# Patient Record
Sex: FEMALE | Race: BLACK OR AFRICAN AMERICAN | NOT HISPANIC OR LATINO | URBAN - METROPOLITAN AREA
[De-identification: names, ages, dates, MRNs, and addresses within clinical notes are randomized per-mention and may not be internally consistent; named-entity substitution may affect disease eponyms.]

---

## 2017-01-25 ENCOUNTER — INPATIENT (INPATIENT)
Facility: HOSPITAL | Age: 62
LOS: 8 days | Discharge: HOME HEALTH | DRG: 194 | End: 2017-02-03
Attending: INTERNAL MEDICINE | Admitting: INTERNAL MEDICINE
Payer: COMMERCIAL

## 2017-01-25 VITALS
RESPIRATION RATE: 18 BRPM | SYSTOLIC BLOOD PRESSURE: 91 MMHG | OXYGEN SATURATION: 92 % | HEART RATE: 115 BPM | TEMPERATURE: 99 F | DIASTOLIC BLOOD PRESSURE: 60 MMHG

## 2017-01-25 DIAGNOSIS — Z86.718 PERSONAL HISTORY OF OTHER VENOUS THROMBOSIS AND EMBOLISM: ICD-10-CM

## 2017-01-25 DIAGNOSIS — R63.8 OTHER SYMPTOMS AND SIGNS CONCERNING FOOD AND FLUID INTAKE: ICD-10-CM

## 2017-01-25 DIAGNOSIS — J45.909 UNSPECIFIED ASTHMA, UNCOMPLICATED: ICD-10-CM

## 2017-01-25 DIAGNOSIS — K56.69 OTHER INTESTINAL OBSTRUCTION: Chronic | ICD-10-CM

## 2017-01-25 DIAGNOSIS — G35 MULTIPLE SCLEROSIS: ICD-10-CM

## 2017-01-25 DIAGNOSIS — K56.69 OTHER INTESTINAL OBSTRUCTION: ICD-10-CM

## 2017-01-25 DIAGNOSIS — D64.9 ANEMIA, UNSPECIFIED: ICD-10-CM

## 2017-01-25 DIAGNOSIS — Z29.9 ENCOUNTER FOR PROPHYLACTIC MEASURES, UNSPECIFIED: ICD-10-CM

## 2017-01-25 DIAGNOSIS — I63.9 CEREBRAL INFARCTION, UNSPECIFIED: ICD-10-CM

## 2017-01-25 DIAGNOSIS — R06.02 SHORTNESS OF BREATH: ICD-10-CM

## 2017-01-25 LAB
ALBUMIN SERPL ELPH-MCNC: 3.3 G/DL — LOW (ref 3.4–5)
ALP SERPL-CCNC: 72 U/L — SIGNIFICANT CHANGE UP (ref 40–120)
ALT FLD-CCNC: 91 U/L — HIGH (ref 12–42)
ANION GAP SERPL CALC-SCNC: 7 MMOL/L — LOW (ref 9–16)
APTT BLD: 32.6 SEC — SIGNIFICANT CHANGE UP (ref 27.5–37.4)
AST SERPL-CCNC: 223 U/L — HIGH (ref 15–37)
BILIRUB SERPL-MCNC: 0.6 MG/DL — SIGNIFICANT CHANGE UP (ref 0.2–1.2)
BUN SERPL-MCNC: 16 MG/DL — SIGNIFICANT CHANGE UP (ref 7–23)
CALCIUM SERPL-MCNC: 7.9 MG/DL — LOW (ref 8.5–10.5)
CHLORIDE SERPL-SCNC: 101 MMOL/L — SIGNIFICANT CHANGE UP (ref 96–108)
CO2 SERPL-SCNC: 28 MMOL/L — SIGNIFICANT CHANGE UP (ref 22–31)
CREAT SERPL-MCNC: 0.76 MG/DL — SIGNIFICANT CHANGE UP (ref 0.5–1.3)
GLUCOSE SERPL-MCNC: 84 MG/DL — SIGNIFICANT CHANGE UP (ref 70–99)
HCT VFR BLD CALC: 33.5 % — LOW (ref 34.5–45)
HGB BLD-MCNC: 10.4 G/DL — LOW (ref 11.5–15.5)
INR BLD: 1.05 — SIGNIFICANT CHANGE UP (ref 0.88–1.16)
LACTATE SERPL-SCNC: 1.4 MMOL/L — SIGNIFICANT CHANGE UP (ref 0.5–2)
MAGNESIUM SERPL-MCNC: 2.7 MG/DL — HIGH (ref 1.6–2.4)
MCHC RBC-ENTMCNC: 26.7 PG — LOW (ref 27–34)
MCHC RBC-ENTMCNC: 31 G/DL — LOW (ref 32–36)
MCV RBC AUTO: 85.9 FL — SIGNIFICANT CHANGE UP (ref 80–100)
NT-PROBNP SERPL-SCNC: 45 PG/ML — SIGNIFICANT CHANGE UP
PHOSPHATE SERPL-MCNC: 3.8 MG/DL — SIGNIFICANT CHANGE UP (ref 2.5–4.5)
PLATELET # BLD AUTO: 236 K/UL — SIGNIFICANT CHANGE UP (ref 150–400)
POTASSIUM SERPL-MCNC: 4.4 MMOL/L — SIGNIFICANT CHANGE UP (ref 3.5–5.3)
POTASSIUM SERPL-SCNC: 4.4 MMOL/L — SIGNIFICANT CHANGE UP (ref 3.5–5.3)
PROT SERPL-MCNC: 7.1 G/DL — SIGNIFICANT CHANGE UP (ref 6.4–8.2)
PROTHROM AB SERPL-ACNC: 11.7 SEC — SIGNIFICANT CHANGE UP (ref 10–13.1)
RBC # BLD: 3.9 M/UL — SIGNIFICANT CHANGE UP (ref 3.8–5.2)
RBC # FLD: 18.3 % — HIGH (ref 10.3–16.9)
SODIUM SERPL-SCNC: 136 MMOL/L — SIGNIFICANT CHANGE UP (ref 135–145)
TROPONIN I SERPL-MCNC: <0.015 NG/ML — SIGNIFICANT CHANGE UP (ref 0.01–0.04)
WBC # BLD: 2.2 K/UL — LOW (ref 3.8–10.5)
WBC # FLD AUTO: 2.2 K/UL — LOW (ref 3.8–10.5)

## 2017-01-25 PROCEDURE — 71010: CPT | Mod: 26

## 2017-01-25 PROCEDURE — 93010 ELECTROCARDIOGRAM REPORT: CPT

## 2017-01-25 PROCEDURE — 77002 NEEDLE LOCALIZATION BY XRAY: CPT | Mod: 26

## 2017-01-25 PROCEDURE — 71275 CT ANGIOGRAPHY CHEST: CPT | Mod: 26

## 2017-01-25 PROCEDURE — 99285 EMERGENCY DEPT VISIT HI MDM: CPT | Mod: 25

## 2017-01-25 PROCEDURE — 38221 DX BONE MARROW BIOPSIES: CPT

## 2017-01-25 RX ORDER — MORPHINE SULFATE 50 MG/1
2 CAPSULE, EXTENDED RELEASE ORAL ONCE
Qty: 0 | Refills: 0 | Status: DISCONTINUED | OUTPATIENT
Start: 2017-01-25 | End: 2017-01-25

## 2017-01-25 RX ORDER — IPRATROPIUM/ALBUTEROL SULFATE 18-103MCG
3 AEROSOL WITH ADAPTER (GRAM) INHALATION EVERY 4 HOURS
Qty: 0 | Refills: 0 | Status: DISCONTINUED | OUTPATIENT
Start: 2017-01-25 | End: 2017-02-03

## 2017-01-25 RX ORDER — PIPERACILLIN AND TAZOBACTAM 4; .5 G/20ML; G/20ML
INJECTION, POWDER, LYOPHILIZED, FOR SOLUTION INTRAVENOUS
Qty: 0 | Refills: 0 | Status: DISCONTINUED | OUTPATIENT
Start: 2017-01-26 | End: 2017-02-01

## 2017-01-25 RX ORDER — PIPERACILLIN AND TAZOBACTAM 4; .5 G/20ML; G/20ML
4.5 INJECTION, POWDER, LYOPHILIZED, FOR SOLUTION INTRAVENOUS ONCE
Qty: 0 | Refills: 0 | Status: COMPLETED | OUTPATIENT
Start: 2017-01-25 | End: 2017-01-26

## 2017-01-25 RX ORDER — PIPERACILLIN AND TAZOBACTAM 4; .5 G/20ML; G/20ML
4.5 INJECTION, POWDER, LYOPHILIZED, FOR SOLUTION INTRAVENOUS EVERY 6 HOURS
Qty: 0 | Refills: 0 | Status: DISCONTINUED | OUTPATIENT
Start: 2017-01-26 | End: 2017-02-01

## 2017-01-25 RX ORDER — SODIUM CHLORIDE 9 MG/ML
1000 INJECTION INTRAMUSCULAR; INTRAVENOUS; SUBCUTANEOUS ONCE
Qty: 0 | Refills: 0 | Status: COMPLETED | OUTPATIENT
Start: 2017-01-25 | End: 2017-01-25

## 2017-01-25 RX ORDER — INSULIN LISPRO 100/ML
VIAL (ML) SUBCUTANEOUS
Qty: 0 | Refills: 0 | Status: DISCONTINUED | OUTPATIENT
Start: 2017-01-25 | End: 2017-02-03

## 2017-01-25 RX ORDER — IPRATROPIUM/ALBUTEROL SULFATE 18-103MCG
3 AEROSOL WITH ADAPTER (GRAM) INHALATION ONCE
Qty: 0 | Refills: 0 | Status: COMPLETED | OUTPATIENT
Start: 2017-01-25 | End: 2017-01-25

## 2017-01-25 RX ORDER — SODIUM CHLORIDE 9 MG/ML
1000 INJECTION INTRAMUSCULAR; INTRAVENOUS; SUBCUTANEOUS
Qty: 0 | Refills: 0 | Status: DISCONTINUED | OUTPATIENT
Start: 2017-01-25 | End: 2017-01-26

## 2017-01-25 RX ORDER — HYDROMORPHONE HYDROCHLORIDE 2 MG/ML
1 INJECTION INTRAMUSCULAR; INTRAVENOUS; SUBCUTANEOUS ONCE
Qty: 0 | Refills: 0 | Status: DISCONTINUED | OUTPATIENT
Start: 2017-01-25 | End: 2017-01-25

## 2017-01-25 RX ORDER — VANCOMYCIN HCL 1 G
1000 VIAL (EA) INTRAVENOUS EVERY 12 HOURS
Qty: 0 | Refills: 0 | Status: DISCONTINUED | OUTPATIENT
Start: 2017-01-25 | End: 2017-01-27

## 2017-01-25 RX ORDER — FENTANYL CITRATE 50 UG/ML
50 INJECTION INTRAVENOUS ONCE
Qty: 0 | Refills: 0 | Status: DISCONTINUED | OUTPATIENT
Start: 2017-01-25 | End: 2017-01-25

## 2017-01-25 RX ADMIN — Medication 52 MILLIGRAM(S): at 23:02

## 2017-01-25 RX ADMIN — SODIUM CHLORIDE 1000 MILLILITER(S): 9 INJECTION INTRAMUSCULAR; INTRAVENOUS; SUBCUTANEOUS at 19:39

## 2017-01-25 RX ADMIN — SODIUM CHLORIDE 2000 MILLILITER(S): 9 INJECTION INTRAMUSCULAR; INTRAVENOUS; SUBCUTANEOUS at 23:02

## 2017-01-25 RX ADMIN — Medication 3 MILLILITER(S): at 23:02

## 2017-01-25 RX ADMIN — MORPHINE SULFATE 2 MILLIGRAM(S): 50 CAPSULE, EXTENDED RELEASE ORAL at 23:06

## 2017-01-25 NOTE — ED PROVIDER NOTE - MUSCULOSKELETAL, MLM
Spine appears normal, range of motion is not limited, no muscle or joint tenderness. no calf ttp/swelling.

## 2017-01-25 NOTE — ED PROVIDER NOTE - OBJECTIVE STATEMENT
61y female h/o anemia, asthma, DVT/PE (noncompliant with xarelto x1m), SBOs, MS, CVA sent from Dr Garay's office for evaluation. Pt c/o stiffness to her b/l LEs for 4-5 days, trouble walking on her own, and generalized weakness. Also notes intermittent chest pain a/w shortness of breath for several days. (+)dry cough. No fever, V/D, abd pain. Noncompliant with multiple meds including xarelto.

## 2017-01-25 NOTE — H&P ADULT. - GASTROINTESTINAL DETAILS
no rebound tenderness/bowel sounds normal/mildly diffusely tender, midline scar well healed/no rigidity/no guarding/soft

## 2017-01-25 NOTE — ED PROVIDER NOTE - CARE PLAN
Principal Discharge DX:	Shortness of breath  Secondary Diagnosis:	Uncomplicated asthma, unspecified asthma severity

## 2017-01-25 NOTE — H&P ADULT. - PMH
Anemia    Asthma    CVA (cerebral vascular accident)    History of DVT (deep vein thrombosis)    MS (multiple sclerosis)    Small bowel obstruction  multiple, recurrent

## 2017-01-25 NOTE — H&P ADULT. - ASSESSMENT
Pt is a 61 year old female with PMH of anemia, asthma, DVT with noncompliance with AC, MS, multiple SBO's s/p multiple abdominal surgeries and recent CVA admitted to [facility] about 4 weeks ago for 4-6weeks who presented to the ED today after being sent in from Dr. Garay's office for tachycardia, low blood pressure and complaint of cough and sob.

## 2017-01-25 NOTE — H&P ADULT. - PROBLEM SELECTOR PLAN 4
pt currently not wheezing on exam and hadn't received steroids or nebulizers prior.  -however ED ordered patient for solumedrol 125mg, pt does not appear to be in asthma exacerbation, will start duonebs [id number], but no need to continue steroids at this time.  -pulm to evaluate patient in the AM.

## 2017-01-25 NOTE — ED PROVIDER NOTE - CONSTITUTIONAL, MLM
normal... cachectic appearing, awake, alert, oriented to person, place, time/situation and in no apparent distress.

## 2017-01-25 NOTE — H&P ADULT. - PROBLEM SELECTOR PLAN 1
associated with cough, found to have tachycardic and low BP in ED, likely 2/2 PNA vs influenza, less likely PE.  -pt with CT angio showing no PE but mucous plugging, left sided infiltrate with possible parapneumonic effusion present. In setting of low BP, tachycardia and low grade temp of 99, will treat patient with vancomycin and zosyn for likely HCAP.  -check RVP to r/o influenza.  -holding off on steroids at this time as no wheezing on exam, however will start duonebs [id number].   -pt without work of breathing currently, will continue 2L NC for supplemental O2 for now.  -pulmonary to see patient in AM.

## 2017-01-25 NOTE — H&P ADULT. - PROBLEM SELECTOR PLAN 3
per patient she had a brain bleed 4 weeks ago and was admitted at [facility] for 4-6 weeks. will need to obtain collateral information in AM.  -will check CT head for possible bleed. no HSQ or AC until confirmed no brain bleed present.  -lower extremity weakness and numbness is stable since admission for stroke per patient.  -neurology to see patient in the AM. per patient she had a brain bleed 4 weeks ago and was admitted at [facility] for 4-6 weeks. will need to obtain collateral information in AM.  -will check CT head for possible bleed. no HSQ or AC until confirmed no brain bleed present.  -lower extremity weakness and numbness is stable since admission for stroke per patient.  -neurology to see patient in the AM.  -continue home keppra 1000mg bid for seizure prophylaxis.

## 2017-01-25 NOTE — H&P ADULT. - MOTOR
5/5 upper extremities b/l, lower extremities right LE with 4/5 hip flexion, dorsiflexion, plantarflexion, 5/5 upper extremities b/l, lower extremities right LE with 4/5 hip flexion, dorsiflexion, plantarflexion, left lower extremity with 3-4/5 hip flexors, 4/5 plantar flexion and 3/5 dorsiflexion.

## 2017-01-25 NOTE — H&P ADULT. - PROBLEM SELECTOR PLAN 6
h/o multiple SBOs in the past and now with mild diffuse abdominal pain, no rigidity, no rebound.  -pt suffers from chronic abdominal pain and on methadone per Dr. Garay however unable to confirm dosage as no med list and [facility] is closed and unable to contact sister. will clarify in AM  - no BM for one week, will check abdominal Xray and keep NPO for now.  -if no SBO will give patient aggressive bowel regimen h/o multiple SBOs in the past and now with mild diffuse abdominal pain, no rigidity, no rebound.  -pt suffers from chronic abdominal pain and on methadone per Dr. Garay however unable to confirm dosage as no med list and [facility] is closed and unable to contact sister. will clarify in AM  - no BM for one week, will check abdominal Xray and keep NPO for now.  -will continue home methadone 10mg [id number] if no obstruction noted.  -continue home protonix h/o multiple SBOs in the past and now with mild diffuse abdominal pain, no rigidity, no rebound.  -pt suffers from chronic abdominal pain and on methadone per Dr. Garay  - no BM for one week, will check abdominal Xray and keep NPO for now.  -will continue home methadone 10mg [id number] if no obstruction noted.  -continue home protonix

## 2017-01-25 NOTE — ED CLERICAL - NS ED CLERK NOTE PRE-ARRIVAL INFORMATION; ADDITIONAL PRE-ARRIVAL INFORMATION
63/F/ [facility] PT CALLED 1 MONTH AGO ASKING FOR SYRINGES + AMBIAN+ SEVERAL OTHERS MED NEVER FOLLOWED UO AFTER RECENT HOSPITALIZATION SOB AFTER NON COMPLIANCE & DVT TREATMENT SENT BY DR. SHANNON

## 2017-01-25 NOTE — H&P ADULT. - PROBLEM SELECTOR PLAN 7
normocytic anemia, possibly AOCD or iron deficiency, unclear baseline, however does not appear to be acutely bleeding, no GI symptoms, less likely hemolyzing due to normal T. bili.  - will check iron panel, monitor cbc

## 2017-01-25 NOTE — ED ADULT NURSE REASSESSMENT NOTE - NS ED NURSE REASSESS COMMENT FT1
Pt noted to be hypotensive, requesting pain medication for abdominal pain. Dr Perez aware, IV fluid given per order. When return to pt to attempt administration of pain medication, pt sleeping comfortably. Per Dr Perez, hold pain medication at this time.

## 2017-01-25 NOTE — H&P ADULT. - PROBLEM SELECTOR PLAN 5
stable, pt does not appear to be in MS flare. will clarify medications in AM, if patient takes any.  -per Dr. Garay neuro Dr. mendel to see the patient in the AM.

## 2017-01-25 NOTE — ED ADULT NURSE NOTE - OBJECTIVE STATEMENT
Pt presents to ED A&OX3 c/o SOB, right hip pain and b/l leg weakness and numbness x 2 weeks. Pt reports multiple falls over the past two days, states she hit her head a few times. Pt with hx of MS and stroke "a few months ago". Pt denies CP, fevers, n/v/d. Pt upgraded to Dr Perez. Pt placed on telemetry monitor, O2 2L NC applied to maintain O2 sat >92%. Pt speaking in complete sentences, lungs clear.

## 2017-01-25 NOTE — ED PROVIDER NOTE - PROGRESS NOTE DETAILS
CTA noted; no PE, but (+)LLL mucus plugging and consolidation with small effusion, (+)bronchial inflammation.

## 2017-01-25 NOTE — H&P ADULT. - HISTORY OF PRESENT ILLNESS
Pt is a 61 year old female with PMH of anemia, asthma, DVT with noncompliance with AC, MS, multiple SBO's s/p multiple abdominal surgeries and recent CVA admitted to [facility] about 4 weeks ago for 4-6weeks who presented to the ED today after being sent in from Dr. Garay's office for tachycardia, low blood pressure and complaint of cough and sob.  Pt stated she has been having a cough with yellow phlegm since Last friday along with f/c/s and congestion and myalgias.  Pt stated her sister also was sick last week with similar symptoms. She denied wheezing at home or increased use of her inhalers.  Pt also stated she was admitted at [facility] about 4 weeks ago for a stroke that she had and was told she had a brain bleed but unclear if actual brain bleed as patient stated she was also taking lovenox at home.  Since her stroke about 4 weeks ago the patient has admitted to lower extremity weakness and numbness in her toes up to mid shin with difficulty walking and getting out of bed.  She also stated she has had multiple SBO's about 6 of them and that she hasn't had a BM in about one week and has had a diffuse cramping abdominal pain for the last few days with an episode of nausea last week, but no vomiting.  She admitted to decreased PO intake at home.  In the ED patient had a CT angio to r/o PE and vital signs were Tmax: 99.0, HR: , BP 98/61, RR 18 and sO2 of 92% room air and 98% on 2L NC. Pt is a 61 year old female with PMH of anemia, asthma, DVT with noncompliance with AC, MS, multiple SBO's s/p multiple abdominal surgeries and recent CVA admitted to [facility] about 4 weeks ago for 4-6weeks who presented to the ED today after being sent in from Dr. Garay's office for tachycardia, low blood pressure and complaint of cough and sob.  Pt stated she has been having a cough with yellow phlegm since Last friday along with f/c/s and congestion and myalgias.  Pt stated her sister also was sick last week with similar symptoms. She denied wheezing at home or increased use of her inhalers.  Pt also stated she was admitted at [facility] about 4 weeks ago for a stroke that she had and was told she had a brain bleed but unclear if actual brain bleed as patient stated she was also taking lovenox at home.  Since her stroke about 4 weeks ago the patient has admitted to having continued lower extremity weakness and numbness in her toes up to mid shin with difficulty walking and getting out of bed.  Pt denied back pain.  She also stated she has had multiple SBO's about 6 of them and that she hasn't had a BM in about one week and has had a diffuse cramping abdominal pain for the last few days with an episode of nausea last week, but no vomiting.  She admitted to decreased PO intake at home.  In the ED patient had a CT angio to r/o PE and vital signs were Tmax: 99.0, HR: , BP 98/61, RR 18 and sO2 of 92% room air and 98% on 2L NC.

## 2017-01-25 NOTE — PROGRESS NOTE ADULT - SUBJECTIVE AND OBJECTIVE BOX
INTERVAL HISTORY:61yFemalePatient is a 61y old  Female who presents with a chief complaint of   	  MEDICATIONS:        fentaNYL    Injectable 50MICROGram(s) IV Push Once            Complaint:     Otherwise 12 point review of systems is normal.    PHYSICAL EXAM:    Constitutional: good  Eyes: PERRL, EOMI, sclera non-icteric  Neck: supple, no masses, no JVD  Respiratory: CTA b/l, good air entry b/l, no wheezing, rhonchi, rales, with normal respiratory effort and no intercostal retractions  Cardiovascular: RRR, normal S1S2, no M/R/G  Gastrointestinal: soft, NTND, no masses palpable, BS normal in all four quadrants,   Extremities:  no c/c/e  Neurological: AAOx3  Temp:Afebrile  ICU Vital Signs Last 24 Hrs  T(C): 37.2, Max: 37.2 (01-25 @ 14:58)  T(F): 99, Max: 99 (01-25 @ 14:58)  HR: 92 (92 - 115)  BP: 98/61 (91/60 - 98/61)  BP(mean): --  ABP: --  ABP(mean): --  RR: 17 (17 - 18)  SpO2: 99% (92% - 99%)        ECG:      CHEST X RAY    CT    MRI    MRA    CT ANGIO    CAROTID DUPLEX    DUPLEX     Echocardiogram    Catheterization:    Stress Test:     LABS:	 	  CARDIAC MARKERS:  Troponin I, Serum: <0.015 ng/mL [0.015 - 0.045] (01-25 @ 16:23)                              10.4   2.2   )-----------( 236      ( 25 Jan 2017 16:23 )             33.5     25 Jan 2017 16:23    136    |  101    |  16     ----------------------------<  84     4.4     |  28     |  0.76     Ca    7.9        25 Jan 2017 16:23    TPro  7.1    /  Alb  3.3    /  TBili  0.6    /  DBili  x      /  AST  223    /  ALT  91     /  AlkPhos  72     25 Jan 2017 16:23    proBNP: Serum Pro-Brain Natriuretic Peptide: 45 pg/mL (01-25 @ 16:23)    Lipid Profile:   HgA1c:   TSH:           ASSESSMENT/PLAN:

## 2017-01-25 NOTE — H&P ADULT. - PROBLEM SELECTOR PLAN 9
NPO for now pending abdominal Xray, pt unable to give a list of her medications currently as she cant remember them so I attempted to call her sister Ole @ [phone] however no answer, pt will need med rec in AM. NPO for now pending abdominal Xray,  pt will need med rec in AM.

## 2017-01-25 NOTE — ED ADULT NURSE NOTE - PMH
Anemia    Asthma    G tube feedings    History of DVT (deep vein thrombosis)    MS (multiple sclerosis)    Small bowel obstruction  multiple, recurrent

## 2017-01-25 NOTE — H&P ADULT. - PROBLEM SELECTOR PLAN 2
per patient h/o DVT in her left leg and previously on xarelto and lovenox.  -per Dr. Garay concern that patient may have been taking both xarelto and lovenox at home, will hold AC for now and check CT head prior to restarting AC as patient stated she had a brain bleed 4 weeks ago. per patient h/o DVT in her left leg and previously on xarelto and lovenox.  -per Dr. Garay concern that patient may have been taking both xarelto and lovenox at home, will hold AC for now and check CT head prior to restarting AC as patient stated she had a brain bleed 4 weeks ago.  -coags currently normal, will monitor patient for signs of bleeding.

## 2017-01-26 LAB
ALBUMIN SERPL ELPH-MCNC: 2.6 G/DL — LOW (ref 3.4–5)
ALP SERPL-CCNC: 56 U/L — SIGNIFICANT CHANGE UP (ref 40–120)
ALT FLD-CCNC: 66 U/L — HIGH (ref 12–42)
ANION GAP SERPL CALC-SCNC: 7 MMOL/L — LOW (ref 9–16)
AST SERPL-CCNC: 115 U/L — HIGH (ref 15–37)
BILIRUB SERPL-MCNC: 0.2 MG/DL — SIGNIFICANT CHANGE UP (ref 0.2–1.2)
BUN SERPL-MCNC: 10 MG/DL — SIGNIFICANT CHANGE UP (ref 7–23)
CALCIUM SERPL-MCNC: 7.4 MG/DL — LOW (ref 8.5–10.5)
CHLORIDE SERPL-SCNC: 111 MMOL/L — HIGH (ref 96–108)
CO2 SERPL-SCNC: 25 MMOL/L — SIGNIFICANT CHANGE UP (ref 22–31)
CREAT SERPL-MCNC: 0.56 MG/DL — SIGNIFICANT CHANGE UP (ref 0.5–1.3)
FERRITIN SERPL-MCNC: 39.4 NG/ML — SIGNIFICANT CHANGE UP (ref 8–252)
FLUAV H1 2009 PAND RNA SPEC QL NAA+PROBE: DETECTED
GLUCOSE SERPL-MCNC: 38 MG/DL — CRITICAL LOW (ref 70–99)
HBA1C BLD-MCNC: 4.9 % — SIGNIFICANT CHANGE UP (ref 4.8–5.6)
HCT VFR BLD CALC: 28.9 % — LOW (ref 34.5–45)
HCV AB S/CO SERPL IA: 0.1 S/CO — SIGNIFICANT CHANGE UP
HCV AB SERPL-IMP: SIGNIFICANT CHANGE UP
HGB BLD-MCNC: 8.7 G/DL — LOW (ref 11.5–15.5)
IRON SATN MFR SERPL: 16 UG/DL — LOW (ref 50–170)
IRON SATN MFR SERPL: 6 % — LOW (ref 20–38)
MAGNESIUM SERPL-MCNC: 2.6 MG/DL — HIGH (ref 1.6–2.4)
MCHC RBC-ENTMCNC: 26.7 PG — LOW (ref 27–34)
MCHC RBC-ENTMCNC: 30.1 G/DL — LOW (ref 32–36)
MCV RBC AUTO: 88.7 FL — SIGNIFICANT CHANGE UP (ref 80–100)
PLATELET # BLD AUTO: 205 K/UL — SIGNIFICANT CHANGE UP (ref 150–400)
POTASSIUM SERPL-MCNC: 4.5 MMOL/L — SIGNIFICANT CHANGE UP (ref 3.5–5.3)
POTASSIUM SERPL-SCNC: 4.5 MMOL/L — SIGNIFICANT CHANGE UP (ref 3.5–5.3)
PROT SERPL-MCNC: 5.8 G/DL — LOW (ref 6.4–8.2)
RAPID RVP RESULT: DETECTED
RBC # BLD: 3.26 M/UL — LOW (ref 3.8–5.2)
RBC # FLD: 18.7 % — HIGH (ref 10.3–16.9)
SODIUM SERPL-SCNC: 143 MMOL/L — SIGNIFICANT CHANGE UP (ref 135–145)
TIBC SERPL-MCNC: 260 UG/DL — SIGNIFICANT CHANGE UP (ref 250–450)
TRANSFERRIN SERPL-MCNC: 185 MG/DL — LOW (ref 200–360)
WBC # BLD: 2.3 K/UL — LOW (ref 3.8–10.5)
WBC # FLD AUTO: 2.3 K/UL — LOW (ref 3.8–10.5)

## 2017-01-26 PROCEDURE — 93970 EXTREMITY STUDY: CPT | Mod: 26

## 2017-01-26 PROCEDURE — 76705 ECHO EXAM OF ABDOMEN: CPT | Mod: 26

## 2017-01-26 PROCEDURE — 99223 1ST HOSP IP/OBS HIGH 75: CPT

## 2017-01-26 PROCEDURE — 70450 CT HEAD/BRAIN W/O DYE: CPT | Mod: 26

## 2017-01-26 PROCEDURE — 74020: CPT | Mod: 26

## 2017-01-26 RX ORDER — ZOLPIDEM TARTRATE 10 MG/1
5 TABLET ORAL ONCE
Qty: 0 | Refills: 0 | Status: DISCONTINUED | OUTPATIENT
Start: 2017-01-26 | End: 2017-01-26

## 2017-01-26 RX ORDER — MORPHINE SULFATE 50 MG/1
2 CAPSULE, EXTENDED RELEASE ORAL ONCE
Qty: 0 | Refills: 0 | Status: DISCONTINUED | OUTPATIENT
Start: 2017-01-26 | End: 2017-01-26

## 2017-01-26 RX ORDER — METHADONE HYDROCHLORIDE 40 MG/1
10 TABLET ORAL EVERY 6 HOURS
Qty: 0 | Refills: 0 | Status: DISCONTINUED | OUTPATIENT
Start: 2017-01-26 | End: 2017-01-30

## 2017-01-26 RX ORDER — PANTOPRAZOLE SODIUM 20 MG/1
40 TABLET, DELAYED RELEASE ORAL
Qty: 0 | Refills: 0 | Status: DISCONTINUED | OUTPATIENT
Start: 2017-01-26 | End: 2017-02-03

## 2017-01-26 RX ORDER — LEVETIRACETAM 250 MG/1
1000 TABLET, FILM COATED ORAL
Qty: 0 | Refills: 0 | Status: DISCONTINUED | OUTPATIENT
Start: 2017-01-26 | End: 2017-02-03

## 2017-01-26 RX ORDER — IPRATROPIUM/ALBUTEROL SULFATE 18-103MCG
3 AEROSOL WITH ADAPTER (GRAM) INHALATION ONCE
Qty: 0 | Refills: 0 | Status: COMPLETED | OUTPATIENT
Start: 2017-01-26 | End: 2017-01-26

## 2017-01-26 RX ORDER — CHOLECALCIFEROL (VITAMIN D3) 125 MCG
1000 CAPSULE ORAL DAILY
Qty: 0 | Refills: 0 | Status: DISCONTINUED | OUTPATIENT
Start: 2017-01-26 | End: 2017-02-01

## 2017-01-26 RX ORDER — MORPHINE SULFATE 50 MG/1
2 CAPSULE, EXTENDED RELEASE ORAL EVERY 4 HOURS
Qty: 0 | Refills: 0 | Status: DISCONTINUED | OUTPATIENT
Start: 2017-01-26 | End: 2017-01-26

## 2017-01-26 RX ORDER — POLYETHYLENE GLYCOL 3350 17 G/17G
17 POWDER, FOR SOLUTION ORAL DAILY
Qty: 0 | Refills: 0 | Status: DISCONTINUED | OUTPATIENT
Start: 2017-01-26 | End: 2017-01-27

## 2017-01-26 RX ORDER — FERROUS SULFATE 325(65) MG
325 TABLET ORAL DAILY
Qty: 0 | Refills: 0 | Status: DISCONTINUED | OUTPATIENT
Start: 2017-01-26 | End: 2017-02-03

## 2017-01-26 RX ORDER — HEPARIN SODIUM 5000 [USP'U]/ML
5000 INJECTION INTRAVENOUS; SUBCUTANEOUS EVERY 8 HOURS
Qty: 0 | Refills: 0 | Status: DISCONTINUED | OUTPATIENT
Start: 2017-01-26 | End: 2017-01-29

## 2017-01-26 RX ORDER — BUDESONIDE, MICRONIZED 100 %
1 POWDER (GRAM) MISCELLANEOUS DAILY
Qty: 0 | Refills: 0 | Status: DISCONTINUED | OUTPATIENT
Start: 2017-01-26 | End: 2017-02-03

## 2017-01-26 RX ORDER — ACETAMINOPHEN 500 MG
650 TABLET ORAL EVERY 6 HOURS
Qty: 0 | Refills: 0 | Status: DISCONTINUED | OUTPATIENT
Start: 2017-01-26 | End: 2017-01-26

## 2017-01-26 RX ADMIN — PIPERACILLIN AND TAZOBACTAM 200 GRAM(S): 4; .5 INJECTION, POWDER, LYOPHILIZED, FOR SOLUTION INTRAVENOUS at 05:57

## 2017-01-26 RX ADMIN — Medication 3 MILLILITER(S): at 05:57

## 2017-01-26 RX ADMIN — HEPARIN SODIUM 5000 UNIT(S): 5000 INJECTION INTRAVENOUS; SUBCUTANEOUS at 21:28

## 2017-01-26 RX ADMIN — METHADONE HYDROCHLORIDE 10 MILLIGRAM(S): 40 TABLET ORAL at 08:38

## 2017-01-26 RX ADMIN — Medication 650 MILLIGRAM(S): at 04:36

## 2017-01-26 RX ADMIN — Medication 3 MILLILITER(S): at 00:59

## 2017-01-26 RX ADMIN — MORPHINE SULFATE 2 MILLIGRAM(S): 50 CAPSULE, EXTENDED RELEASE ORAL at 01:19

## 2017-01-26 RX ADMIN — PIPERACILLIN AND TAZOBACTAM 200 GRAM(S): 4; .5 INJECTION, POWDER, LYOPHILIZED, FOR SOLUTION INTRAVENOUS at 23:38

## 2017-01-26 RX ADMIN — ZOLPIDEM TARTRATE 5 MILLIGRAM(S): 10 TABLET ORAL at 23:19

## 2017-01-26 RX ADMIN — METHADONE HYDROCHLORIDE 10 MILLIGRAM(S): 40 TABLET ORAL at 21:35

## 2017-01-26 RX ADMIN — LEVETIRACETAM 1000 MILLIGRAM(S): 250 TABLET, FILM COATED ORAL at 05:57

## 2017-01-26 RX ADMIN — Medication 75 MILLIGRAM(S): at 17:09

## 2017-01-26 RX ADMIN — Medication 325 MILLIGRAM(S): at 17:09

## 2017-01-26 RX ADMIN — Medication 1000 UNIT(S): at 12:40

## 2017-01-26 RX ADMIN — Medication 3 MILLILITER(S): at 03:48

## 2017-01-26 RX ADMIN — Medication 250 MILLIGRAM(S): at 14:05

## 2017-01-26 RX ADMIN — Medication 3 MILLILITER(S): at 09:14

## 2017-01-26 RX ADMIN — Medication 650 MILLIGRAM(S): at 03:28

## 2017-01-26 RX ADMIN — Medication 3 MILLILITER(S): at 17:08

## 2017-01-26 RX ADMIN — Medication 3 MILLILITER(S): at 21:26

## 2017-01-26 RX ADMIN — Medication 3 MILLILITER(S): at 12:07

## 2017-01-26 RX ADMIN — MORPHINE SULFATE 2 MILLIGRAM(S): 50 CAPSULE, EXTENDED RELEASE ORAL at 02:18

## 2017-01-26 RX ADMIN — PIPERACILLIN AND TAZOBACTAM 200 GRAM(S): 4; .5 INJECTION, POWDER, LYOPHILIZED, FOR SOLUTION INTRAVENOUS at 00:50

## 2017-01-26 RX ADMIN — PIPERACILLIN AND TAZOBACTAM 200 GRAM(S): 4; .5 INJECTION, POWDER, LYOPHILIZED, FOR SOLUTION INTRAVENOUS at 13:20

## 2017-01-26 RX ADMIN — SODIUM CHLORIDE 100 MILLILITER(S): 9 INJECTION INTRAMUSCULAR; INTRAVENOUS; SUBCUTANEOUS at 17:15

## 2017-01-26 RX ADMIN — PIPERACILLIN AND TAZOBACTAM 200 GRAM(S): 4; .5 INJECTION, POWDER, LYOPHILIZED, FOR SOLUTION INTRAVENOUS at 19:54

## 2017-01-26 RX ADMIN — POLYETHYLENE GLYCOL 3350 17 GRAM(S): 17 POWDER, FOR SOLUTION ORAL at 12:40

## 2017-01-26 RX ADMIN — Medication 40 MILLIGRAM(S): at 12:05

## 2017-01-26 RX ADMIN — LEVETIRACETAM 1000 MILLIGRAM(S): 250 TABLET, FILM COATED ORAL at 17:09

## 2017-01-26 RX ADMIN — Medication 250 MILLIGRAM(S): at 02:12

## 2017-01-26 RX ADMIN — METHADONE HYDROCHLORIDE 10 MILLIGRAM(S): 40 TABLET ORAL at 14:49

## 2017-01-26 RX ADMIN — SODIUM CHLORIDE 100 MILLILITER(S): 9 INJECTION INTRAMUSCULAR; INTRAVENOUS; SUBCUTANEOUS at 00:08

## 2017-01-26 RX ADMIN — Medication 1: at 09:00

## 2017-01-26 NOTE — CONSULT NOTE ADULT - SUBJECTIVE AND OBJECTIVE BOX
Patient is a 61y old  Female who presents with a chief complaint of shortness of breath (26 Jan 2017 02:21)        HPI:  Pt is a 61 year old female with PMH of anemia, asthma, DVT with noncompliance with AC, MS, multiple SBO's s/p multiple abdominal surgeries and recent CVA admitted to [facility] about 4 weeks ago for 4-6weeks who presented to the ED today after being sent in from Dr. Garay's office for tachycardia, low blood pressure and complaint of cough and sob.  Pt stated she has been having a cough with yellow phlegm since Last friday along with f/c/s and congestion and myalgias.  Pt stated her sister also was sick last week with similar symptoms. She denied wheezing at home or increased use of her inhalers.  Pt also stated she was admitted at [facility] about 4 weeks ago for a stroke that she had and was told she had a brain bleed but unclear if actual brain bleed as patient stated she was also taking lovenox at home.  Since her stroke about 4 weeks ago the patient has admitted to having continued lower extremity weakness and numbness in her toes up to mid shin with difficulty walking and getting out of bed.  Pt denied back pain.  She also stated she has had multiple SBO's about 6 of them and that she hasn't had a BM in about one week and has had a diffuse cramping abdominal pain for the last few days with an episode of nausea last week, but no vomiting.  She admitted to decreased PO intake at home.  In the ED patient had a CT angio to r/o PE and vital signs were Tmax: 99.0, HR: , BP 98/61, RR 18 and sO2 of 92% room air and 98% on 2L NC. (25 Jan 2017 22:05)     Reports weakness, SOB- no headaches, dizziness and generalized weakness      Allergies  dust (Unknown)  latex (Unknown)  No Known Drug Allergies  Nuts (Anaphylaxis)  peanuts (Unknown)      Health Issues  SHORTNESS OF BREATH  No h/o HF  No pertinent family history in first degree relatives  Handoff  MEWS Score  CVA (cerebral vascular accident)  Anemia  G tube feedings  History of DVT (deep vein thrombosis)  MS (multiple sclerosis)  Small bowel obstruction  Asthma  Anemia  Shortness of breath  Nutrition, metabolism, and development symptoms  Prophylactic measure  Anemia  Small bowel obstruction  MS (multiple sclerosis)  Uncomplicated asthma, unspecified asthma severity  CVA (cerebral vascular accident)  History of DVT (deep vein thrombosis)  Shortness of breath  SBO (small bowel obstruction)  SHORTNESS OF BREATH  90+  Uncomplicated asthma, unspecified asthma severity        FAMILY HISTORY:  No pertinent family history in first degree relatives      MEDICATIONS  (STANDING):  vancomycin  IVPB 1000milliGRAM(s) IV Intermittent every 12 hours  piperacillin/tazobactam IVPB.  IV Intermittent   sodium chloride 0.9%. 1000milliLiter(s) IV Continuous <Continuous>  piperacillin/tazobactam IVPB. 4.5Gram(s) IV Intermittent every 6 hours  insulin lispro (HumaLOG) corrective regimen sliding scale  SubCutaneous Before meals and at bedtime  ALBUTerol/ipratropium for Nebulization 3milliLiter(s) Nebulizer every 4 hours  cholecalciferol 1000Unit(s) Oral daily  levETIRAcetam 1000milliGRAM(s) Oral two times a day    MEDICATIONS  (PRN):      PAST MEDICAL & SURGICAL HISTORY:  CVA (cerebral vascular accident)  Anemia  G tube feedings  History of DVT (deep vein thrombosis)  MS (multiple sclerosis)  Small bowel obstruction: multiple, recurrent  Asthma  SBO (small bowel obstruction): multiple      Labs                          10.4   2.2   )-----------( 236      ( 25 Jan 2017 16:23 )             33.5     25 Jan 2017 16:23    136    |  101    |  16     ----------------------------<  84     4.4     |  28     |  0.76     Ca    7.9        25 Jan 2017 16:23  Phos  3.8       25 Jan 2017 16:23  Mg     2.7       25 Jan 2017 16:23    TPro  7.1    /  Alb  3.3    /  TBili  0.6    /  DBili  x      /  AST  223    /  ALT  91     /  AlkPhos  72     25 Jan 2017 16:23      Radiology:    Physical Exam    MENTAL STATUS  -Level of Consciousness- awake    Orientation- person, place  Language- aphasia/ dysarthria  Memory- recent and remote-poor      Cranial Nerve 1- 12  Pupils- equal and reactive  Eye movements- full lateral EOM  Facial - no asymmetry   Lower CN-nl    Gait and Station- uses a walker    MOTOR  Upper- able to lift arms against gravity  Lower-no foot drop    Reflexes- decreased    Sensation- no sensory level    Cerebellar-no tremors    vascular -no bruits    Assessment- Hx of CVA- obtain records from [facility]- Obtain new CT head    Plan Rehab- Psych, B12, TSH

## 2017-01-26 NOTE — PROGRESS NOTE ADULT - SUBJECTIVE AND OBJECTIVE BOX
Pt is a 61 year old female with PMH of anemia, asthma, DVT with noncompliance with AC, MS, multiple SBO's s/p multiple abdominal surgeries and recent CVA admitted to [facility] about 4 weeks ago for 4-6weeks who presented to the ED today after being sent in from my office for tachycardia, low blood pressure and complaint of cough and sob.  Pt stated she has been having a cough with yellow phlegm since Last friday along with f/c/s and congestion and myalgias.  Pt stated her sister also was sick last week with similar symptoms. She denied wheezing at home or increased use of her inhalers.  Pt also stated she was admitted at [facility] about 4 weeks ago for a stroke that she had and was told she had a brain bleed but unclear if actual brain bleed as patient stated she was also taking lovenox at home.  Since her stroke about 4 weeks ago the patient has admitted to having continued lower extremity weakness and numbness in her toes up to mid shin with difficulty walking and getting out of bed.  Pt denied back pain.  She also stated she has had multiple SBO's about 6 of them and that she hasn't had a BM in about one week and has had a diffuse cramping abdominal pain for the last few days with an episode of nausea last week, but no vomiting.  She admitted to decreased PO intake at home.  In the ED patient had a CT angio to r/o PE  Complains of right calf pain, diffuse abdominal pain.     ICU Vital Signs Last 24 Hrs  T(C): 36.5, Max: 37.2 (01-25 @ 22:05)  T(F): 97.7, Max: 99 (01-25 @ 22:05)  HR: 70 (70 - 88)  BP: 101/63 (94/67 - 106/65)  BP(mean): 73 (73 - 73)  ABP: --  ABP(mean): --  RR: 16 (16 - 19)  SpO2: 96% (93% - 100%)      PHYSICAL EXAM:  Constitutional: WDWN, moderate distress due to pain  HEENT: NCAT, PEERL, sclera non-icteric, right scleral hemorrhage, no conjunctival pallor, dry MM  Respiratory: CTA b/l, no wheezes, bi-basilar crackles  Cardiovascular: RRR, normal s1/s2, no MRG  Gastrointestinal: soft, ND, +BS, diffuse abdominal tenderness, voluntary guarding, no organomegaly  Extremities: WWP, DP/radial pulses 2+ b/l, no edema  Neurological: AAOx 3, responds to commands, moves all extremities, CN 2-12 intact  Musculoskeletal: 5/5 in UE b/l, 4/5 in LE b/l    MEDICATIONS  (STANDING):  vancomycin  IVPB 1000milliGRAM(s) IV Intermittent every 12 hours  piperacillin/tazobactam IVPB. 4.5Gram(s) IV Intermittent every 6 hours  sodium chloride 0.9%. 1000milliLiter(s) IV Continuous <Continuous>  insulin lispro (HumaLOG) corrective regimen sliding scale  SubCutaneous Before meals and at bedtime  ALBUTerol/ipratropium for Nebulization 3milliLiter(s) Nebulizer every 4 hours  cholecalciferol 1000Unit(s) Oral daily  levETIRAcetam 1000milliGRAM(s) Oral two times a day  LABS:                8.7    2.3   )-----------( 205      ( 26 Jan 2017 12:40 )             28.9     26 Jan 2017 12:40    143    |  111    |  10     ----------------------------<  38     4.5     |  25     |  0.56     Ca    7.4        26 Jan 2017 12:40  Phos  3.8       25 Jan 2017 16:23  Mg     2.6       26 Jan 2017 12:40    TPro  5.8    /  Alb  2.6    /  TBili  0.2    /  DBili  x      /  AST  115    /  ALT  66     /  AlkPhos  56     26 Jan 2017 12:40    PT/INR - ( 25 Jan 2017 16:23 )   PT: 11.7 sec;   INR: 1.05          PTT - ( 25 Jan 2017 16:23 )  PTT:32.6 sec    RADIOLOGY & ADDITIONAL TESTS:

## 2017-01-26 NOTE — CONSULT NOTE ADULT - SUBJECTIVE AND OBJECTIVE BOX
REASON FOR CONSULT:    HISTORY OF PRESENT ILLNESS: History of Present Illness	  Pt is a 61 year old female with PMH of anemia, asthma, DVT with noncompliance with AC, MS, multiple SBO's s/p multiple abdominal surgeries and recent CVA admitted to [facility] about 4 weeks ago for 4-6weeks who presented to the ED today after being sent in from Dr. Garay's office for tachycardia, low blood pressure and complaint of cough and sob.  Pt stated she has been having a cough with yellow phlegm since Last friday along with f/c/s and congestion and myalgias.  Pt stated her sister also was sick last week with similar symptoms. She denied wheezing at home or increased use of her inhalers.  Pt also stated she was admitted at [facility] about 4 weeks ago for a stroke that she had and was told she had a brain bleed but unclear if actual brain bleed as patient stated she was also taking lovenox at home.  Since her stroke about 4 weeks ago the patient has admitted to having continued lower extremity weakness and numbness in her toes up to mid shin with difficulty walking and getting out of bed.  Pt denied back pain.  She also stated she has had multiple SBO's about 6 of them and that she hasn't had a BM in about one week and has had a diffuse cramping abdominal pain for the last few days with an episode of nausea last week, but no vomiting.  She admitted to decreased PO intake at home.  In the ED patient had a CT angio to r/o PE and vital signs were Tmax: 99.0, HR: , BP 98/61, RR 18 and sO2 of 92% room air and 98% on 2L NC.      PAST MEDICAL & SURGICAL HISTORY:  CVA (cerebral vascular accident)  Anemia  G tube feedings  History of DVT (deep vein thrombosis)  MS (multiple sclerosis)  Small bowel obstruction: multiple, recurrent  Asthma  SBO (small bowel obstruction): multiple      [ ] Diabetes   [ ] Hypertension  [ ] Hyperlipidemia  [ ] CAD  [ ] PCI  [ ] CABG    PREVIOUS DIAGNOSTIC TESTING:    [ ] Echocardiogram:  [ ]  Catheterization:  [ ] Stress Test:  	    MEDICATIONS:    vancomycin  IVPB 1000milliGRAM(s) IV Intermittent every 12 hours  piperacillin/tazobactam IVPB.  IV Intermittent   piperacillin/tazobactam IVPB. 4.5Gram(s) IV Intermittent every 6 hours    ALBUTerol/ipratropium for Nebulization 3milliLiter(s) Nebulizer every 4 hours    levETIRAcetam 1000milliGRAM(s) Oral two times a day  methadone 10milliGRAM(s) Oral every 6 hours PRN      insulin lispro (HumaLOG) corrective regimen sliding scale  SubCutaneous Before meals and at bedtime    sodium chloride 0.9%. 1000milliLiter(s) IV Continuous <Continuous>  cholecalciferol 1000Unit(s) Oral daily      FAMILY HISTORY:  No pertinent family history in first degree relatives      SOCIAL HISTORY:    [ ] Non-smoker  [ ] Smoker  [ ] Alcohol    Allergies    dust (Unknown)  latex (Unknown)  No Known Drug Allergies  Nuts (Anaphylaxis)  peanuts (Unknown)    Intolerances    	    REVIEW OF SYSTEMS:    [x] as per HPI  CONSTITUTIONAL: No fever, weight loss, or fatigue  ENT:  No difficulty hearing, tinnitus, vertigo; No sinus or throat pain  RESPIRATORY: No cough, wheezing, chills or hemoptysis; No Shortness of Breath  CARDIOVASCULAR: No chest pain, palpitations, dizziness, or leg swelling  GASTROINTESTINAL: No abdominal or epigastric pain. No nausea, vomiting, or hematemesis; No diarrhea or constipation. No melena or hematochezia.  GENITOURINARY: No dysuria, frequency, hematuria, or incontinence  NEUROLOGICAL: No headaches, memory loss, loss of strength, numbness, or tremors  MUSCULOSKELETAL: No joint pain or swelling; No muscle, back, or extremity pain  [x] All others negative	  [ ] Unable to obtain    PHYSICAL EXAM:  T(C): 36.1, Max: 37.2 (01-25 @ 14:58)  HR: 74 (70 - 115)  BP: 100/60 (91/60 - 106/65)  RR: 19 (17 - 19)  SpO2: 96% (92% - 100%)  Wt(kg): --  I&O's Summary      Appearance: Normal	  HEENT:   Normal oral mucosa, PERRL, EOMI	  Lymphatic: No lymphadenopathy  Cardiovascular: Normal S1 S2, No JVD, No murmurs, No edema  Respiratory: Lungs clear to auscultation	  Psychiatry: A & O x 3, Mood & affect appropriate  Gastrointestinal:  Soft, Non-tender, + BS	  Skin: No rashes, No ecchymoses, No cyanosis	  Neurologic: Non-focal  Extremities: Normal range of motion, No clubbing, cyanosis or edema  Vascular: Peripheral pulses palpable 2+ bilaterally    TELEMETRY: 	    ECG:  Diagnosis Line Sinus tachycardia  Right atrial enlargement  Right axis deviation  ECHO:  STRESS:  CATH:  	  RADIOLOGY:  CXR:  CT:1. No evidence of pulmonary artery emboli. No evidence of right   ventricular strain.  2. Findings compatible with small large airway inflammation with   bronchial wall thickening and scattered tree-in-bud micronodular pattern.   There is a mucous plugging are noted within the left lower lobe.  3. Focal consolidation/atelectasis involving the left lower lobe with a   small possible left parapneumonic effusion.  4. Linear areas of scarring and/or atelectasis in the left lower lung   zone.  5. Multiple low-attenuation intrahepatic structures possibly representing   cysts the largest of which has increased in size when compared to   2/10/2016 measuring up to 3.5 cm. There may be new and hepatic bile duct   dilatation. Further characterization of these findings with a nonemergent   hepatic ultrasound is suggested.  US:   	  	  LABS:	 	    CARDIAC MARKERS:  Troponin I, Serum: <0.015 ng/mL (01-25 @ 16:23)                                  10.4   2.2   )-----------( 236      ( 25 Jan 2017 16:23 )             33.5     25 Jan 2017 16:23    136    |  101    |  16     ----------------------------<  84     4.4     |  28     |  0.76     Ca    7.9        25 Jan 2017 16:23  Phos  3.8       25 Jan 2017 16:23  Mg     2.7       25 Jan 2017 16:23    TPro  7.1    /  Alb  3.3    /  TBili  0.6    /  DBili  x      /  AST  223    /  ALT  91     /  AlkPhos  72     25 Jan 2017 16:23    proBNP: Serum Pro-Brain Natriuretic Peptide: 45 pg/mL (01-25 @ 16:23)    Lipid Profile:   HgA1c:   TSH:     ASSESSMENT/PLAN: 	  HTN - at goal, observe  SOB - mucous plugging no PE, would recommend Echo to eval Rt Heart pressures  Hx DVT - check LE doppers  replete electrolytes

## 2017-01-26 NOTE — PROGRESS NOTE ADULT - SUBJECTIVE AND OBJECTIVE BOX
CC/ HPI  61 year old female with asthma, DVT (noncompliant with AC), recent CVA  requiring hospital stay [facility], admitted with pneumonia, c/o dyspnea, cough and wheezing,  resting comfortable this morning.    PAST MEDICAL & SURGICAL HISTORY:  CVA (cerebral vascular accident)  Anemia  G tube feedings  History of DVT (deep vein thrombosis)  MS (multiple sclerosis)  Small bowel obstruction: multiple, recurrent  Asthma  SBO (small bowel obstruction): multiple    SOCHX:  + tobacco,  -  alcohol    FMHX: FA/MO  - contributory     ROS reviewed below with positive findings marked (+) :  GEN:  fever, chills ENT: tracheostomy,   epistaxis,  sinusitis COR: CAD, CHF,  HTN, dysrhythmia PUL: COPD, ILD, +asthma, pneumonia GI: PEG, dysphagia, hemorrhage, other BRENDA: kidney disease, electrolyte disorder HEM:  +anemia, +thrombus, coagulopathy, cancer ENDO:  thyroid disease, diabetes mellitus CNS:  dementia, +stroke, seizure, PSY:  depression, anxiety, other      MEDICATIONS  (STANDING):  vancomycin  IVPB 1000milliGRAM(s) IV Intermittent every 12 hours  piperacillin/tazobactam IVPB.  IV Intermittent   sodium chloride 0.9%. 1000milliLiter(s) IV Continuous <Continuous>  piperacillin/tazobactam IVPB. 4.5Gram(s) IV Intermittent every 6 hours  insulin lispro (HumaLOG) corrective regimen sliding scale  SubCutaneous Before meals and at bedtime  ALBUTerol/ipratropium for Nebulization 3milliLiter(s) Nebulizer every 4 hours  cholecalciferol 1000Unit(s) Oral daily  levETIRAcetam 1000milliGRAM(s) Oral two times a day    MEDICATIONS  (PRN):  methadone 10milliGRAM(s) Oral every 6 hours PRN pain      Vital Signs Last 24 Hrs  T(C): 36.4, Max: 37.2 (01-25 @ 14:58)  T(F): 97.6, Max: 99 (01-25 @ 14:58)  HR: 70 (70 - 115)  BP: 101/65 (91/60 - 106/65)  BP(mean): 73 (73 - 73)  RR: 19 (17 - 19)  SpO2: 100% (92% - 100%)    GENERAL:         comfortable,  - distress.  HEENT:            - trauma,  - icterus,  - injection,  - nasal discharge.  NECK:              - jugular venous distention, - thyromegaly.  LYMPH:           - lymphadenopathy, - masses.  RESP:              + wheezes,   - rales,   - rhonchi.   COR:                S1S2 RRR  - murmurs,  - gallops,  - rubs.  ABD:                bowel sounds,   soft, - tender, - distended, - organomegaly.  EXT/MSC:         - cyanosis, + clubbing,  - edema.    NEURO:             alert,   responds to stimuli.                        10.4   2.2   )-----------( 236      ( 25 Jan 2017 16:23 )             33.5     CT Chest (1/25)    1. No evidence of pulmonary artery emboli. No evidence of right   ventricular strain.  2. Findings compatible with small large airway inflammation with   bronchial wall thickening and scattered tree-in-bud micronodular pattern.   There is a mucous plugging are noted within the left lower lobe.  3. Focal consolidation/atelectasis involving the left lower lobe with a   small possible left parapneumonic effusion.  4. Linear areas of scarring and/or atelectasis in the left lower lung   zone.      ASSESSMENT/PLAN    1) Pneumonia  2) Chronic obstructive pulmonary disease/asthma  3) Atelectasis/ pleural effusion  4) History of DVT  5) Stroke      Bedside spirometry  Incentive spirometer  Bronchodilators:  Atrovent/ albuterol q 4 – 6 hours as needed  Corticosteroids: Add budesonide, consider prednisone  ID/Antibiotics: Vanco/Zosyn, follow up cultures  Cardiac/HTN: echocardiogram  GI: Rx/ prophylaxis c PPI/H2B  Heme: Rx/VT prophylaxis c SQH/SCD/AC  Discussed with medical team

## 2017-01-26 NOTE — PATIENT PROFILE ADULT. - MEDICATION, CURRENT PHARMACY, PROFILE
[facility]. [address].

## 2017-01-26 NOTE — CONSULT NOTE ADULT - SUBJECTIVE AND OBJECTIVE BOX
Patient is a 61y old  Female who presents with a chief complaint of shortness of breath (26 Jan 2017 02:21)      HPI:  Pt is a 61 year old female with PMH of anemia, asthma, DVT with noncompliance with AC, MS, multiple SBO's s/p multiple abdominal surgeries and recent CVA admitted to [facility] about 4 weeks ago for 4-6weeks who presented to the ED today after being sent in from Dr. Garay's office for tachycardia, low blood pressure and complaint of cough and sob.  Pt stated she has been having a cough with yellow phlegm since Last friday along with f/c/s and congestion and myalgias.  Pt stated her sister also was sick last week with similar symptoms. She denied wheezing at home or increased use of her inhalers.  Pt also stated she was admitted at [facility] about 4 weeks ago for a stroke that she had and was told she had a brain bleed but unclear if actual brain bleed as patient stated she was also taking lovenox at home.  Since her stroke about 4 weeks ago the patient has admitted to having continued lower extremity weakness and numbness in her toes up to mid shin with difficulty walking and getting out of bed.  Pt denied back pain.  She also stated she has had multiple SBO's about 6 of them and that she hasn't had a BM in about one week and has had a diffuse cramping abdominal pain for the last few days with an episode of nausea last week, but no vomiting.  She admitted to decreased PO intake at home.  In the ED patient had a CT angio to r/o PE and vital signs were Tmax: 99.0, HR: , BP 98/61, RR 18 and sO2 of 92% room air and 98% on 2L NC. (25 Jan 2017 22:05)      PAST MEDICAL & SURGICAL HISTORY:  CVA (cerebral vascular accident)  Anemia  G tube feedings  History of DVT (deep vein thrombosis)  MS (multiple sclerosis)  Small bowel obstruction: multiple, recurrent  Asthma  SBO (small bowel obstruction): multiple      MEDICATIONS  (STANDING):  vancomycin  IVPB 1000milliGRAM(s) IV Intermittent every 12 hours  piperacillin/tazobactam IVPB.  IV Intermittent   sodium chloride 0.9%. 1000milliLiter(s) IV Continuous <Continuous>  piperacillin/tazobactam IVPB. 4.5Gram(s) IV Intermittent every 6 hours  insulin lispro (HumaLOG) corrective regimen sliding scale  SubCutaneous Before meals and at bedtime  ALBUTerol/ipratropium for Nebulization 3milliLiter(s) Nebulizer every 4 hours  cholecalciferol 1000Unit(s) Oral daily  levETIRAcetam 1000milliGRAM(s) Oral two times a day  polyethylene glycol 3350 17Gram(s) Oral daily  ferrous    sulfate 325milliGRAM(s) Oral daily  oseltamivir 75milliGRAM(s) Oral two times a day    MEDICATIONS  (PRN):  methadone 10milliGRAM(s) Oral every 6 hours PRN pain      Social History: reports she lives alone in an elevator accessible apartment, + A    Functional Level Prior to Admission: decline in self ADL's post CVA, walked with a walker at home    FAMILY HISTORY:  No pertinent family history in first degree relatives      CBC Full  -  ( 26 Jan 2017 12:40 )  WBC Count : 2.3 K/uL  Hemoglobin : 8.7 g/dL  Hematocrit : 28.9 %  Platelet Count - Automated : 205 K/uL  Mean Cell Volume : 88.7 fL  Mean Cell Hemoglobin : 26.7 pg  Mean Cell Hemoglobin Concentration : 30.1 g/dL  Auto Neutrophil # : x  Auto Lymphocyte # : x  Auto Monocyte # : x  Auto Eosinophil # : x  Auto Basophil # : x  Auto Neutrophil % : x  Auto Lymphocyte % : x  Auto Monocyte % : x  Auto Eosinophil % : x  Auto Basophil % : x      26 Jan 2017 12:40    143    |  111    |  10     ----------------------------<  38     4.5     |  25     |  0.56     Ca    7.4        26 Jan 2017 12:40  Phos  3.8       25 Jan 2017 16:23  Mg     2.6       26 Jan 2017 12:40    TPro  5.8    /  Alb  2.6    /  TBili  0.2    /  DBili  x      /  AST  115    /  ALT  66     /  AlkPhos  56     26 Jan 2017 12:40        Radiology:    EXAM:  XR CHEST 1 VIEW PORT URGENT                           PROCEDURE DATE:  01/25/2017                 INTERPRETATION:  PORTABLE CHEST X-RAY     HISTORY: r/o pna    PRIOR STUDIES: 2/10/2016    FINDINGS: Left retrocardiac peribronchial thickening otherwise the  lungs   are clear.  There are no pleural effusions.  The cardiomediastinal   silhouette, bones and soft tissues are unremarkable.    IMPRESSION:  Left lower lobe peribronchial thickening. No lung   consolidation.    EXAM:  XR ABDOMEN 2 VIEWS PORT URGENT                           PROCEDURE DATE:  01/26/2017                 INTERPRETATION:  RESIDENT PRELIMINARY REPORT     XR ABDOMEN 2 VIEWS PORT URGENT DATED 1/26/2017 1:37 AM    INDICATION: 61 years old Female with r/o obstruction.    PRIOR STUDIES: None    FINDINGS: 2 views of the abdomen were submitted for review. Multiple   staples and other postsurgical material is seen overlying the abdomen,   predominantly in the left upper quadrant. There is a nonobstructive bowel   gas pattern. Intravenous contrast seen within the bladder. Degenerative   changes of the osseous structures are noted with a left hip replacement.   The lower lung zones are clear. There is a small left pleural effusion.    IMPRESSION: Nonobstructive bowel gas pattern.    EXAM:  CT BRAIN                           PROCEDURE DATE:  01/26/2017                 INTERPRETATION:  PROCEDURE: CT brain without intravenous contrast    INDICATION: History of brain bleed    TECHNIQUE: Multiple axial images were obtained at 5 mm intervals from the   skull base to the vertex. The images were reviewed in brain and bone   windows.    COMPARISON: None    FINDINGS: The CT examination demonstrates the patient to be status post   right parietal aylin hole. There are encephalomalacia changes within the   paramedian right parietal lobe with mild ex vacuo dilatation of the   posterior body of the right lateral ventricle. This may be secondary to   previous hemorrhage and/or ischemia. There is no new intracranial   hemorrhage. The ventricles, cisternal spaces, and cortical sulci are   otherwise within normal limits. There is no midline shift or extra axial   collections.  There is minimum patchy hypodensity within the   periventricular white matter which is nonspecific and may represent the   sequela of small vessel ischemic disease in this patient. The bony   windows demonstrates no fractures. The visualized paranasal sinuses are   within normal limits. The mastoid air cells are well aerated.    IMPRESSION: Patient status post right parietal aylin hole with   encephalomalacia changes within the right parietal lobe. No intracranial   hemorrhage or acute transcortical infarct.      Vital Signs Last 24 Hrs  T(C): 36.1, Max: 37.2 (01-25 @ 16:02)  T(F): 97, Max: 99 (01-25 @ 16:02)  HR: 74 (70 - 92)  BP: 100/60 (94/67 - 106/65)  BP(mean): 73 (73 - 73)  RR: 19 (17 - 19)  SpO2: 96% (93% - 100%)    REVIEW OF SYSTEMS:    CONSTITUTIONAL: fatigue  EYES: No eye pain, visual disturbances, or discharge  ENMT:  No difficulty hearing, tinnitus, vertigo; No sinus or throat pain  NECK: No pain or stiffness  BREASTS: No pain, masses, or nipple discharge  RESPIRATORY: cough with yellow sputum shortness of breath  CARDIOVASCULAR: No chest pain, palpitations, dizziness, or leg swelling  GASTROINTESTINAL: No abdominal or epigastric pain. No nausea, vomiting, or hematemesis; No diarrhea or constipation. No melena or hematochezia.  GENITOURINARY: No dysuria, frequency, hematuria, or incontinence  NEUROLOGICAL: No headaches, memory loss, loss of strength, numbness, or tremors  SKIN: No itching, burning, rashes, or lesions   LYMPH NODES: No enlarged glands  ENDOCRINE: No heat or cold intolerance; No hair loss  MUSCULOSKELETAL: muscle/joint pains  PSYCHIATRIC: No depression, anxiety, mood swings, or difficulty sleeping  HEME/LYMPH: No easy bruising, or bleeding gums  ALLERGY AND IMMUNOLOGIC: No hives or eczema      Physical Exam: frail appearing AA woman lying in bed, c/o muscle aches and weakness    HEENT: normocephalic/ atraumatic, anicteric, right parietal bone defect, r scleral hemorhage    Neck: supple, negative JVD, negative carotid bruits,    Chest: bibasilar crackles    Cardiovascular: regular rate and rhythm, neg murmurs/rubs/gallops    Abdomen: soft, + epigastric ttp, negative rebound/guarding, non distended, normal bowel sounds, neg hepatosplenomegaly, midline well healed surgical scar    Extremities: WWP, neg cyanosis/clubbing/edema, negative calf tenderness to palpation, negative Myron's sign    Neurologic Exam:    Alert and oriented to person, place, date/year, speech fluent w/o dysarthria, repetition intact, comprehension intact,     Cranial Nerves:     II:                      pupils equal, round and reactive to light, visual fields intact   III/ IV/VI:            extraocular movements intact, neg nystagmus, ptosis  V:                     facial sensation intact, V1-3 normal  VII:                    face symmetric, no droop, normal eye closure and smile  VIII:                   hearing intact to finger rub bilaterally  IX/ X:                 soft palate rise symmetrical  XI:                     head turning, shoulder shrug normal  XII:                    tongue midline    Motor Exam:    Bilateral UE:        4/5 /intrinsics                            4+/5 biceps/triceps/wrist extensors-flexors/deltoid                            negative pronator drift      Left LE:               3+-4-/5 hip flexors/adductors/abductors                            4-/5 quadriceps/hamstrings                            4-/5 dorsiflexors/plantar flexors/invertors-evertors    Sensory: intact to LT/PP in all UE/LE dermatomes    DTR:       = biceps/     triceps/     brachioradialis                 = patella/   medial hamstring/    ankle                 neg clonus                 neg Babinski                 neg Hoffmans    Finger to Nose: wnl    Joint Position Sense:  intact    Gait:  NT        PM&R Impression:    1) deconditioned  2) M.S.  3) gait dysfunction      Recommendations:    1) Physical therapy focusing on therapeutic exercises, bed mobility/transfer out of bed evaluation, progressive ambulation with assistive devices.    2) Disposition Plan: pending functional progress

## 2017-01-26 NOTE — PROGRESS NOTE ADULT - SUBJECTIVE AND OBJECTIVE BOX
OVERNIGHT EVENTS: Admitted overnight.    SUBJECTIVE: Complains of left hand pain from IV infiltration. Complains of right calf pain, diffuse abdominal pain.     VITAL SIGNS: Last 24 Hrs  T(F): 97.6, Max: 99 (01-25 @ 14:58)  HR: 70 (70 - 115)  BP: 101/65 (91/60 - 106/65)  RR: 19 (17 - 19)  SpO2: 100% (92% - 100%)    PHYSICAL EXAM:  Constitutional: WDWN, moderate distress due to pain  HEENT: NCAT, PEERL, sclera non-icteric, right scleral hemorrhage, no conjunctival pallor, dry MM  Respiratory: CTA b/l, no wheezes, bi-basilar crackles  Cardiovascular: RRR, normal s1/s2, no MRG  Gastrointestinal: soft, ND, +BS, diffuse abdominal tenderness, voluntary guarding, no organomegaly  Extremities: WWP, DP/radial pulses 2+ b/l, no edema  Neurological: AAOx 3, responds to commands, moves all extremities, CN 2-12 intact  Musculoskeletal: 5/5 in UE b/l, 4/5 in LE b/l    MEDICATIONS  (STANDING):  vancomycin  IVPB 1000milliGRAM(s) IV Intermittent every 12 hours  piperacillin/tazobactam IVPB. 4.5Gram(s) IV Intermittent every 6 hours  sodium chloride 0.9%. 1000milliLiter(s) IV Continuous <Continuous>  insulin lispro (HumaLOG) corrective regimen sliding scale  SubCutaneous Before meals and at bedtime  ALBUTerol/ipratropium for Nebulization 3milliLiter(s) Nebulizer every 4 hours  cholecalciferol 1000Unit(s) Oral daily  levETIRAcetam 1000milliGRAM(s) Oral two times a day    ALLERGIES: dust (Unknown)  latex (Unknown)  No Known Drug Allergies  Nuts (Anaphylaxis)  peanuts (Unknown)    LABS: OVERNIGHT EVENTS: Admitted overnight.    SUBJECTIVE: Complains of left hand pain from IV infiltration. Complains of right calf pain, diffuse abdominal pain.     VITAL SIGNS: Last 24 Hrs  T(F): 97.6, Max: 99 (01-25 @ 14:58)  HR: 70 (70 - 115)  BP: 101/65 (91/60 - 106/65)  RR: 19 (17 - 19)  SpO2: 100% (92% - 100%)    PHYSICAL EXAM:  Constitutional: WDWN, moderate distress due to pain  HEENT: NCAT, PEERL, sclera non-icteric, right scleral hemorrhage, no conjunctival pallor, dry MM  Respiratory: CTA b/l, no wheezes, bi-basilar crackles  Cardiovascular: RRR, normal s1/s2, no MRG  Gastrointestinal: soft, ND, +BS, diffuse abdominal tenderness, voluntary guarding, no organomegaly  Extremities: WWP, DP/radial pulses 2+ b/l, no edema  Neurological: AAOx 3, responds to commands, moves all extremities, CN 2-12 intact  Musculoskeletal: 5/5 in UE b/l, 4/5 in LE b/l    MEDICATIONS  (STANDING):  vancomycin  IVPB 1000milliGRAM(s) IV Intermittent every 12 hours  piperacillin/tazobactam IVPB. 4.5Gram(s) IV Intermittent every 6 hours  sodium chloride 0.9%. 1000milliLiter(s) IV Continuous <Continuous>  insulin lispro (HumaLOG) corrective regimen sliding scale  SubCutaneous Before meals and at bedtime  ALBUTerol/ipratropium for Nebulization 3milliLiter(s) Nebulizer every 4 hours  cholecalciferol 1000Unit(s) Oral daily  levETIRAcetam 1000milliGRAM(s) Oral two times a day    ALLERGIES: dust (Unknown)  latex (Unknown)  No Known Drug Allergies  Nuts (Anaphylaxis)  peanuts (Unknown)    LABS:                8.7    2.3   )-----------( 205      ( 26 Jan 2017 12:40 )             28.9     26 Jan 2017 12:40    143    |  111    |  10     ----------------------------<  38     4.5     |  25     |  0.56     Ca    7.4        26 Jan 2017 12:40  Phos  3.8       25 Jan 2017 16:23  Mg     2.6       26 Jan 2017 12:40    TPro  5.8    /  Alb  2.6    /  TBili  0.2    /  DBili  x      /  AST  115    /  ALT  66     /  AlkPhos  56     26 Jan 2017 12:40    PT/INR - ( 25 Jan 2017 16:23 )   PT: 11.7 sec;   INR: 1.05          PTT - ( 25 Jan 2017 16:23 )  PTT:32.6 sec    RADIOLOGY & ADDITIONAL TESTS:

## 2017-01-26 NOTE — PROGRESS NOTE ADULT - ASSESSMENT
60 y/o F w/ PMH of anemia, asthma, DVT, MS, multiple SBO's s/p multiple abdominal surgeries, and recent CVA admitted for SOB and cough who presented to the ED today after being sent in from Dr. Garay's office for tachycardia, low blood pressure and complaint of cough and sob.

## 2017-01-26 NOTE — CONSULT NOTE ADULT - SUBJECTIVE AND OBJECTIVE BOX
HPI:  Pt is a 61 year old female with PMH of anemia, asthma, DVT with noncompliance with AC, MS, multiple SBO's s/p multiple abdominal surgeries and recent CVA admitted to [facility] about 4 weeks ago for 4-6weeks who presented to the ED  after being sent in from Dr. Garay's office for tachycardia, low blood pressure and complaint of cough and sob.  Pt stated she has been having a cough with yellow phlegm since Last friday along with f/c/s and congestion and myalgias.  . She denied wheezing at home or increased use of her inhalers.  Pt also stated she was admitted at [facility] about 4 weeks ago for a stroke that she had and was told she had a brain bleed but unclear if actual brain bleed as patient stated she was also taking lovenox at home.  Since her stroke about 4 weeks ago the patient has admitted to having continued lower extremity weakness and numbness in her toes up to mid shin with difficulty walking and getting out of bed.  Pt denied back pain.  She also stated she has had multiple SBO's about 6 of them and that she hasn't had a BM in about one week and has had a diffuse cramping abdominal pain for the last few days with an episode of nausea last week, but no vomiting.  She admitted to decreased PO intake at home. Called to evaluate anemia.  Per patient she has not have an egd and colonoscopy?    REVIEW OF SYSTEMS:  Constitutional: No fever, + weight loss   ENMT:  No difficulty hearing, tinnitus, vertigo; No sinus or throat pain  Respiratory: No cough, wheezing, chills or hemoptysis  Cardiovascular: No chest pain, palpitations, dizziness or leg swelling  Gastrointestinal:complaining of  pain. No nausea, vomiting or hematemesis; No diarrhea or constipation. No melena or hematochezia.  Skin: No itching, burning, rashes or lesions   Musculoskeletal: No joint pain or swelling; No muscle, back or extremity pain    PAST MEDICAL & SURGICAL HISTORY:  CVA (cerebral vascular accident)  Anemia  G tube feedings  History of DVT (deep vein thrombosis)  MS (multiple sclerosis)  Small bowel obstruction: multiple, recurrent  Asthma  SBO (small bowel obstruction): multiple      FAMILY HISTORY:  No pertinent family history in first degree relatives      SOCIAL HISTORY:  Smoking Status: [ ] Current, [ ] Former, [ ] Never  Pack Years:    MEDICATIONS:  MEDICATIONS  (STANDING):  vancomycin  IVPB 1000milliGRAM(s) IV Intermittent every 12 hours  piperacillin/tazobactam IVPB.  IV Intermittent   sodium chloride 0.9%. 1000milliLiter(s) IV Continuous <Continuous>  piperacillin/tazobactam IVPB. 4.5Gram(s) IV Intermittent every 6 hours  insulin lispro (HumaLOG) corrective regimen sliding scale  SubCutaneous Before meals and at bedtime  ALBUTerol/ipratropium for Nebulization 3milliLiter(s) Nebulizer every 4 hours  cholecalciferol 1000Unit(s) Oral daily  levETIRAcetam 1000milliGRAM(s) Oral two times a day  polyethylene glycol 3350 17Gram(s) Oral daily    MEDICATIONS  (PRN):  methadone 10milliGRAM(s) Oral every 6 hours PRN pain      Allergies    dust (Unknown)  latex (Unknown)  No Known Drug Allergies  Nuts (Anaphylaxis)  peanuts (Unknown)    Intolerances        Vital Signs Last 24 Hrs  T(C): 36.1, Max: 37.2 (01-25 @ 14:58)  T(F): 97, Max: 99 (01-25 @ 14:58)  HR: 74 (70 - 115)  BP: 100/60 (91/60 - 106/65)  BP(mean): 73 (73 - 73)  RR: 19 (17 - 19)  SpO2: 96% (92% - 100%)  I & Os for 24h ending 01-26 @ 07:00  =============================================  IN: 1000 ml / OUT: 675 ml / NET: 325 ml    I & Os for current day (as of 01-26 @ 13:18)  =============================================  IN: 300 ml / OUT: 0 ml / NET: 300 ml        PHYSICAL EXAM:    General: poorlynourished; in no acute distress  HEENT: MMM, conjunctiva and sclera clear  Gastrointestinal: Soft, non-tender non-distended; Normal bowel sounds; No rebound or guarding  Extremities: Normal range of motion, No clubbing, cyanosis or edema  Neurological: Alert and oriented x3  Skin: Warm and dry. No obvious rash      LABS:                        8.7    2.3   )-----------( 205      ( 26 Jan 2017 12:40 )             28.9     26 Jan 2017 12:40    143    |  111    |  10     ----------------------------<  38     4.5     |  25     |  0.56     Ca    7.4        26 Jan 2017 12:40  Phos  3.8       25 Jan 2017 16:23  Mg     2.6       26 Jan 2017 12:40    TPro  5.8    /  Alb  2.6    /  TBili  0.2    /  DBili  x      /  AST  115    /  ALT  66     /  AlkPhos  56     26 Jan 2017 12:40          RADIOLOGY & ADDITIONAL STUDIES:

## 2017-01-27 DIAGNOSIS — J18.9 PNEUMONIA, UNSPECIFIED ORGANISM: ICD-10-CM

## 2017-01-27 DIAGNOSIS — J11.1 INFLUENZA DUE TO UNIDENTIFIED INFLUENZA VIRUS WITH OTHER RESPIRATORY MANIFESTATIONS: ICD-10-CM

## 2017-01-27 LAB
ANION GAP SERPL CALC-SCNC: 8 MMOL/L — LOW (ref 9–16)
BUN SERPL-MCNC: 8 MG/DL — SIGNIFICANT CHANGE UP (ref 7–23)
CALCIUM SERPL-MCNC: 8.7 MG/DL — SIGNIFICANT CHANGE UP (ref 8.5–10.5)
CHLORIDE SERPL-SCNC: 108 MMOL/L — SIGNIFICANT CHANGE UP (ref 96–108)
CO2 SERPL-SCNC: 25 MMOL/L — SIGNIFICANT CHANGE UP (ref 22–31)
CREAT SERPL-MCNC: 0.62 MG/DL — SIGNIFICANT CHANGE UP (ref 0.5–1.3)
GLUCOSE SERPL-MCNC: 142 MG/DL — HIGH (ref 70–99)
HCT VFR BLD CALC: 35.2 % — SIGNIFICANT CHANGE UP (ref 34.5–45)
HGB BLD-MCNC: 11.1 G/DL — LOW (ref 11.5–15.5)
MAGNESIUM SERPL-MCNC: 2.6 MG/DL — HIGH (ref 1.6–2.4)
MCHC RBC-ENTMCNC: 27.1 PG — SIGNIFICANT CHANGE UP (ref 27–34)
MCHC RBC-ENTMCNC: 31.5 G/DL — LOW (ref 32–36)
MCV RBC AUTO: 86.1 FL — SIGNIFICANT CHANGE UP (ref 80–100)
PLATELET # BLD AUTO: 266 K/UL — SIGNIFICANT CHANGE UP (ref 150–400)
POTASSIUM SERPL-MCNC: 4.8 MMOL/L — SIGNIFICANT CHANGE UP (ref 3.5–5.3)
POTASSIUM SERPL-SCNC: 4.8 MMOL/L — SIGNIFICANT CHANGE UP (ref 3.5–5.3)
RBC # BLD: 4.09 M/UL — SIGNIFICANT CHANGE UP (ref 3.8–5.2)
RBC # FLD: 18.3 % — HIGH (ref 10.3–16.9)
SODIUM SERPL-SCNC: 141 MMOL/L — SIGNIFICANT CHANGE UP (ref 135–145)
VANCOMYCIN TROUGH SERPL-MCNC: 9.2 UG/ML — LOW (ref 10–20)
WBC # BLD: 1.7 K/UL — LOW (ref 3.8–10.5)
WBC # FLD AUTO: 1.7 K/UL — LOW (ref 3.8–10.5)

## 2017-01-27 PROCEDURE — 99232 SBSQ HOSP IP/OBS MODERATE 35: CPT

## 2017-01-27 PROCEDURE — 93880 EXTRACRANIAL BILAT STUDY: CPT | Mod: 26

## 2017-01-27 PROCEDURE — 93306 TTE W/DOPPLER COMPLETE: CPT | Mod: 26

## 2017-01-27 RX ORDER — SODIUM CHLORIDE 9 MG/ML
1000 INJECTION INTRAMUSCULAR; INTRAVENOUS; SUBCUTANEOUS
Qty: 0 | Refills: 0 | Status: DISCONTINUED | OUTPATIENT
Start: 2017-01-27 | End: 2017-01-27

## 2017-01-27 RX ORDER — ZOLPIDEM TARTRATE 10 MG/1
5 TABLET ORAL AT BEDTIME
Qty: 0 | Refills: 0 | Status: DISCONTINUED | OUTPATIENT
Start: 2017-01-27 | End: 2017-02-02

## 2017-01-27 RX ORDER — VANCOMYCIN HCL 1 G
1250 VIAL (EA) INTRAVENOUS EVERY 12 HOURS
Qty: 0 | Refills: 0 | Status: COMPLETED | OUTPATIENT
Start: 2017-01-27 | End: 2017-01-29

## 2017-01-27 RX ORDER — POLYETHYLENE GLYCOL 3350 17 G/17G
17 POWDER, FOR SOLUTION ORAL
Qty: 0 | Refills: 0 | Status: DISCONTINUED | OUTPATIENT
Start: 2017-01-27 | End: 2017-02-03

## 2017-01-27 RX ADMIN — Medication 3 MILLILITER(S): at 21:47

## 2017-01-27 RX ADMIN — Medication 1000 UNIT(S): at 12:27

## 2017-01-27 RX ADMIN — METHADONE HYDROCHLORIDE 10 MILLIGRAM(S): 40 TABLET ORAL at 12:27

## 2017-01-27 RX ADMIN — PIPERACILLIN AND TAZOBACTAM 200 GRAM(S): 4; .5 INJECTION, POWDER, LYOPHILIZED, FOR SOLUTION INTRAVENOUS at 17:12

## 2017-01-27 RX ADMIN — Medication 325 MILLIGRAM(S): at 11:36

## 2017-01-27 RX ADMIN — Medication 1: at 13:39

## 2017-01-27 RX ADMIN — METHADONE HYDROCHLORIDE 10 MILLIGRAM(S): 40 TABLET ORAL at 23:27

## 2017-01-27 RX ADMIN — METHADONE HYDROCHLORIDE 10 MILLIGRAM(S): 40 TABLET ORAL at 17:11

## 2017-01-27 RX ADMIN — Medication 3 MILLILITER(S): at 01:17

## 2017-01-27 RX ADMIN — LEVETIRACETAM 1000 MILLIGRAM(S): 250 TABLET, FILM COATED ORAL at 17:11

## 2017-01-27 RX ADMIN — Medication 75 MILLIGRAM(S): at 11:36

## 2017-01-27 RX ADMIN — HEPARIN SODIUM 5000 UNIT(S): 5000 INJECTION INTRAVENOUS; SUBCUTANEOUS at 21:48

## 2017-01-27 RX ADMIN — Medication 1 PUFF(S): at 14:08

## 2017-01-27 RX ADMIN — Medication 3 MILLILITER(S): at 13:39

## 2017-01-27 RX ADMIN — LEVETIRACETAM 1000 MILLIGRAM(S): 250 TABLET, FILM COATED ORAL at 06:11

## 2017-01-27 RX ADMIN — Medication 3 MILLILITER(S): at 06:11

## 2017-01-27 RX ADMIN — PANTOPRAZOLE SODIUM 40 MILLIGRAM(S): 20 TABLET, DELAYED RELEASE ORAL at 06:12

## 2017-01-27 RX ADMIN — Medication 50 MILLIGRAM(S): at 06:11

## 2017-01-27 RX ADMIN — HEPARIN SODIUM 5000 UNIT(S): 5000 INJECTION INTRAVENOUS; SUBCUTANEOUS at 06:11

## 2017-01-27 RX ADMIN — HEPARIN SODIUM 5000 UNIT(S): 5000 INJECTION INTRAVENOUS; SUBCUTANEOUS at 13:40

## 2017-01-27 RX ADMIN — Medication 3 MILLILITER(S): at 11:42

## 2017-01-27 RX ADMIN — PIPERACILLIN AND TAZOBACTAM 200 GRAM(S): 4; .5 INJECTION, POWDER, LYOPHILIZED, FOR SOLUTION INTRAVENOUS at 06:12

## 2017-01-27 RX ADMIN — PIPERACILLIN AND TAZOBACTAM 200 GRAM(S): 4; .5 INJECTION, POWDER, LYOPHILIZED, FOR SOLUTION INTRAVENOUS at 11:36

## 2017-01-27 RX ADMIN — Medication 75 MILLIGRAM(S): at 17:11

## 2017-01-27 RX ADMIN — Medication 250 MILLIGRAM(S): at 01:42

## 2017-01-27 NOTE — PROGRESS NOTE ADULT - SUBJECTIVE AND OBJECTIVE BOX
Complains of persistent pain.   Vital Signs Last 24 Hrs  T(C): 35.6, Max: 36.6 (01-27 @ 05:23)  T(F): 96.1, Max: 97.9 (01-27 @ 05:23)  HR: 70 (70 - 83)  BP: 117/69 (117/69 - 131/76)  BP(mean): --  RR: 16 (14 - 17)  SpO2: 95% (95% - 98%)  PHYSICAL EXAM:  Constitutional: WDWN, moderate distress due to pain  HEENT: sclera non-icteric, right scleral hemorrhage, no conjunctival pallor, dry MM  Respiratory: CTA b/l, no wheezes, bi-basilar crackles  Cardiovascular: RRR, normal s1/s2, no MRG  Gastrointestinal: soft, ND, +BS, diffuse abdominal tenderness, voluntary guarding, no organomegaly  Extremities: WWP, DP/radial pulses 2+ b/l, no edema  Neurological: AAOx 3, responds to commands, moves all extremities, CN 2-12 intact  Musculoskeletal: 5/5 in UE b/l, 4/5 in LE b/l    MEDICATIONS  (STANDING):  piperacillin/tazobactam IVPB.  IV Intermittent   piperacillin/tazobactam IVPB. 4.5Gram(s) IV Intermittent every 6 hours  insulin lispro (HumaLOG) corrective regimen sliding scale  SubCutaneous Before meals and at bedtime  ALBUTerol/ipratropium for Nebulization 3milliLiter(s) Nebulizer every 4 hours  cholecalciferol 1000Unit(s) Oral daily  levETIRAcetam 1000milliGRAM(s) Oral two times a day  polyethylene glycol 3350 17Gram(s) Oral daily  ferrous    sulfate 325milliGRAM(s) Oral daily  oseltamivir 75milliGRAM(s) Oral two times a day  pantoprazole    Tablet 40milliGRAM(s) Oral before breakfast  buDESOnide  180 MICROgram(s) Inhaler 1Puff(s) Inhalation daily  heparin  Injectable 5000Unit(s) SubCutaneous every 8 hours    MEDICATIONS  (PRN):  methadone 10milliGRAM(s) Oral every 6 hours PRN pain    ALLERGIES: dust (Unknown)  latex (Unknown)  No Known Drug Allergies  Nuts (Anaphylaxis)  peanuts (Unknown)    LABS: Pending

## 2017-01-27 NOTE — CONSULT NOTE ADULT - SUBJECTIVE AND OBJECTIVE BOX
HPI:  Pt is a 61 year old female with PMH of anemia, asthma, DVT with noncompliance with AC, MS, multiple SBO's s/p multiple abdominal surgeries and recent CVA admitted to [facility] about 4 weeks ago for 4-6weeks who presented to the ED today after being sent in from Dr. Garay's office for tachycardia, low blood pressure and complaint of cough and sob.  Pt stated she has been having a cough with yellow phlegm since Last friday along with f/c/s and congestion and myalgias.  Pt stated her sister also was sick last week with similar symptoms. She denied wheezing at home or increased use of her inhalers.  Pt also stated she was admitted at [facility] about 4 weeks ago for a stroke that she had and was told she had a brain bleed but unclear if actual brain bleed as patient stated she was also taking lovenox at home.  Since her stroke about 4 weeks ago the patient has admitted to having continued lower extremity weakness and numbness in her toes up to mid shin with difficulty walking and getting out of bed.  Pt denied back pain.  She also stated she has had multiple SBO's about 6 of them and that she hasn't had a BM in about one week and has had a diffuse cramping abdominal pain for the last few days with an episode of nausea last week, but no vomiting.  She admitted to decreased PO intake at home.  In the ED patient had a CT angio to r/o PE and vital signs were Tmax: 99.0, HR: , BP 98/61, RR 18 and sO2 of 92% room air and 98% on 2L NC. (25 Jan 2017 22:05)    Dxed with influenza and pnumonia  Subjectively better now  States admitted to [facility] recently with infection    ROS otherwise neg    PAST MEDICAL & SURGICAL HISTORY:  CVA (cerebral vascular accident)  Anemia  G tube feedings  History of DVT (deep vein thrombosis)  MS (multiple sclerosis)  Small bowel obstruction: multiple, recurrent  Asthma  SBO (small bowel obstruction): multiple      MEDICATIONS  (STANDING):  piperacillin/tazobactam IVPB.  IV Intermittent   piperacillin/tazobactam IVPB. 4.5Gram(s) IV Intermittent every 6 hours  insulin lispro (HumaLOG) corrective regimen sliding scale  SubCutaneous Before meals and at bedtime  ALBUTerol/ipratropium for Nebulization 3milliLiter(s) Nebulizer every 4 hours  cholecalciferol 1000Unit(s) Oral daily  levETIRAcetam 1000milliGRAM(s) Oral two times a day  ferrous    sulfate 325milliGRAM(s) Oral daily  oseltamivir 75milliGRAM(s) Oral two times a day  pantoprazole    Tablet 40milliGRAM(s) Oral before breakfast  buDESOnide  180 MICROgram(s) Inhaler 1Puff(s) Inhalation daily  heparin  Injectable 5000Unit(s) SubCutaneous every 8 hours  methylPREDNISolone sodium succinate Injectable 40milliGRAM(s) IV Push every 8 hours  vancomycin  IVPB 1250milliGRAM(s) IV Intermittent every 12 hours  polyethylene glycol 3350 17Gram(s) Oral two times a day    MEDICATIONS  (PRN):  methadone 10milliGRAM(s) Oral every 6 hours PRN pain      Allergies    dust (Unknown)  latex (Unknown)  No Known Drug Allergies  Nuts (Anaphylaxis)  peanuts (Unknown)    SOCIAL HISTORY:  Lives at home  On Methadone    FAMILY HISTORY:  No pertinent family history in first degree relatives    EXAM  Vital Signs Last 24 Hrs  T(C): 35.6, Max: 36.6 (01-27 @ 05:23)  T(F): 96.1, Max: 97.9 (01-27 @ 05:23)  HR: 70 (70 - 83)  BP: 117/69 (117/69 - 131/76)  BP(mean): --  RR: 16 (14 - 17)  SpO2: 95% (95% - 98%)  Thin and frail  Awake and alert but slow speech and troubles with recall  Follows commands  Oral mucosa without thrush  RRR  Chest with poor insp effort  Abd soft ND NT  LE no edema    LABS:                        11.1   1.7   )-----------( 266      ( 27 Jan 2017 15:36 )             35.2     27 Jan 2017 15:36    141    |  108    |  8      ----------------------------<  142    4.8     |  25     |  0.62     Ca    8.7        27 Jan 2017 15:36  Mg     2.6       27 Jan 2017 15:36    TPro  5.8    /  Alb  2.6    /  TBili  0.2    /  DBili  x      /  AST  115    /  ALT  66     /  AlkPhos  56     26 Jan 2017 12:40    RVP positive for Influenza A3    EXAM:  CT ANGIO CHEST PE PROTOCOL IC                           PROCEDURE DATE:  01/25/2017    Quantity of Contrast in Vial in ml: 120 Contrast Used: Optiray 350  Quantity of Contrast Wasted in ml: 6       INTERPRETATION:  CT Pulmonary Angiography (CTPA) of the CHEST dated   1/25/2017 6:40 PM    INDICATION: r/o PE     TECHNIQUE: CT angiography of the pulmonary arteries was performed during   rapid bolus injection of intravenous contrast.  Post-processing including   the production of axial and coronal multiplanar reformatted images and   coronal and axial maximum intensity projections (MIPs) was performed.    PRIOR STUDY: None. In addition there is bilateral mosaic perfusion   pattern to suggest air-trapping from underlying small airway   inflammation. No consolidation or pleural collections. Linear atelectasis   and parenchymal scarring with distortion of the left major fissure focal   interrogation of the left hemidiaphragm is identified. There is a small   loculated left-sided pleuraleffusion. There is immediate adjacent   compressive atelectasis/early pneumonitis (sagittal image 38, series 8   and axial image 199, series 4).    FINDINGS:    LUNGS: There is evidence of small and large airway inflammation   characterized by bronchial wall thickening as well as scattered   tree-in-bud micronodular opacities. No pulmonary abnormalities are   evident.      MEDIASTINUM: Pulmonary arteries are of normal caliber. No  main, lobar or   segmental branch acute or chronic pulmonary embolism. No evidence of   right ventricular strain. The heart is normal in size.  No pericardial   effusion is seen. The thoracic aorta is normal in appearance. No   mediastinal, hilar or axillary lymphadenopathy is seen.    UPPER ABDOMEN, SOFT TISSUES AND BONES: Limited evaluation of the upper   abdomen demonstrates redemonstrated is an oval appearing cystic structure   demonstrating simple fluid attenuation within the subcapsular region of   segment 4A which appears slightly larger in size when compared to prior   study measuring 3.5 cm in AP dimension and 3.2 cm and prior examination   dated 2/10/2016. May be dilated tubular structure immediately anteriorly   possibly representing intrahepatic bile ductal dilatation. Surgical clips   are noted in the region of the stomach to suggest gastric sleeve and   possibly gastrojejunostomy. Osseous structures demonstrate multiple   compression fracture deformities involving L1 T8 and T9.     IMPRESSION:    1. No evidence of pulmonary artery emboli. No evidence of right   ventricular strain.  2. Findings compatible with small large airway inflammation with   bronchial wall thickening and scattered tree-in-bud micronodular pattern.   There is a mucous plugging are noted within the left lower lobe.  3. Focal consolidation/atelectasis involving the left lower lobe with a   small possible left parapneumonic effusion.  4. Linear areas of scarring and/or atelectasis in the left lower lung   zone.  5. Multiple low-attenuation intrahepatic structures possibly representing   cysts the largest of which has increased in size when compared to   2/10/2016 measuring up to 3.5 cm. There may be new and hepatic bile duct   dilatation. Further characterization of these findings with a nonemergent   hepatic ultrasound is suggested.

## 2017-01-27 NOTE — CONSULT NOTE ADULT - SUBJECTIVE AND OBJECTIVE BOX
· Subjective and Objective: 	  HPI:  Pt is a 61 year old female with PMH of anemia, asthma, DVT with noncompliance with AC, MS, multiple SBO's s/p multiple abdominal surgeries and recent CVA admitted to [facility] about 4 weeks ago for 4-6weeks who presented to the ED today after being sent in from Dr. Garay's office for tachycardia, low blood pressure and complaint of cough and sob.  Pt stated she has been having a cough with yellow phlegm since Last friday along with f/c/s and congestion and myalgias.  Pt stated her sister also was sick last week with similar symptoms. She denied wheezing at home or increased use of her inhalers.  Pt also stated she was admitted at [facility] about 4 weeks ago for a stroke that she had and was told she had a brain bleed but unclear if actual brain bleed as patient stated she was also taking lovenox at home.  Since her stroke about 4 weeks ago the patient has admitted to having continued lower extremity weakness and numbness in her toes up to mid shin with difficulty walking and getting out of bed.  Pt denied back pain.  She also stated she has had multiple SBO's about 6 of them and that she hasn't had a BM in about one week and has had a diffuse cramping abdominal pain for the last few days with an episode of nausea last week, but no vomiting.  She admitted to decreased PO intake at home.  In the ED patient had a CT angio to r/o PE and vital signs were Tmax: 99.0, HR: , BP 98/61, RR 18 and sO2 of 92% room air and 98% on 2L NC. (25 Jan 2017 22:05)    Dxed with influenza and pneumonia  Subjectively better now  States admitted to [facility] recently with infection  No s/s of bleeding noted,    ROS otherwise neg    PAST MEDICAL & SURGICAL HISTORY:  CVA (cerebral vascular accident)  Anemia  G tube feedings  History of DVT (deep vein thrombosis)  MS (multiple sclerosis)  Small bowel obstruction: multiple, recurrent  Asthma  SBO (small bowel obstruction): multiple      MEDICATIONS  (STANDING):  piperacillin/tazobactam IVPB.  IV Intermittent   piperacillin/tazobactam IVPB. 4.5Gram(s) IV Intermittent every 6 hours  insulin lispro (HumaLOG) corrective regimen sliding scale  SubCutaneous Before meals and at bedtime  ALBUTerol/ipratropium for Nebulization 3milliLiter(s) Nebulizer every 4 hours  cholecalciferol 1000Unit(s) Oral daily  levETIRAcetam 1000milliGRAM(s) Oral two times a day  ferrous    sulfate 325milliGRAM(s) Oral daily  oseltamivir 75milliGRAM(s) Oral two times a day  pantoprazole    Tablet 40milliGRAM(s) Oral before breakfast  buDESOnide  180 MICROgram(s) Inhaler 1Puff(s) Inhalation daily  heparin  Injectable 5000Unit(s) SubCutaneous every 8 hours  methylPREDNISolone sodium succinate Injectable 40milliGRAM(s) IV Push every 8 hours  vancomycin  IVPB 1250milliGRAM(s) IV Intermittent every 12 hours  polyethylene glycol 3350 17Gram(s) Oral two times a day    MEDICATIONS  (PRN):  methadone 10milliGRAM(s) Oral every 6 hours PRN pain      Allergies    dust (Unknown)  latex (Unknown)  No Known Drug Allergies  Nuts (Anaphylaxis)  peanuts (Unknown)    SOCIAL HISTORY:  Lives at home  On Methadone    FAMILY HISTORY:  No pertinent family history in first degree relatives    EXAM  Vital Signs Last 24 Hrs  T(C): 35.6, Max: 36.6 (01-27 @ 05:23)  T(F): 96.1, Max: 97.9 (01-27 @ 05:23)  HR: 70 (70 - 83)  BP: 117/69 (117/69 - 131/76)  BP(mean): --  RR: 16 (14 - 17)  SpO2: 95% (95% - 98%)  Thin and frail  Awake and alert but slow speech and troubles with recall  Follows commands  Oral mucosa without thrush  RRR  Chest with poor insp effort  Abd soft ND NT  LE no edema    LABS:                        11.1   1.7   )-----------( 266      ( 27 Jan 2017 15:36 )             35.2     27 Jan 2017 15:36    141    |  108    |  8      ----------------------------<  142    4.8     |  25     |  0.62     Ca    8.7        27 Jan 2017 15:36  Mg     2.6       27 Jan 2017 15:36    TPro  5.8    /  Alb  2.6    /  TBili  0.2    /  DBili  x      /  AST  115    /  ALT  66     /  AlkPhos  56     26 Jan 2017 12:40    RVP positive for Influenza A3    EXAM:  CT ANGIO CHEST PE PROTOCOL IC                           PROCEDURE DATE:  01/25/2017    Quantity of Contrast in Vial in ml: 120 Contrast Used: Optiray 350  Quantity of Contrast Wasted in ml: 6       INTERPRETATION:  CT Pulmonary Angiography (CTPA) of the CHEST dated   1/25/2017 6:40 PM    INDICATION: r/o PE     TECHNIQUE: CT angiography of the pulmonary arteries was performed during   rapid bolus injection of intravenous contrast.  Post-processing including   the production of axial and coronal multiplanar reformatted images and   coronal and axial maximum intensity projections (MIPs) was performed.    PRIOR STUDY: None. In addition there is bilateral mosaic perfusion   pattern to suggest air-trapping from underlying small airway   inflammation. No consolidation or pleural collections. Linear atelectasis   and parenchymal scarring with distortion of the left major fissure focal   interrogation of the left hemidiaphragm is identified. There is a small   loculated left-sided pleuraleffusion. There is immediate adjacent   compressive atelectasis/early pneumonitis (sagittal image 38, series 8   and axial image 199, series 4).    FINDINGS:    LUNGS: There is evidence of small and large airway inflammation   characterized by bronchial wall thickening as well as scattered   tree-in-bud micronodular opacities. No pulmonary abnormalities are   evident.      MEDIASTINUM: Pulmonary arteries are of normal caliber. No  main, lobar or   segmental branch acute or chronic pulmonary embolism. No evidence of   right ventricular strain. The heart is normal in size.  No pericardial   effusion is seen. The thoracic aorta is normal in appearance. No   mediastinal, hilar or axillary lymphadenopathy is seen.    UPPER ABDOMEN, SOFT TISSUES AND BONES: Limited evaluation of the upper   abdomen demonstrates redemonstrated is an oval appearing cystic structure   demonstrating simple fluid attenuation within the subcapsular region of   segment 4A which appears slightly larger in size when compared to prior   study measuring 3.5 cm in AP dimension and 3.2 cm and prior examination   dated 2/10/2016. May be dilated tubular structure immediately anteriorly   possibly representing intrahepatic bile ductal dilatation. Surgical clips   are noted in the region of the stomach to suggest gastric sleeve and   possibly gastrojejunostomy. Osseous structures demonstrate multiple   compression fracture deformities involving L1 T8 and T9.     IMPRESSION:    1. No evidence of pulmonary artery emboli. No evidence of right   ventricular strain.  2. Findings compatible with small large airway inflammation with   bronchial wall thickening and scattered tree-in-bud micronodular pattern.   There is a mucous plugging are noted within the left lower lobe.  3. Focal consolidation/atelectasis involving the left lower lobe with a   small possible left parapneumonic effusion.  4. Linear areas of scarring and/or atelectasis in the left lower lung   zone.  5. Multiple low-attenuation intrahepatic structures possibly representing   cysts the largest of which has increased in size when compared to   2/10/2016 measuring up to 3.5 cm. There may be new and hepatic bile duct   dilatation. Further characterization of these findings with a nonemergent   hepatic ultrasound is suggested.        Assessment and Recommendation:   · Assessment		  Leukopenia/Neutropenia/Anemia  Acute Influenza A virus infection  Consolidative/lobar pneumonia  Chronic airway disease    RECOMMEND  Continue Antimicrobials as per ID  Screen for HIV / Hepatitis panel  Manual diff/blood smear  Check Iron panel/SPEP/ IPEP/ImmunofixationFlow cytometry  May require bone marrow biopsy       Attending Attestation:   I have seen and examined the patient in person.

## 2017-01-27 NOTE — PROGRESS NOTE ADULT - SUBJECTIVE AND OBJECTIVE BOX
Pt seen and examined     REVIEW OF SYSTEMS:  Constitutional: No fever  Cardiovascular: No chest pain, palpitations, dizziness or leg swelling  Gastrointestinal: No abdominal or epigastric pain. No nausea, vomiting or hematemesis; No diarrhea or constipation. No melena or hematochezia.  Skin: No itching, burning, rashes or lesions       MEDICATIONS:  MEDICATIONS  (STANDING):  piperacillin/tazobactam IVPB.  IV Intermittent   piperacillin/tazobactam IVPB. 4.5Gram(s) IV Intermittent every 6 hours  insulin lispro (HumaLOG) corrective regimen sliding scale  SubCutaneous Before meals and at bedtime  ALBUTerol/ipratropium for Nebulization 3milliLiter(s) Nebulizer every 4 hours  cholecalciferol 1000Unit(s) Oral daily  levETIRAcetam 1000milliGRAM(s) Oral two times a day  polyethylene glycol 3350 17Gram(s) Oral daily  ferrous    sulfate 325milliGRAM(s) Oral daily  oseltamivir 75milliGRAM(s) Oral two times a day  pantoprazole    Tablet 40milliGRAM(s) Oral before breakfast  buDESOnide  180 MICROgram(s) Inhaler 1Puff(s) Inhalation daily  heparin  Injectable 5000Unit(s) SubCutaneous every 8 hours    MEDICATIONS  (PRN):  methadone 10milliGRAM(s) Oral every 6 hours PRN pain      Allergies    dust (Unknown)  latex (Unknown)  No Known Drug Allergies  Nuts (Anaphylaxis)  peanuts (Unknown)    Intolerances        Vital Signs Last 24 Hrs  T(C): 36.6, Max: 36.6 (01-27 @ 05:23)  T(F): 97.9, Max: 97.9 (01-27 @ 05:23)  HR: 70 (70 - 70)  BP: 117/74 (101/63 - 117/74)  BP(mean): --  RR: 17 (16 - 17)  SpO2: 98% (96% - 98%)    I & Os for current day (as of 01-27 @ 10:35)  =============================================  IN: 1500 ml / OUT: 0 ml / NET: 1500 ml      PHYSICAL EXAM:    General: Well developed; well nourished; in no acute distress  HEENT: MMM, conjunctiva and sclera clear  Gastrointestinal: Soft non-tender non-distended; Normal bowel sounds; No hepatosplenomegaly  Skin: Warm and dry. No obvious rash    LABS:      CBC Full  -  ( 26 Jan 2017 12:40 )  WBC Count : 2.3 K/uL  Hemoglobin : 8.7 g/dL  Hematocrit : 28.9 %  Platelet Count - Automated : 205 K/uL  Mean Cell Volume : 88.7 fL  Mean Cell Hemoglobin : 26.7 pg  Mean Cell Hemoglobin Concentration : 30.1 g/dL  Auto Neutrophil # : x  Auto Lymphocyte # : x  Auto Monocyte # : x  Auto Eosinophil # : x  Auto Basophil # : x  Auto Neutrophil % : x  Auto Lymphocyte % : x  Auto Monocyte % : x  Auto Eosinophil % : x  Auto Basophil % : x    26 Jan 2017 12:40    143    |  111    |  10     ----------------------------<  38     4.5     |  25     |  0.56     Ca    7.4        26 Jan 2017 12:40  Phos  3.8       25 Jan 2017 16:23  Mg     2.6       26 Jan 2017 12:40    TPro  5.8    /  Alb  2.6    /  TBili  0.2    /  DBili  x      /  AST  115    /  ALT  66     /  AlkPhos  56     26 Jan 2017 12:40    PT/INR - ( 25 Jan 2017 16:23 )   PT: 11.7 sec;   INR: 1.05          PTT - ( 25 Jan 2017 16:23 )  PTT:32.6 sec                  RADIOLOGY & ADDITIONAL STUDIES (The following images were personally reviewed):

## 2017-01-27 NOTE — PROGRESS NOTE ADULT - ASSESSMENT
62 y/o F w/ PMH of anemia, asthma, DVT, MS, multiple SBO's s/p multiple abdominal surgeries, and recent CVA, initially admitted for SOB and cough, RVP positive for Influenza and clinically suspicious for a HCAP.

## 2017-01-27 NOTE — PROGRESS NOTE ADULT - SUBJECTIVE AND OBJECTIVE BOX
OVERNIGHT EVENTS: MARCELLUS    SUBJECTIVE: Complains of persistent pain.     VITAL SIGNS: Last 24 Hrs  T(F): 97.9, Max: 97.9 (01-27 @ 05:23)  HR: 70 (70 - 74)  BP: 117/74 (100/60 - 117/74)  RR: 17 (16 - 17)  SpO2: 98% (96% - 98%)    CAPILLARY BLOOD GLUCOSE  91 (08:12)  76 (22:18)  120 (17:57)    PHYSICAL EXAM:  Constitutional: WDWN, moderate distress due to pain  HEENT: sclera non-icteric, right scleral hemorrhage, no conjunctival pallor, dry MM  Respiratory: CTA b/l, no wheezes, bi-basilar crackles  Cardiovascular: RRR, normal s1/s2, no MRG  Gastrointestinal: soft, ND, +BS, diffuse abdominal tenderness, voluntary guarding, no organomegaly  Extremities: WWP, DP/radial pulses 2+ b/l, no edema  Neurological: AAOx 3, responds to commands, moves all extremities, CN 2-12 intact  Musculoskeletal: 5/5 in UE b/l, 4/5 in LE b/l    MEDICATIONS  (STANDING):  piperacillin/tazobactam IVPB.  IV Intermittent   piperacillin/tazobactam IVPB. 4.5Gram(s) IV Intermittent every 6 hours  insulin lispro (HumaLOG) corrective regimen sliding scale  SubCutaneous Before meals and at bedtime  ALBUTerol/ipratropium for Nebulization 3milliLiter(s) Nebulizer every 4 hours  cholecalciferol 1000Unit(s) Oral daily  levETIRAcetam 1000milliGRAM(s) Oral two times a day  polyethylene glycol 3350 17Gram(s) Oral daily  ferrous    sulfate 325milliGRAM(s) Oral daily  oseltamivir 75milliGRAM(s) Oral two times a day  pantoprazole    Tablet 40milliGRAM(s) Oral before breakfast  buDESOnide  180 MICROgram(s) Inhaler 1Puff(s) Inhalation daily  heparin  Injectable 5000Unit(s) SubCutaneous every 8 hours    MEDICATIONS  (PRN):  methadone 10milliGRAM(s) Oral every 6 hours PRN pain    ALLERGIES: dust (Unknown)  latex (Unknown)  No Known Drug Allergies  Nuts (Anaphylaxis)  peanuts (Unknown)    LABS: Pending

## 2017-01-27 NOTE — PROGRESS NOTE ADULT - SUBJECTIVE AND OBJECTIVE BOX
INTERVAL HPI: +wheexing, no cp palp  	  MEDICATIONS:    piperacillin/tazobactam IVPB.  IV Intermittent   piperacillin/tazobactam IVPB. 4.5Gram(s) IV Intermittent every 6 hours  oseltamivir 75milliGRAM(s) Oral two times a day    ALBUTerol/ipratropium for Nebulization 3milliLiter(s) Nebulizer every 4 hours  buDESOnide  180 MICROgram(s) Inhaler 1Puff(s) Inhalation daily    levETIRAcetam 1000milliGRAM(s) Oral two times a day  methadone 10milliGRAM(s) Oral every 6 hours PRN    polyethylene glycol 3350 17Gram(s) Oral daily  pantoprazole    Tablet 40milliGRAM(s) Oral before breakfast    insulin lispro (HumaLOG) corrective regimen sliding scale  SubCutaneous Before meals and at bedtime    cholecalciferol 1000Unit(s) Oral daily  ferrous    sulfate 325milliGRAM(s) Oral daily  heparin  Injectable 5000Unit(s) SubCutaneous every 8 hours      REVIEW OF SYSTEMS:  [x] As per HPI  CONSTITUTIONAL: No fever, weight loss, or fatigue  RESPIRATORY: No cough, wheezing, chills or hemoptysis; No Shortness of Breath  CARDIOVASCULAR: No chest pain, palpitations, dizziness, or leg swelling  GASTROINTESTINAL: No abdominal or epigastric pain. No nausea, vomiting, or hematemesis; No diarrhea or constipation. No melena or hematochezia.  MUSCULOSKELETAL: No joint pain or swelling; No muscle, back, or extremity pain  [x] All others negative	  [ ] Unable to obtain    PHYSICAL EXAM:  T(C): 36.6, Max: 36.6 (01-27 @ 05:23)  HR: 70 (70 - 70)  BP: 117/74 (101/63 - 117/74)  RR: 17 (16 - 17)  SpO2: 98% (96% - 98%)  Wt(kg): --  I&O's Summary    I & Os for current day (as of 27 Jan 2017 09:35)  =============================================  IN: 1500 ml / OUT: 0 ml / NET: 1500 ml        Appearance: Normal	  HEENT:   Normal oral mucosa  Cardiovascular: Normal S1 S2, No JVD, No murmurs, No edema  Respiratory: Lungs clear to auscultation	  Gastrointestinal:  Soft, Non-tender, + BS	  Extremities: Normal range of motion, No clubbing, cyanosis or edema  Vascular: Peripheral pulses palpable 2+ bilaterally    TELEMETRY: 	    ECG:    	  RADIOLOGY:   CXR:  CT:  US:    CARDIAC TESTING:  Echocardiogram: Normal left ventricular size and wall thickness.The left ventricular wall   motion is normal.The left ventricular ejection fraction is estimated to   be   55-60%The left atrial size is normal.The right atrium is dilated.The   right   ventricle is normal in size and function.Structurally normal aortic   valve.No   aortic regurgitation noted.Structurally normal mitral valve.There is mild   mitral regurgitation.Structurally normal tricuspid valve.There is no   echocardiographic evidence for pulmonary hypertension.Structurally normal   pulmonic valve.No pulmonic regurgitation noted.No aortic root   dilatation.The   inferior vena cava is normal in size (<2.1 cm) with normal inspiratory   collapse (>50%) consistent with normal right atrial pressure.  There is   no   pericardial effusion.There is a very small (2mm) m0bile echo density   attached   to the aortic sihe of the aortic valve. Small fibroelastoma? other small   mass?  Catheterization:  Stress Test:      LABS:	 	    CARDIAC MARKERS:                                  8.7    2.3   )-----------( 205      ( 26 Jan 2017 12:40 )             28.9     26 Jan 2017 12:40    143    |  111    |  10     ----------------------------<  38     4.5     |  25     |  0.56     Ca    7.4        26 Jan 2017 12:40  Phos  3.8       25 Jan 2017 16:23  Mg     2.6       26 Jan 2017 12:40    TPro  5.8    /  Alb  2.6    /  TBili  0.2    /  DBili  x      /  AST  115    /  ALT  66     /  AlkPhos  56     26 Jan 2017 12:40    proBNP:   Lipid Profile:   HgA1c: Hemoglobin A1C, Whole Blood: 4.9 % (01-26 @ 12:40)    TSH:     ASSESSMENT/PLAN: 	  Echo - unusuale AO mobile mass - ?etiology of CVA would recommend ADRYAN to clarify mass  HTN - at goal, observe  SOB - mucous plugging no PE  Hx DVT - dopplers neg  replete electrolytes

## 2017-01-27 NOTE — PROGRESS NOTE ADULT - ASSESSMENT
60 y/o F w/ PMH of anemia, asthma, DVT, MS, multiple SBO's s/p multiple abdominal surgeries, and recent CVA, initially admitted for SOB and cough, RVP positive for Influenza and clinically suspicious for a HCAP.

## 2017-01-27 NOTE — PROGRESS NOTE ADULT - SUBJECTIVE AND OBJECTIVE BOX
Neurology Follow up note    Name  MARK SNYDER    HPI:  Pt is a 61 year old female with PMH of anemia, asthma, DVT with noncompliance with AC, MS, multiple SBO's s/p multiple abdominal surgeries and recent CVA admitted to [facility] about 4 weeks ago for 4-6weeks who presented to the ED today after being sent in from Dr. Garay's office for tachycardia, low blood pressure and complaint of cough and sob.  Pt stated she has been having a cough with yellow phlegm since Last friday along with f/c/s and congestion and myalgias.  Pt stated her sister also was sick last week with similar symptoms. She denied wheezing at home or increased use of her inhalers.  Pt also stated she was admitted at [facility] about 4 weeks ago for a stroke that she had and was told she had a brain bleed but unclear if actual brain bleed as patient stated she was also taking lovenox at home.  Since her stroke about 4 weeks ago the patient has admitted to having continued lower extremity weakness and numbness in her toes up to mid shin with difficulty walking and getting out of bed.  Pt denied back pain.  She also stated she has had multiple SBO's about 6 of them and that she hasn't had a BM in about one week and has had a diffuse cramping abdominal pain for the last few days with an episode of nausea last week, but no vomiting.  She admitted to decreased PO intake at home.  In the ED patient had a CT angio to r/o PE and vital signs were Tmax: 99.0, HR: , BP 98/61, RR 18 and sO2 of 92% room air and 98% on 2L NC. (25 Jan 2017 22:05)      Interval History - no change in neuro status- no headaches or pain        REVIEW OF SYSTEMS    Vital Signs Last 24 Hrs  T(C): 36.6, Max: 36.6 (01-27 @ 05:23)  T(F): 97.9, Max: 97.9 (01-27 @ 05:23)  HR: 70 (70 - 74)  BP: 117/74 (100/60 - 117/74)  BP(mean): --  RR: 17 (16 - 17)  SpO2: 98% (96% - 98%)    Physical Exam-     Mental Status- awake- poor memory    Cranial Nerves- full lateral eye movements    Gait and station-n/a    Motor- moves all 4 extremities    Reflexes- decreased    Sensation- no sensory level    Coordination-no tremors    Vascular -    Medications  vancomycin  IVPB 1000milliGRAM(s) IV Intermittent every 12 hours  piperacillin/tazobactam IVPB.  IV Intermittent   piperacillin/tazobactam IVPB. 4.5Gram(s) IV Intermittent every 6 hours  insulin lispro (HumaLOG) corrective regimen sliding scale  SubCutaneous Before meals and at bedtime  ALBUTerol/ipratropium for Nebulization 3milliLiter(s) Nebulizer every 4 hours  cholecalciferol 1000Unit(s) Oral daily  levETIRAcetam 1000milliGRAM(s) Oral two times a day  methadone 10milliGRAM(s) Oral every 6 hours PRN  polyethylene glycol 3350 17Gram(s) Oral daily  ferrous    sulfate 325milliGRAM(s) Oral daily  oseltamivir 75milliGRAM(s) Oral two times a day  pantoprazole    Tablet 40milliGRAM(s) Oral before breakfast  buDESOnide  180 MICROgram(s) Inhaler 1Puff(s) Inhalation daily  predniSONE   Tablet 50milliGRAM(s) Oral daily  heparin  Injectable 5000Unit(s) SubCutaneous every 8 hours      Lab      Radiology    Assessment-Dementia    Plan- no further neuro studies needed

## 2017-01-28 RX ORDER — HYDROMORPHONE HYDROCHLORIDE 2 MG/ML
4 INJECTION INTRAMUSCULAR; INTRAVENOUS; SUBCUTANEOUS ONCE
Qty: 0 | Refills: 0 | Status: DISCONTINUED | OUTPATIENT
Start: 2017-01-28 | End: 2017-01-28

## 2017-01-28 RX ADMIN — METHADONE HYDROCHLORIDE 10 MILLIGRAM(S): 40 TABLET ORAL at 06:10

## 2017-01-28 RX ADMIN — Medication 40 MILLIGRAM(S): at 14:41

## 2017-01-28 RX ADMIN — Medication 40 MILLIGRAM(S): at 21:47

## 2017-01-28 RX ADMIN — Medication 1000 UNIT(S): at 11:33

## 2017-01-28 RX ADMIN — LEVETIRACETAM 1000 MILLIGRAM(S): 250 TABLET, FILM COATED ORAL at 18:42

## 2017-01-28 RX ADMIN — PIPERACILLIN AND TAZOBACTAM 200 GRAM(S): 4; .5 INJECTION, POWDER, LYOPHILIZED, FOR SOLUTION INTRAVENOUS at 01:24

## 2017-01-28 RX ADMIN — Medication 3 MILLILITER(S): at 20:39

## 2017-01-28 RX ADMIN — HEPARIN SODIUM 5000 UNIT(S): 5000 INJECTION INTRAVENOUS; SUBCUTANEOUS at 05:48

## 2017-01-28 RX ADMIN — Medication 1 PUFF(S): at 11:33

## 2017-01-28 RX ADMIN — Medication 3 MILLILITER(S): at 10:00

## 2017-01-28 RX ADMIN — Medication 3 MILLILITER(S): at 13:11

## 2017-01-28 RX ADMIN — HEPARIN SODIUM 5000 UNIT(S): 5000 INJECTION INTRAVENOUS; SUBCUTANEOUS at 21:47

## 2017-01-28 RX ADMIN — Medication 325 MILLIGRAM(S): at 11:33

## 2017-01-28 RX ADMIN — Medication 3 MILLILITER(S): at 16:43

## 2017-01-28 RX ADMIN — LEVETIRACETAM 1000 MILLIGRAM(S): 250 TABLET, FILM COATED ORAL at 05:53

## 2017-01-28 RX ADMIN — PIPERACILLIN AND TAZOBACTAM 200 GRAM(S): 4; .5 INJECTION, POWDER, LYOPHILIZED, FOR SOLUTION INTRAVENOUS at 05:48

## 2017-01-28 RX ADMIN — Medication 40 MILLIGRAM(S): at 05:51

## 2017-01-28 RX ADMIN — PANTOPRAZOLE SODIUM 40 MILLIGRAM(S): 20 TABLET, DELAYED RELEASE ORAL at 06:07

## 2017-01-28 RX ADMIN — Medication 166.67 MILLIGRAM(S): at 02:07

## 2017-01-28 RX ADMIN — Medication 75 MILLIGRAM(S): at 19:02

## 2017-01-28 RX ADMIN — PIPERACILLIN AND TAZOBACTAM 200 GRAM(S): 4; .5 INJECTION, POWDER, LYOPHILIZED, FOR SOLUTION INTRAVENOUS at 18:42

## 2017-01-28 RX ADMIN — METHADONE HYDROCHLORIDE 10 MILLIGRAM(S): 40 TABLET ORAL at 21:47

## 2017-01-28 RX ADMIN — POLYETHYLENE GLYCOL 3350 17 GRAM(S): 17 POWDER, FOR SOLUTION ORAL at 06:07

## 2017-01-28 RX ADMIN — POLYETHYLENE GLYCOL 3350 17 GRAM(S): 17 POWDER, FOR SOLUTION ORAL at 18:42

## 2017-01-28 RX ADMIN — Medication 40 MILLIGRAM(S): at 01:17

## 2017-01-28 RX ADMIN — HEPARIN SODIUM 5000 UNIT(S): 5000 INJECTION INTRAVENOUS; SUBCUTANEOUS at 14:41

## 2017-01-28 RX ADMIN — METHADONE HYDROCHLORIDE 10 MILLIGRAM(S): 40 TABLET ORAL at 14:49

## 2017-01-28 RX ADMIN — ZOLPIDEM TARTRATE 5 MILLIGRAM(S): 10 TABLET ORAL at 00:31

## 2017-01-28 RX ADMIN — Medication 3 MILLILITER(S): at 05:45

## 2017-01-28 RX ADMIN — Medication 3 MILLILITER(S): at 00:34

## 2017-01-28 RX ADMIN — Medication 75 MILLIGRAM(S): at 05:50

## 2017-01-28 RX ADMIN — Medication 166.67 MILLIGRAM(S): at 16:42

## 2017-01-28 RX ADMIN — PIPERACILLIN AND TAZOBACTAM 200 GRAM(S): 4; .5 INJECTION, POWDER, LYOPHILIZED, FOR SOLUTION INTRAVENOUS at 12:57

## 2017-01-28 NOTE — PROGRESS NOTE ADULT - SUBJECTIVE AND OBJECTIVE BOX
INTERVAL HISTORY:61yFemalePatient is a 61y old  Female who presents with a chief complaint of shortness of breath (26 Jan 2017 02:21)    	  MEDICATIONS:    piperacillin/tazobactam IVPB.  IV Intermittent   piperacillin/tazobactam IVPB. 4.5Gram(s) IV Intermittent every 6 hours  oseltamivir 75milliGRAM(s) Oral two times a day  vancomycin  IVPB 1250milliGRAM(s) IV Intermittent every 12 hours    ALBUTerol/ipratropium for Nebulization 3milliLiter(s) Nebulizer every 4 hours  buDESOnide  180 MICROgram(s) Inhaler 1Puff(s) Inhalation daily    levETIRAcetam 1000milliGRAM(s) Oral two times a day  methadone 10milliGRAM(s) Oral every 6 hours PRN  zolpidem 5milliGRAM(s) Oral at bedtime PRN    pantoprazole    Tablet 40milliGRAM(s) Oral before breakfast  polyethylene glycol 3350 17Gram(s) Oral two times a day    insulin lispro (HumaLOG) corrective regimen sliding scale  SubCutaneous Before meals and at bedtime  methylPREDNISolone sodium succinate Injectable 40milliGRAM(s) IV Push every 8 hours    cholecalciferol 1000Unit(s) Oral daily  ferrous    sulfate 325milliGRAM(s) Oral daily  heparin  Injectable 5000Unit(s) SubCutaneous every 8 hours      Complaint: Tired    Otherwise 12 point review of systems is normal.    PHYSICAL EXAM:    Constitutional: Poor  Eyes: PERRL, EOMI, sclera non-icteric  Neck: supple, no masses, no JVD  Respiratory: CTA b/l, good air entry b/l, no wheezing, rhonchi, rales, with normal respiratory effort and no intercostal retractions  Cardiovascular: RRR, normal S1S2, no M/R/G  Gastrointestinal: soft, NTND, no masses palpable, BS normal in all four quadrants,   Extremities:  no c/c/e  Neurological: AAOx3  ICU Vital Signs Last 24 Hrs  T(C): 36.3, Max: 37.2 (01-27 @ 20:45)  T(F): 97.4, Max: 99 (01-27 @ 20:45)  HR: 78 (70 - 88)  BP: 113/69 (113/69 - 135/88)  BP(mean): --  ABP: --  ABP(mean): --  RR: 17 (14 - 17)  SpO2: 96% (95% - 97%)        ECG:      CHEST X RAY    CT    MRI    MRA    CT ANGIO    CAROTID DUPLEX    IMPRESSION:  Normal study.  DUPLEX     Echocardiogram  Left Ventricle  Normal left ventricular size and wall thickness.  The left ventricular wall motion is normal.  The left ventricular ejection fraction is normal.  The left ventricular ejection fraction is estimated to be 55-60%  Left Atrium  The left atrial size is normal.  Right Atrium  The right atrium is dilated.  Right Ventricle  The right ventricle is normal in size and function.  Aortic Valve  Structurally normal aortic valve.  No aortic regurgitation noted.  Mitral Valve  Structurally normal mitral valve.  There is mild mitral regurgitation.  Tricuspid Valve  Structurally normal tricuspid valve.  There is trace tricuspid regurgitation.  There isno echocardiographic evidence for pulmonary hypertension.  Pulmonic Valve  Structurally normal pulmonic valve.  No pulmonic regurgitation noted.  Arteries and Venous System  No aortic root dilatation.  The inferior vena cava is normal in size (<2.1 cm) with normal inspiratory  collapse (>50%) consistent with normal right atrial pressure.  Pericardium / Pleura  There is no pericardial effusion.  Additional Comments  There is a very small (2mm) m0bile echo density attached to the aortic   side  of the aortic valve. Small fibroelastoma? other small mass?  Catheterization:    Stress Test:     LABS:	 	  CARDIAC MARKERS:  Troponin I, Serum: <0.015 ng/mL [0.015 - 0.045] (01-25 @ 16:23)                              11.1   1.7   )-----------( 266      ( 27 Jan 2017 15:36 )             35.2     27 Jan 2017 15:36    141    |  108    |  8      ----------------------------<  142    4.8     |  25     |  0.62     Ca    8.7        27 Jan 2017 15:36  Mg     2.6       27 Jan 2017 15:36      proBNP: Serum Pro-Brain Natriuretic Peptide: 45 pg/mL (01-25 @ 16:23)    Lipid Profile:   HgA1c: Hemoglobin A1C, Whole Blood: 4.9 % (01-26 @ 12:40)      ASSESSMENT/PLAN: 	  Heme work up

## 2017-01-28 NOTE — PROGRESS NOTE ADULT - SUBJECTIVE AND OBJECTIVE BOX
Pt seen and examined less sob, no complaints    REVIEW OF SYSTEMS:  Constitutional: No fever, weight loss or fatigue  Cardiovascular: No chest pain, palpitations, dizziness or leg swelling  Gastrointestinal: No abdominal or epigastric pain. No nausea, vomiting or hematemesis; No diarrhea or constipation. No melena or hematochezia.  Skin: No itching, burning, rashes or lesions       MEDICATIONS:  MEDICATIONS  (STANDING):  piperacillin/tazobactam IVPB.  IV Intermittent   piperacillin/tazobactam IVPB. 4.5Gram(s) IV Intermittent every 6 hours  insulin lispro (HumaLOG) corrective regimen sliding scale  SubCutaneous Before meals and at bedtime  ALBUTerol/ipratropium for Nebulization 3milliLiter(s) Nebulizer every 4 hours  cholecalciferol 1000Unit(s) Oral daily  levETIRAcetam 1000milliGRAM(s) Oral two times a day  ferrous    sulfate 325milliGRAM(s) Oral daily  oseltamivir 75milliGRAM(s) Oral two times a day  pantoprazole    Tablet 40milliGRAM(s) Oral before breakfast  buDESOnide  180 MICROgram(s) Inhaler 1Puff(s) Inhalation daily  heparin  Injectable 5000Unit(s) SubCutaneous every 8 hours  methylPREDNISolone sodium succinate Injectable 40milliGRAM(s) IV Push every 8 hours  vancomycin  IVPB 1250milliGRAM(s) IV Intermittent every 12 hours  polyethylene glycol 3350 17Gram(s) Oral two times a day    MEDICATIONS  (PRN):  methadone 10milliGRAM(s) Oral every 6 hours PRN pain  zolpidem 5milliGRAM(s) Oral at bedtime PRN Insomnia      Allergies    dust (Unknown)  latex (Unknown)  No Known Drug Allergies  Nuts (Anaphylaxis)  peanuts (Unknown)    Intolerances        Vital Signs Last 24 Hrs  T(C): 36.3, Max: 37.2 (01-27 @ 20:45)  T(F): 97.4, Max: 99 (01-27 @ 20:45)  HR: 88 (70 - 88)  BP: 127/80 (117/69 - 135/88)  BP(mean): --  RR: 16 (14 - 16)  SpO2: 95% (95% - 97%)    I & Os for current day (as of 01-28 @ 10:17)  =============================================  IN: 350 ml / OUT: 0 ml / NET: 350 ml      PHYSICAL EXAM:    General: Well developed; well nourished; in no acute distress  HEENT: MMM, conjunctiva and sclera clear  Gastrointestinal: Soft non-tender non-distended; Normal bowel sounds; No hepatosplenomegaly  Skin: Warm and dry. No obvious rash    LABS:      CBC Full  -  ( 27 Jan 2017 15:36 )  WBC Count : 1.7 K/uL  Hemoglobin : 11.1 g/dL  Hematocrit : 35.2 %  Platelet Count - Automated : 266 K/uL  Mean Cell Volume : 86.1 fL  Mean Cell Hemoglobin : 27.1 pg  Mean Cell Hemoglobin Concentration : 31.5 g/dL  Auto Neutrophil # : x  Auto Lymphocyte # : x  Auto Monocyte # : x  Auto Eosinophil # : x  Auto Basophil # : x  Auto Neutrophil % : x  Auto Lymphocyte % : x  Auto Monocyte % : x  Auto Eosinophil % : x  Auto Basophil % : x    27 Jan 2017 15:36    141    |  108    |  8      ----------------------------<  142    4.8     |  25     |  0.62     Ca    8.7        27 Jan 2017 15:36  Mg     2.6       27 Jan 2017 15:36    TPro  5.8    /  Alb  2.6    /  TBili  0.2    /  DBili  x      /  AST  115    /  ALT  66     /  AlkPhos  56     26 Jan 2017 12:40                      RADIOLOGY & ADDITIONAL STUDIES (The following images were personally reviewed):

## 2017-01-28 NOTE — DIETITIAN INITIAL EVALUATION ADULT. - NS AS NUTRI INTERV MEALS SNACK
Specific foods/beverages or groups/recommend Ensure Plus TID (1050kcal + 39g protein) to help meet needs--d/w MD

## 2017-01-28 NOTE — DIETITIAN INITIAL EVALUATION ADULT. - PROBLEM SELECTOR PLAN 6
h/o multiple SBOs in the past and now with mild diffuse abdominal pain, no rigidity, no rebound.  -pt suffers from chronic abdominal pain and on methadone per Dr. Garay  - no BM for one week, will check abdominal Xray and keep NPO for now.  -will continue home methadone 10mg u1qzyed if no obstruction noted.  -continue home protonix

## 2017-01-28 NOTE — CHART NOTE - NSCHARTNOTEFT_GEN_A_CORE
PGY-1 Event Note    Paged to the patients bedside for pain in her right leg. Patient seen and examined at bedside. Tearful, stating that she has pain in her right leg mainly in her foot and ankle. Denies any tingling feelings in her leg, no back pain.   DP pulse in tact, foot warm, ROM decreased 2/2 pain, no erythema, swelling, or ulcers. Sensation in tact in LEs except for in the lateral aspect of her right calf superior to the lateral malleolus.   Will consider DVT, though unlikely considering patient has been on SubQ heparin. Unlikely to be osteomyelitis considering patient has not been bacteremic. No recent trauma to the area, suspect may have a psychiatric component.   Will obtain LE dopplers, and give home dose of Dilaudid.   Will continue to monitor. PGY-1 Event Note    Paged to the patients bedside for pain in her right leg. Patient seen and examined at bedside. Tearful, stating that she has pain in her right leg mainly in her foot and ankle. Denies any tingling feelings in her leg, no back pain.   DP pulse in tact, foot warm, ROM decreased 2/2 pain, no erythema, swelling, or ulcers. Sensation in tact in LEs except for in the lateral aspect of her right calf superior to the lateral malleolus.   Will consider DVT, though unlikely considering patient has been on SubQ heparin. Unlikely to be osteomyelitis considering patient has not been bacteremic. No recent trauma to the area, suspect may have a psychiatric component.   Will obtain LE dopplers, and give home dose of Dilaudid.   Will continue to monitor.    Paged to the bedside again at around 1245am, as patient states that Dilaudid did not help her pain. No changes in physical exam, though patient no longer tearful, and now reporting pain and numbness in her left leg as well.  Will start gabapentin 300mg.  Will continue to monitor. PGY-1 Event Note    Paged to the patients bedside for pain in her right leg. Patient seen and examined at bedside. Tearful, stating that she has pain in her right leg mainly in her foot and ankle. Denies any tingling feelings in her leg, no back pain.   DP pulse in tact, foot warm, ROM decreased 2/2 pain, no erythema, swelling, or ulcers. Sensation in tact in LEs except for in the lateral aspect of her right calf superior to the lateral malleolus.   Will consider DVT, though unlikely considering patient has been on SubQ heparin. Unlikely to be osteomyelitis considering patient has not been bacteremic. No recent trauma to the area, suspect may have a psychiatric component.   Will obtain LE dopplers, and give home dose of Dilaudid.   Will continue to monitor.    Paged to the bedside again at around 1245am, as patient states that Dilaudid did not help her pain. No changes in physical exam, though patient no longer tearful, and now reporting pain and numbness in her left leg as well. Patient also examined by Dr Moscoso.  Plan discussed with Dr Moscoso.   Will start gabapentin 300mg.  Will continue to monitor.

## 2017-01-28 NOTE — PROGRESS NOTE ADULT - ASSESSMENT
ASSESSMENT/PLAN61 y/o female pt c respiratory insufficiency, AH3 influenza, likely superinfected, LLL pneumonia , Multiple sclerosis, leucopenia,     1. O2 2LNC  2. Bronchodilators:  Atrovent/ albuterol q 4 – 6 hours as needed  3. Corticosteroids: prednisone taper  4. ID/Antibiotics:use oseltamivir and widespectrum at this time, attempt sputum CX  5. Cardiac/HTN: optimize hemodynamics,   6. GI: Rx/ prophylaxis c PPI/H2B  7. Heme: Rx/VT prophylaxis c SQH/SCD/AC  8. Aspiration precautions at all times  Discussed with managing team, discussed c ID indetails

## 2017-01-28 NOTE — DIETITIAN INITIAL EVALUATION ADULT. - ENERGY NEEDS
wt (1/28) 40.9kg, IBW: 52.3kg  %IBW: 78% BMI:  16.0-- IBW utilized for needs 2/2 pt< 80% of IBW; needs increased per poor intake and BMI<18.5

## 2017-01-28 NOTE — PROGRESS NOTE ADULT - SUBJECTIVE AND OBJECTIVE BOX
Interventional, Pulmonary, Critical, Chest Special Procedures.    Pt was seen and fully examined by myself.     Time spent with patient in minutes:77    Patient is a 61y old  Female who presents with a chief complaint of shortness of breath (26 Jan 2017 02:21)Events leading to this admission reviewed and discussed in details c ID.The patient cachectic, ill appearing, poorly engaging    HPI:  Pt is a 61 year old female with PMH of anemia, asthma, DVT with noncompliance with AC, MS, multiple SBO's s/p multiple abdominal surgeries and recent CVA admitted to [facility] about 4 weeks ago for 4-6weeks who presented to the ED today after being sent in from Dr. Garay's office for tachycardia, low blood pressure and complaint of cough and sob.  Pt stated she has been having a cough with yellow phlegm since Last friday along with f/c/s and congestion and myalgias.  Pt stated her sister also was sick last week with similar symptoms. She denied wheezing at home or increased use of her inhalers.  Pt also stated she was admitted at [facility] about 4 weeks ago for a stroke that she had and was told she had a brain bleed but unclear if actual brain bleed as patient stated she was also taking lovenox at home.  Since her stroke about 4 weeks ago the patient has admitted to having continued lower extremity weakness and numbness in her toes up to mid shin with difficulty walking and getting out of bed.  Pt denied back pain.  She also stated she has had multiple SBO's about 6 of them and that she hasn't had a BM in about one week and has had a diffuse cramping abdominal pain for the last few days with an episode of nausea last week, but no vomiting.  She admitted to decreased PO intake at home.  In the ED patient had a CT angio to r/o PE and vital signs were Tmax: 99.0, HR: , BP 98/61, RR 18 and sO2 of 92% room air and 98% on 2L NC. (25 Jan 2017 22:05)    REVIEW OF SYSTEMS:  Constitutional: No fever, weight loss, profound  fatigue  Eyes: No eye pain, visual disturbances, or discharge  ENMT:  No difficulty hearing, tinnitus, vertigo; No sinus or throat pain. No epistaxis, dysphagia, dysphonia, hoarseness or odynophagia  Neck: No pain, stiffness or neck swelling.  No masses or deformities  Respiratory: + cough, wheezing, chills or hemoptysis  - COPD  - ILD   - PE   - ASTHMA     - PNEUMONIA  Cardiovascular: No chest pain, dysrhythmia, palpitations, dizziness or edema   - COPD     - CAD   - CHF   - HTN  Gastrointestinal: No abdominal or epigastric pain. No nausea, vomiting or hematemesis; No diarrhea or constipation. No melena or hematochezia. No dysphagia or Icterus.          Genitourinary: No dysuria, frequency, hematuria or incontinence   - CKD/PADDY      - ESRD  Neurological: No headaches, memory loss, loss of strength, numbness or tremors      -DEMENTIA     - STROKE    - SEIZURE  Skin: No itching, burning, rashes or lesions   Lymph Nodes: No enlarged glands  Endocrine: No heat or cold intolerance; No hair loss       - DM     - THYROID DISORDER  Musculoskeletal: No joint pain or swelling; No muscle, back or extremity pain  Psychiatric: No depression, anxiety, mood swings or difficulty sleeping  Heme/Lymph: No easy bruising or bleeding gums         - ANEMIA      - CANCER   -COAGULOPATHY  Allergy and Immunologic: No hives or eczema    PAST MEDICAL & SURGICAL HISTORY:  CVA (cerebral vascular accident)  Anemia  G tube feedings  History of DVT (deep vein thrombosis)  MS (multiple sclerosis)  Small bowel obstruction: multiple, recurrent  Asthma  SBO (small bowel obstruction): multiple    FAMILY HISTORY:  No pertinent family history in first degree relatives    SOCIAL HISTORY:      - Tobacco     - ETOH    Allergies    dust (Unknown)  latex (Unknown)  No Known Drug Allergies  Nuts (Anaphylaxis)  peanuts (Unknown)    Intolerances      Vital Signs Last 24 Hrs  T(C): 36.3, Max: 37.2 (01-27 @ 20:45)  T(F): 97.4, Max: 99 (01-27 @ 20:45)  HR: 78 (75 - 88)  BP: 113/69 (113/69 - 135/88)  BP(mean): --  RR: 17 (15 - 17)  SpO2: 96% (95% - 96%)    I & Os for current day (as of 01-28 @ 16:45)  =============================================  IN: 350 ml / OUT: 0 ml / NET: 350 ml      PHYSICAL EXAM:  Un Comfortable, + distress  Eyes: PERRL, EOM intact; conjunctiva and sclera clear  Head: Normocephalic;  No Trauma  ENMT: No nasal discharge, hoarseness, +cough or hemoptysis.  Neck: Supple; non tender; no masses or deformities.    No JVD  Respiratory:  - WHEEZING  + RHONCHI  - RALES  + R base CRACKLES.  Diminished breath sounds  BILATERAL  RIGHT  LEFT  Cardiovascular: Regular rate and rhythm. S1 and S2 Normal; No murmurs, gallops or rubs     - PPM/AICD  Gastrointestinal: Soft non-tender, non-distended; Normal bowel sounds; No hepatosplenomegaly.     -PEG    -  GT   - AMARAL  Genitourinary: No costovertebral angle tenderness. No dysuria  Extremities: AROM, No clubbing, cyanosis or edema    Vascular: Peripheral pulses palpable 2+ bilaterally  Neurological: Alert and responisve to stimuli   Skin: Warm and dry. No obvious rash  Lymph Nodes: No acute cervical or supraclavicular adenopathy  Psychiatric: Cooperative and appropriate mood    DEVICES:  - DENTURES   +IV R / L     - ETUBE   -TRACH   -CTUBE  R / L      LABS:                          11.1   1.7   )-----------( 266      ( 27 Jan 2017 15:36 )             35.2     27 Jan 2017 15:36    141    |  108    |  8      ----------------------------<  142    4.8     |  25     |  0.62     Ca    8.7        27 Jan 2017 15:36  Mg     2.6       27 Jan 2017 15:36      EXAM:  CT ANGIO CHEST PE PROTOCOL IC                           PROCEDURE DATE:  01/25/2017    Quantity of Contrast in Vial in ml: 120 Contrast Used: Optiray 350  Quantity of Contrast Wasted in ml: 6       INTERPRETATION:  CT Pulmonary Angiography (CTPA) of the CHEST dated   1/25/2017 6:40 PM    INDICATION: r/o PE     TECHNIQUE: CT angiography of the pulmonary arteries was performed during   rapid bolus injection of intravenous contrast.  Post-processing including   the production of axial and coronal multiplanar reformatted images and   coronal and axial maximum intensity projections (MIPs) was performed.    PRIOR STUDY: None. In addition there is bilateral mosaic perfusion   pattern to suggest air-trapping from underlying small airway   inflammation. No consolidation or pleural collections. Linear atelectasis   and parenchymal scarring with distortion of the left major fissure focal   interrogation of the left hemidiaphragm is identified. There is a small   loculated left-sided pleuraleffusion. There is immediate adjacent   compressive atelectasis/early pneumonitis (sagittal image 38, series 8   and axial image 199, series 4).    FINDINGS:    LUNGS: There is evidence of small and large airway inflammation   characterized by bronchial wall thickening as well as scattered   tree-in-bud micronodular opacities. No pulmonary abnormalities are   evident.      MEDIASTINUM: Pulmonary arteries are of normal caliber. No  main, lobar or   segmental branch acute or chronic pulmonary embolism. No evidence of   right ventricular strain. The heart is normal in size.  No pericardial   effusion is seen. The thoracic aorta is normal in appearance. No   mediastinal, hilar or axillary lymphadenopathy is seen.    UPPER ABDOMEN, SOFT TISSUES AND BONES: Limited evaluation of the upper   abdomen demonstrates redemonstrated is an oval appearing cystic structure   demonstrating simple fluid attenuation within the subcapsular region of   segment 4A which appears slightly larger in size when compared to prior   study measuring 3.5 cm in AP dimension and 3.2 cm and prior examination   dated 2/10/2016. May be dilated tubular structure immediately anteriorly   possibly representing intrahepatic bile ductal dilatation. Surgical clips   are noted in the region of the stomach to suggest gastric sleeve and   possibly gastrojejunostomy. Osseous structures demonstrate multiple   compression fracture deformities involving L1 T8 and T9.     IMPRESSION:    1. No evidence of pulmonary artery emboli. No evidence of right   ventricular strain.  2. Findings compatible with small large airway inflammation with   bronchial wall thickening and scattered tree-in-bud micronodular pattern.   There is a mucous plugging are noted within the left lower lobe.  3. Focal consolidation/atelectasis involving the left lower lobe with a   small possible left parapneumonic effusion.  4. Linear areas of scarring and/or atelectasis in the left lower lung   zone.  5. Multiple low-attenuation intrahepatic structures possibly representing   cysts the largest of which has increased in size when compared to   2/10/2016 measuring up to 3.5 cm. There may be new and hepatic bile duct   dilatation. Further characterization of these findings with a nonemergent   hepatic ultrasound is suggested.  RADIOLOGY & ADDITIONAL STUDIES (The following images were personally reviewed):

## 2017-01-28 NOTE — DIETITIAN INITIAL EVALUATION ADULT. - PROBLEM SELECTOR PLAN 1
associated with cough, found to have tachycardic and low BP in ED, likely 2/2 PNA vs influenza, less likely PE.  -pt with CT angio showing no PE but mucous plugging, left sided infiltrate with possible parapneumonic effusion present. In setting of low BP, tachycardia and low grade temp of 99, will treat patient with vancomycin and zosyn for likely HCAP.  -check RVP to r/o influenza.  -holding off on steroids at this time as no wheezing on exam, however will start duonebs i1qrusv.   -pt without work of breathing currently, will continue 2L NC for supplemental O2 for now.  -pulmonary to see patient in AM.

## 2017-01-28 NOTE — CHART NOTE - NSCHARTNOTEFT_GEN_A_CORE
Upon Nutritional Assessment by the Registered Dietitian your patient was determined to meet criteria / has evidence of the following diagnosis/diagnoses:          [ ]  Mild Protein Calorie Malnutrition        [ ]  Moderate Protein Calorie Malnutrition        [ ] Severe Protein Calorie Malnutrition        [ ] Unspecified Protein Calorie Malnutrition        [X] Underweight / BMI <19        [ ] Morbid Obesity / BMI > 40      Findings as based on:  •  Comprehensive nutrition assessment and consultation        Treatment:  Recommend DASH/TLC diet w/ Ensure Plus TID (1050kcal, 39g protein); please see dietitian initial assessment and flow sheet notes for more details      PROVIDER Section:     By signing this assessment you are acknowledging and agree with the diagnosis/diagnoses assigned by the Registered Dietitian    Comments:

## 2017-01-28 NOTE — DIETITIAN INITIAL EVALUATION ADULT. - PROBLEM SELECTOR PLAN 3
per patient she had a brain bleed 4 weeks ago and was admitted at [facility] for 4-6 weeks. will need to obtain collateral information in AM.  -will check CT head for possible bleed. no HSQ or AC until confirmed no brain bleed present.  -lower extremity weakness and numbness is stable since admission for stroke per patient.  -neurology to see patient in the AM.  -continue home keppra 1000mg bid for seizure prophylaxis.

## 2017-01-28 NOTE — PROGRESS NOTE ADULT - ASSESSMENT
Influenza A virus infection  Bacterial pneumonia  Leukopenia    Recommend  CBC with manual diff / blood smear  Screen for HIV  Continue Oseltamivir and IV Vanco and Pip-Tazo Influenza A virus infection  Bacterial pneumonia  Leukopenia    Recommend:  CBC with manual diff / blood smear  Screen for HIV  Continue Oseltamivir and IV Vanco and Pip-Tazo for now  Acapella / Flutter valve to promote airway clearance and sputum production  If able to produce sputum, please send for culture  Check Legionella Urinary Ag

## 2017-01-28 NOTE — DIETITIAN INITIAL EVALUATION ADULT. - OTHER INFO
Pt w/ influenza and hcap; w/ low appetite, was able to consume <50% this morning, w/ no N/V/D/C, pt w/ abd pain (h/o SBO --Xray unremarkable--plan to start bowel regimen) skin intact w/ no edema.

## 2017-01-28 NOTE — PROGRESS NOTE ADULT - SUBJECTIVE AND OBJECTIVE BOX
INTERVAL HPI/OVERNIGHT EVENTS:    Reviewed  Tolerating abx well    MEDICATIONS  (STANDING):  piperacillin/tazobactam IVPB.  IV Intermittent   piperacillin/tazobactam IVPB. 4.5Gram(s) IV Intermittent every 6 hours  insulin lispro (HumaLOG) corrective regimen sliding scale  SubCutaneous Before meals and at bedtime  ALBUTerol/ipratropium for Nebulization 3milliLiter(s) Nebulizer every 4 hours  cholecalciferol 1000Unit(s) Oral daily  levETIRAcetam 1000milliGRAM(s) Oral two times a day  ferrous    sulfate 325milliGRAM(s) Oral daily  oseltamivir 75milliGRAM(s) Oral two times a day  pantoprazole    Tablet 40milliGRAM(s) Oral before breakfast  buDESOnide  180 MICROgram(s) Inhaler 1Puff(s) Inhalation daily  heparin  Injectable 5000Unit(s) SubCutaneous every 8 hours  methylPREDNISolone sodium succinate Injectable 40milliGRAM(s) IV Push every 8 hours  vancomycin  IVPB 1250milliGRAM(s) IV Intermittent every 12 hours  polyethylene glycol 3350 17Gram(s) Oral two times a day    MEDICATIONS  (PRN):  methadone 10milliGRAM(s) Oral every 6 hours PRN pain  zolpidem 5milliGRAM(s) Oral at bedtime PRN Insomnia      Allergies    dust (Unknown)  latex (Unknown)  No Known Drug Allergies  Nuts (Anaphylaxis)  peanuts (Unknown)    EXAM    Vital Signs Last 24 Hrs  T(C): 36.3, Max: 37.2 (01-27 @ 20:45)  T(F): 97.4, Max: 99 (01-27 @ 20:45)  HR: 78 (75 - 88)  BP: 113/69 (113/69 - 135/88)  BP(mean): --  RR: 17 (15 - 17)  SpO2: 96% (95% - 96%)  No distress  Awake and alert  Frail  No rash  Poor insp efforrt  Abd soft NT    LABS:                        11.1   1.7   )-----------( 266      ( 27 Jan 2017 15:36 )             35.2     27 Jan 2017 15:36    141    |  108    |  8      ----------------------------<  142    4.8     |  25     |  0.62     Ca    8.7        27 Jan 2017 15:36  Mg     2.6       27 Jan 2017 15:36    MICROBIOLOGY:    Culture - Blood (01.28.17 @ 03:31)    Specimen Source: .Blood None    Culture Results:   No growth at 12 hours    Culture - Blood (01.28.17 @ 03:31)    Specimen Source: .Blood None    Culture Results:   No growth at 12 hours

## 2017-01-28 NOTE — DIETITIAN INITIAL EVALUATION ADULT. - PROBLEM SELECTOR PLAN 4
pt currently not wheezing on exam and hadn't received steroids or nebulizers prior.  -however ED ordered patient for solumedrol 125mg, pt does not appear to be in asthma exacerbation, will start duonebs v8txiin, but no need to continue steroids at this time.  -pulm to evaluate patient in the AM.

## 2017-01-28 NOTE — DIETITIAN INITIAL EVALUATION ADULT. - PROBLEM SELECTOR PLAN 2
per patient h/o DVT in her left leg and previously on xarelto and lovenox.  -per Dr. Garay concern that patient may have been taking both xarelto and lovenox at home, will hold AC for now and check CT head prior to restarting AC as patient stated she had a brain bleed 4 weeks ago.  -coags currently normal, will monitor patient for signs of bleeding.

## 2017-01-28 NOTE — PROVIDER CONTACT NOTE (OTHER) - SITUATION
Patient with possible aspiration coughing up copious sputum. O2 sat 89.
Patient complains of increasing RLE pain

## 2017-01-28 NOTE — PROGRESS NOTE ADULT - SUBJECTIVE AND OBJECTIVE BOX
· Subjective and Objective: 	  HPI:  Pt is a 61 year old female with PMH of anemia, asthma, DVT with noncompliance with AC, MS, multiple SBO's s/p multiple abdominal surgeries and recent CVA admitted to [facility] about 4 weeks ago for 4-6weeks who presented to the ED today after being sent in from Dr. Garay's office for tachycardia, low blood pressure and complaint of cough and sob.  Pt stated she has been having a cough with yellow phlegm since Last friday along with f/c/s and congestion and myalgias.  Pt stated her sister also was sick last week with similar symptoms. She denied wheezing at home or increased use of her inhalers.  Pt also stated she was admitted at [facility] about 4 weeks ago for a stroke that she had and was told she had a brain bleed but unclear if actual brain bleed as patient stated she was also taking lovenox at home.  Since her stroke about 4 weeks ago the patient has admitted to having continued lower extremity weakness and numbness in her toes up to mid shin with difficulty walking and getting out of bed.  Pt denied back pain.  She also stated she has had multiple SBO's about 6 of them and that she hasn't had a BM in about one week and has had a diffuse cramping abdominal pain for the last few days with an episode of nausea last week, but no vomiting.  She admitted to decreased PO intake at home.  In the ED patient had a CT angio to r/o PE and vital signs were Tmax: 99.0, HR: , BP 98/61, RR 18 and sO2 of 92% room air and 98% on 2L NC. (25 Jan 2017 22:05)    Dxed with influenza and pneumonia  Subjectively better now  States admitted to [facility] recently with infection  No s/s of bleeding noted,    ROS otherwise neg    PAST MEDICAL & SURGICAL HISTORY:  CVA (cerebral vascular accident)  Anemia  G tube feedings  History of DVT (deep vein thrombosis)  MS (multiple sclerosis)  Small bowel obstruction: multiple, recurrent  Asthma  SBO (small bowel obstruction): multiple      MEDICATIONS  (STANDING):  piperacillin/tazobactam IVPB.  IV Intermittent   piperacillin/tazobactam IVPB. 4.5Gram(s) IV Intermittent every 6 hours  insulin lispro (HumaLOG) corrective regimen sliding scale  SubCutaneous Before meals and at bedtime  ALBUTerol/ipratropium for Nebulization 3milliLiter(s) Nebulizer every 4 hours  cholecalciferol 1000Unit(s) Oral daily  levETIRAcetam 1000milliGRAM(s) Oral two times a day  ferrous    sulfate 325milliGRAM(s) Oral daily  oseltamivir 75milliGRAM(s) Oral two times a day  pantoprazole    Tablet 40milliGRAM(s) Oral before breakfast  buDESOnide  180 MICROgram(s) Inhaler 1Puff(s) Inhalation daily  heparin  Injectable 5000Unit(s) SubCutaneous every 8 hours  methylPREDNISolone sodium succinate Injectable 40milliGRAM(s) IV Push every 8 hours  vancomycin  IVPB 1250milliGRAM(s) IV Intermittent every 12 hours  polyethylene glycol 3350 17Gram(s) Oral two times a day    MEDICATIONS  (PRN):  methadone 10milliGRAM(s) Oral every 6 hours PRN pain      Allergies    dust (Unknown)  latex (Unknown)  No Known Drug Allergies  Nuts (Anaphylaxis)  peanuts (Unknown)    SOCIAL HISTORY:  Lives at home  On Methadone    FAMILY HISTORY:  No pertinent family history in first degree relatives    EXAM  Vital Signs Last 24 Hrs  T(C): 35.6, Max: 36.6 (01-27 @ 05:23)  T(F): 96.1, Max: 97.9 (01-27 @ 05:23)  HR: 70 (70 - 83)  BP: 117/69 (117/69 - 131/76)  BP(mean): --  RR: 16 (14 - 17)  SpO2: 95% (95% - 98%)  Thin and frail  Awake and alert but slow speech and troubles with recall  Follows commands  Oral mucosa without thrush  RRR  Chest with poor insp effort  Abd soft ND NT  LE no edema    LABS:                        11.1   1.7   )-----------( 266      ( 27 Jan 2017 15:36 )             35.2     27 Jan 2017 15:36    141    |  108    |  8      ----------------------------<  142    4.8     |  25     |  0.62     Ca    8.7        27 Jan 2017 15:36  Mg     2.6       27 Jan 2017 15:36    TPro  5.8    /  Alb  2.6    /  TBili  0.2    /  DBili  x      /  AST  115    /  ALT  66     /  AlkPhos  56     26 Jan 2017 12:40    RVP positive for Influenza A3    EXAM:  CT ANGIO CHEST PE PROTOCOL IC                           PROCEDURE DATE:  01/25/2017    Quantity of Contrast in Vial in ml: 120 Contrast Used: Optiray 350  Quantity of Contrast Wasted in ml: 6       INTERPRETATION:  CT Pulmonary Angiography (CTPA) of the CHEST dated   1/25/2017 6:40 PM    INDICATION: r/o PE     TECHNIQUE: CT angiography of the pulmonary arteries was performed during   rapid bolus injection of intravenous contrast.  Post-processing including   the production of axial and coronal multiplanar reformatted images and   coronal and axial maximum intensity projections (MIPs) was performed.    PRIOR STUDY: None. In addition there is bilateral mosaic perfusion   pattern to suggest air-trapping from underlying small airway   inflammation. No consolidation or pleural collections. Linear atelectasis   and parenchymal scarring with distortion of the left major fissure focal   interrogation of the left hemidiaphragm is identified. There is a small   loculated left-sided pleuraleffusion. There is immediate adjacent   compressive atelectasis/early pneumonitis (sagittal image 38, series 8   and axial image 199, series 4).    FINDINGS:    LUNGS: There is evidence of small and large airway inflammation   characterized by bronchial wall thickening as well as scattered   tree-in-bud micronodular opacities. No pulmonary abnormalities are   evident.      MEDIASTINUM: Pulmonary arteries are of normal caliber. No  main, lobar or   segmental branch acute or chronic pulmonary embolism. No evidence of   right ventricular strain. The heart is normal in size.  No pericardial   effusion is seen. The thoracic aorta is normal in appearance. No   mediastinal, hilar or axillary lymphadenopathy is seen.    UPPER ABDOMEN, SOFT TISSUES AND BONES: Limited evaluation of the upper   abdomen demonstrates redemonstrated is an oval appearing cystic structure   demonstrating simple fluid attenuation within the subcapsular region of   segment 4A which appears slightly larger in size when compared to prior   study measuring 3.5 cm in AP dimension and 3.2 cm and prior examination   dated 2/10/2016. May be dilated tubular structure immediately anteriorly   possibly representing intrahepatic bile ductal dilatation. Surgical clips   are noted in the region of the stomach to suggest gastric sleeve and   possibly gastrojejunostomy. Osseous structures demonstrate multiple   compression fracture deformities involving L1 T8 and T9.     IMPRESSION:    1. No evidence of pulmonary artery emboli. No evidence of right   ventricular strain.  2. Findings compatible with small large airway inflammation with   bronchial wall thickening and scattered tree-in-bud micronodular pattern.   There is a mucous plugging are noted within the left lower lobe.  3. Focal consolidation/atelectasis involving the left lower lobe with a   small possible left parapneumonic effusion.  4. Linear areas of scarring and/or atelectasis in the left lower lung   zone.  5. Multiple low-attenuation intrahepatic structures possibly representing   cysts the largest of which has increased in size when compared to   2/10/2016 measuring up to 3.5 cm. There may be new and hepatic bile duct   dilatation. Further characterization of these findings with a nonemergent   hepatic ultrasound is suggested.        Assessment and Recommendation:   · Assessment		  Leukopenia/Neutropenia/Anemia  Acute Influenza A virus infection  Consolidative/lobar pneumonia  Chronic airway disease    RECOMMEND  Continue Antimicrobials as per ID  Screen for HIV / Hepatitis panel  Manual diff/blood smear  Check Iron panel/SPEP/ IPEP/ImmunofixationFlow cytometry  May require bone marrow biopsy       Attending Attestation:   I have seen and examined the patient in person.

## 2017-01-29 DIAGNOSIS — J45.909 UNSPECIFIED ASTHMA, UNCOMPLICATED: ICD-10-CM

## 2017-01-29 LAB
ANION GAP SERPL CALC-SCNC: 8 MMOL/L — LOW (ref 9–16)
ANISOCYTOSIS BLD QL: SLIGHT — SIGNIFICANT CHANGE UP
APTT BLD: 28.8 SEC — SIGNIFICANT CHANGE UP (ref 27.5–37.4)
BUN SERPL-MCNC: 11 MG/DL — SIGNIFICANT CHANGE UP (ref 7–23)
CALCIUM SERPL-MCNC: 7.7 MG/DL — LOW (ref 8.5–10.5)
CHLORIDE SERPL-SCNC: 109 MMOL/L — HIGH (ref 96–108)
CO2 SERPL-SCNC: 23 MMOL/L — SIGNIFICANT CHANGE UP (ref 22–31)
CREAT SERPL-MCNC: 0.49 MG/DL — LOW (ref 0.5–1.3)
GIANT PLATELETS BLD QL SMEAR: PRESENT — SIGNIFICANT CHANGE UP
GLUCOSE SERPL-MCNC: 128 MG/DL — HIGH (ref 70–99)
HCT VFR BLD CALC: 30.9 % — LOW (ref 34.5–45)
HCT VFR BLD CALC: 35.2 % — SIGNIFICANT CHANGE UP (ref 34.5–45)
HGB BLD-MCNC: 11.1 G/DL — LOW (ref 11.5–15.5)
HGB BLD-MCNC: 9.6 G/DL — LOW (ref 11.5–15.5)
INR BLD: 0.96 — SIGNIFICANT CHANGE UP (ref 0.88–1.16)
LEGIONELLA AG UR QL: NEGATIVE — SIGNIFICANT CHANGE UP
LG PLATELETS BLD QL AUTO: PRESENT — SIGNIFICANT CHANGE UP
LYMPHOCYTES # BLD AUTO: 23 % — SIGNIFICANT CHANGE UP (ref 13–44)
MAGNESIUM SERPL-MCNC: 2.4 MG/DL — SIGNIFICANT CHANGE UP (ref 1.6–2.4)
MCHC RBC-ENTMCNC: 26.6 PG — LOW (ref 27–34)
MCHC RBC-ENTMCNC: 26.6 PG — LOW (ref 27–34)
MCHC RBC-ENTMCNC: 31.1 G/DL — LOW (ref 32–36)
MCHC RBC-ENTMCNC: 31.5 G/DL — LOW (ref 32–36)
MCV RBC AUTO: 84.2 FL — SIGNIFICANT CHANGE UP (ref 80–100)
MCV RBC AUTO: 85.6 FL — SIGNIFICANT CHANGE UP (ref 80–100)
MONOCYTES NFR BLD AUTO: 8 % — SIGNIFICANT CHANGE UP (ref 2–14)
NEUTROPHILS NFR BLD AUTO: 65 % — SIGNIFICANT CHANGE UP (ref 43–77)
NEUTS BAND # BLD: 4 % — SIGNIFICANT CHANGE UP
OVALOCYTES BLD QL SMEAR: SIGNIFICANT CHANGE UP
PLAT MORPH BLD: (no result)
PLATELET # BLD AUTO: 319 K/UL — SIGNIFICANT CHANGE UP (ref 150–400)
PLATELET # BLD AUTO: 327 K/UL — SIGNIFICANT CHANGE UP (ref 150–400)
PLATELET CLUMP BLD QL SMEAR: PRESENT
POLYCHROMASIA BLD QL SMEAR: SLIGHT — SIGNIFICANT CHANGE UP
POTASSIUM SERPL-MCNC: 4.6 MMOL/L — SIGNIFICANT CHANGE UP (ref 3.5–5.3)
POTASSIUM SERPL-SCNC: 4.6 MMOL/L — SIGNIFICANT CHANGE UP (ref 3.5–5.3)
PROTHROM AB SERPL-ACNC: 10.6 SEC — SIGNIFICANT CHANGE UP (ref 10–13.1)
RBC # BLD: 3.61 M/UL — LOW (ref 3.8–5.2)
RBC # BLD: 4.18 M/UL — SIGNIFICANT CHANGE UP (ref 3.8–5.2)
RBC # FLD: 18.3 % — HIGH (ref 10.3–16.9)
RBC # FLD: 18.6 % — HIGH (ref 10.3–16.9)
RBC BLD AUTO: (no result)
SODIUM SERPL-SCNC: 140 MMOL/L — SIGNIFICANT CHANGE UP (ref 135–145)
VANCOMYCIN TROUGH SERPL-MCNC: 46.8 UG/ML — CRITICAL HIGH (ref 10–20)
WBC # BLD: 3.5 K/UL — LOW (ref 3.8–10.5)
WBC # BLD: 3.5 K/UL — LOW (ref 3.8–10.5)
WBC # FLD AUTO: 3.5 K/UL — LOW (ref 3.8–10.5)
WBC # FLD AUTO: 3.5 K/UL — LOW (ref 3.8–10.5)

## 2017-01-29 PROCEDURE — 93970 EXTREMITY STUDY: CPT | Mod: 26

## 2017-01-29 RX ORDER — GABAPENTIN 400 MG/1
300 CAPSULE ORAL DAILY
Qty: 0 | Refills: 0 | Status: DISCONTINUED | OUTPATIENT
Start: 2017-01-29 | End: 2017-02-03

## 2017-01-29 RX ORDER — HYDROMORPHONE HYDROCHLORIDE 2 MG/ML
0.5 INJECTION INTRAMUSCULAR; INTRAVENOUS; SUBCUTANEOUS EVERY 4 HOURS
Qty: 0 | Refills: 0 | Status: DISCONTINUED | OUTPATIENT
Start: 2017-01-29 | End: 2017-01-30

## 2017-01-29 RX ORDER — ENOXAPARIN SODIUM 100 MG/ML
40 INJECTION SUBCUTANEOUS EVERY 12 HOURS
Qty: 0 | Refills: 0 | Status: DISCONTINUED | OUTPATIENT
Start: 2017-01-29 | End: 2017-02-03

## 2017-01-29 RX ADMIN — ENOXAPARIN SODIUM 40 MILLIGRAM(S): 100 INJECTION SUBCUTANEOUS at 19:02

## 2017-01-29 RX ADMIN — HYDROMORPHONE HYDROCHLORIDE 0.5 MILLIGRAM(S): 2 INJECTION INTRAMUSCULAR; INTRAVENOUS; SUBCUTANEOUS at 22:54

## 2017-01-29 RX ADMIN — HYDROMORPHONE HYDROCHLORIDE 0.5 MILLIGRAM(S): 2 INJECTION INTRAMUSCULAR; INTRAVENOUS; SUBCUTANEOUS at 23:15

## 2017-01-29 RX ADMIN — PIPERACILLIN AND TAZOBACTAM 200 GRAM(S): 4; .5 INJECTION, POWDER, LYOPHILIZED, FOR SOLUTION INTRAVENOUS at 17:36

## 2017-01-29 RX ADMIN — HEPARIN SODIUM 5000 UNIT(S): 5000 INJECTION INTRAVENOUS; SUBCUTANEOUS at 14:57

## 2017-01-29 RX ADMIN — POLYETHYLENE GLYCOL 3350 17 GRAM(S): 17 POWDER, FOR SOLUTION ORAL at 06:48

## 2017-01-29 RX ADMIN — POLYETHYLENE GLYCOL 3350 17 GRAM(S): 17 POWDER, FOR SOLUTION ORAL at 17:35

## 2017-01-29 RX ADMIN — Medication 75 MILLIGRAM(S): at 06:47

## 2017-01-29 RX ADMIN — PIPERACILLIN AND TAZOBACTAM 200 GRAM(S): 4; .5 INJECTION, POWDER, LYOPHILIZED, FOR SOLUTION INTRAVENOUS at 06:48

## 2017-01-29 RX ADMIN — GABAPENTIN 300 MILLIGRAM(S): 400 CAPSULE ORAL at 01:37

## 2017-01-29 RX ADMIN — PIPERACILLIN AND TAZOBACTAM 200 GRAM(S): 4; .5 INJECTION, POWDER, LYOPHILIZED, FOR SOLUTION INTRAVENOUS at 23:40

## 2017-01-29 RX ADMIN — HYDROMORPHONE HYDROCHLORIDE 0.5 MILLIGRAM(S): 2 INJECTION INTRAMUSCULAR; INTRAVENOUS; SUBCUTANEOUS at 17:39

## 2017-01-29 RX ADMIN — PIPERACILLIN AND TAZOBACTAM 200 GRAM(S): 4; .5 INJECTION, POWDER, LYOPHILIZED, FOR SOLUTION INTRAVENOUS at 11:55

## 2017-01-29 RX ADMIN — Medication 3 MILLILITER(S): at 14:56

## 2017-01-29 RX ADMIN — Medication 1000 UNIT(S): at 11:55

## 2017-01-29 RX ADMIN — LEVETIRACETAM 1000 MILLIGRAM(S): 250 TABLET, FILM COATED ORAL at 06:46

## 2017-01-29 RX ADMIN — PANTOPRAZOLE SODIUM 40 MILLIGRAM(S): 20 TABLET, DELAYED RELEASE ORAL at 06:46

## 2017-01-29 RX ADMIN — Medication 166.67 MILLIGRAM(S): at 04:15

## 2017-01-29 RX ADMIN — HYDROMORPHONE HYDROCHLORIDE 4 MILLIGRAM(S): 2 INJECTION INTRAMUSCULAR; INTRAVENOUS; SUBCUTANEOUS at 00:40

## 2017-01-29 RX ADMIN — Medication 40 MILLIGRAM(S): at 06:47

## 2017-01-29 RX ADMIN — Medication 100 MILLIGRAM(S): at 10:40

## 2017-01-29 RX ADMIN — Medication 75 MILLIGRAM(S): at 17:35

## 2017-01-29 RX ADMIN — HEPARIN SODIUM 5000 UNIT(S): 5000 INJECTION INTRAVENOUS; SUBCUTANEOUS at 06:46

## 2017-01-29 RX ADMIN — ZOLPIDEM TARTRATE 5 MILLIGRAM(S): 10 TABLET ORAL at 01:37

## 2017-01-29 RX ADMIN — Medication 40 MILLIGRAM(S): at 22:37

## 2017-01-29 RX ADMIN — Medication 3 MILLILITER(S): at 22:37

## 2017-01-29 RX ADMIN — LEVETIRACETAM 1000 MILLIGRAM(S): 250 TABLET, FILM COATED ORAL at 17:35

## 2017-01-29 RX ADMIN — HYDROMORPHONE HYDROCHLORIDE 0.5 MILLIGRAM(S): 2 INJECTION INTRAMUSCULAR; INTRAVENOUS; SUBCUTANEOUS at 16:52

## 2017-01-29 RX ADMIN — Medication 1 PUFF(S): at 11:55

## 2017-01-29 RX ADMIN — HYDROMORPHONE HYDROCHLORIDE 4 MILLIGRAM(S): 2 INJECTION INTRAMUSCULAR; INTRAVENOUS; SUBCUTANEOUS at 00:04

## 2017-01-29 RX ADMIN — METHADONE HYDROCHLORIDE 10 MILLIGRAM(S): 40 TABLET ORAL at 06:47

## 2017-01-29 RX ADMIN — Medication 3 MILLILITER(S): at 00:05

## 2017-01-29 RX ADMIN — Medication 3 MILLILITER(S): at 09:50

## 2017-01-29 RX ADMIN — PIPERACILLIN AND TAZOBACTAM 200 GRAM(S): 4; .5 INJECTION, POWDER, LYOPHILIZED, FOR SOLUTION INTRAVENOUS at 00:05

## 2017-01-29 RX ADMIN — METHADONE HYDROCHLORIDE 10 MILLIGRAM(S): 40 TABLET ORAL at 14:56

## 2017-01-29 RX ADMIN — Medication 40 MILLIGRAM(S): at 14:57

## 2017-01-29 RX ADMIN — Medication 3 MILLILITER(S): at 17:04

## 2017-01-29 RX ADMIN — Medication 325 MILLIGRAM(S): at 11:55

## 2017-01-29 RX ADMIN — Medication 3 MILLILITER(S): at 06:49

## 2017-01-29 RX ADMIN — GABAPENTIN 300 MILLIGRAM(S): 400 CAPSULE ORAL at 11:55

## 2017-01-29 NOTE — PROGRESS NOTE ADULT - SUBJECTIVE AND OBJECTIVE BOX
Complains of generalized pain.   ICU Vital Signs Last 24 Hrs  T(C): 36.7, Max: 36.8 (01-28 @ 20:46)  T(F): 98, Max: 98.2 (01-28 @ 20:46)  HR: 100 (80 - 103)  BP: 120/77 (120/77 - 141/79)  BP(mean): --  ABP: --  ABP(mean): --  RR: 22 (17 - 22)  SpO2: 96% (96% - 100%)    PHYSICAL EXAM:  Constitutional: WDWN, moderate distress due to pain  HEENT: sclera non-icteric, right scleral hemorrhage, no conjunctival pallor, dry MM  Respiratory: Right sided rhonchi, mild expiratory wheezing on the left side  Cardiovascular: RRR, normal s1/s2, no MRG  Gastrointestinal: soft, ND, +BS, diffuse abdominal tenderness, voluntary guarding, no organomegaly  Extremities: WWP, DP/radial pulses 2+ b/l, no edema  Neurological: AAOx 3, responds to commands, moves all extremities, CN 2-12 intact  Musculoskeletal: 5/5 in UE b/l, 4/5 in LE b/l    MEDICATIONS  (STANDING):  piperacillin/tazobactam IVPB.  IV Intermittent   piperacillin/tazobactam IVPB. 4.5Gram(s) IV Intermittent every 6 hours  insulin lispro (HumaLOG) corrective regimen sliding scale  SubCutaneous Before meals and at bedtime  ALBUTerol/ipratropium for Nebulization 3milliLiter(s) Nebulizer every 4 hours  cholecalciferol 1000Unit(s) Oral daily  levETIRAcetam 1000milliGRAM(s) Oral two times a day  ferrous    sulfate 325milliGRAM(s) Oral daily  oseltamivir 75milliGRAM(s) Oral two times a day  pantoprazole    Tablet 40milliGRAM(s) Oral before breakfast  buDESOnide  180 MICROgram(s) Inhaler 1Puff(s) Inhalation daily  heparin  Injectable 5000Unit(s) SubCutaneous every 8 hours  methylPREDNISolone sodium succinate Injectable 40milliGRAM(s) IV Push every 8 hours  polyethylene glycol 3350 17Gram(s) Oral two times a day  gabapentin 300milliGRAM(s) Oral daily    MEDICATIONS  (PRN):  methadone 10milliGRAM(s) Oral every 6 hours PRN pain  zolpidem 5milliGRAM(s) Oral at bedtime PRN Insomnia  LORazepam     Tablet 0.5milliGRAM(s) Oral every 8 hours PRN Anxiety    ALLERGIES: dust (Unknown)  latex (Unknown)  No Known Drug Allergies  Nuts (Anaphylaxis)  peanuts (Unknown)    LABS: ( 29 Jan 2017 06:14 )                        9.6    3.5   )-----------( 327                   30.9     140    |  109    |  11     ----------------------------<  128    4.6     |  23     |  0.49     Ca    7.7        Mg     2.4

## 2017-01-29 NOTE — CONSULT NOTE ADULT - PROBLEM SELECTOR RECOMMENDATION 9
? etiology  when SOB better consider GI work up
Pt with increased work of breathing this am, given duonebs and solumedrol. Now on Solumedrol Q8, Duoneb's Q4. SOB and increased work of breathing likely 2/2 asthma exacerbation in the setting of post viral pneumonia. Currently the pt appears comfortable, no signs of respiratory distress. Saturating 97% on nasal cannula. CT with LLL infiltrate and bronchiectatic changes. Would continue current management. Would check peak flow.   Pt stable for the regional medical floor.

## 2017-01-29 NOTE — PROGRESS NOTE ADULT - SUBJECTIVE AND OBJECTIVE BOX
· Subjective and Objective: 	  HPI:  Pt is a 61 year old female with PMH of anemia, asthma, DVT with noncompliance with AC, MS, multiple SBO's s/p multiple abdominal surgeries and recent CVA admitted to [facility] about 4 weeks ago for 4-6weeks who presented to the ED today after being sent in from Dr. Garay's office for tachycardia, low blood pressure and complaint of cough and sob.  Pt stated she has been having a cough with yellow phlegm since Last friday along with f/c/s and congestion and myalgias.  Pt stated her sister also was sick last week with similar symptoms. She denied wheezing at home or increased use of her inhalers.  Pt also stated she was admitted at [facility] about 4 weeks ago for a stroke that she had and was told she had a brain bleed but unclear if actual brain bleed as patient stated she was also taking lovenox at home.  Since her stroke about 4 weeks ago the patient has admitted to having continued lower extremity weakness and numbness in her toes up to mid shin with difficulty walking and getting out of bed.  Pt denied back pain.  She also stated she has had multiple SBO's about 6 of them and that she hasn't had a BM in about one week and has had a diffuse cramping abdominal pain for the last few days with an episode of nausea last week, but no vomiting.  She admitted to decreased PO intake at home.  In the ED patient had a CT angio to r/o PE and vital signs were Tmax: 99.0, HR: , BP 98/61, RR 18 and sO2 of 92% room air and 98% on 2L NC. (25 Jan 2017 22:05).  No s/s of bleeding noted.    Dxed with influenza and pneumonia  Subjectively better now  States admitted to [facility] recently with infection  No s/s of bleeding noted,    ROS otherwise neg    PAST MEDICAL & SURGICAL HISTORY:  CVA (cerebral vascular accident)  Anemia  G tube feedings  History of DVT (deep vein thrombosis)  MS (multiple sclerosis)  Small bowel obstruction: multiple, recurrent  Asthma  SBO (small bowel obstruction): multiple      MEDICATIONS  (STANDING):  piperacillin/tazobactam IVPB.  IV Intermittent   piperacillin/tazobactam IVPB. 4.5Gram(s) IV Intermittent every 6 hours  insulin lispro (HumaLOG) corrective regimen sliding scale  SubCutaneous Before meals and at bedtime  ALBUTerol/ipratropium for Nebulization 3milliLiter(s) Nebulizer every 4 hours  cholecalciferol 1000Unit(s) Oral daily  levETIRAcetam 1000milliGRAM(s) Oral two times a day  ferrous    sulfate 325milliGRAM(s) Oral daily  oseltamivir 75milliGRAM(s) Oral two times a day  pantoprazole    Tablet 40milliGRAM(s) Oral before breakfast  buDESOnide  180 MICROgram(s) Inhaler 1Puff(s) Inhalation daily  heparin  Injectable 5000Unit(s) SubCutaneous every 8 hours  methylPREDNISolone sodium succinate Injectable 40milliGRAM(s) IV Push every 8 hours  vancomycin  IVPB 1250milliGRAM(s) IV Intermittent every 12 hours  polyethylene glycol 3350 17Gram(s) Oral two times a day    MEDICATIONS  (PRN):  methadone 10milliGRAM(s) Oral every 6 hours PRN pain      Allergies    dust (Unknown)  latex (Unknown)  No Known Drug Allergies  Nuts (Anaphylaxis)  peanuts (Unknown)    SOCIAL HISTORY:  Lives at home  On Methadone    FAMILY HISTORY:  No pertinent family history in first degree relatives    EXAM  Vital Signs Last 24 Hrs  T(C): 36.6, Max: 36.8 (01-28 @ 20:46)  T(F): 97.8, Max: 98.2 (01-28 @ 20:46)  HR: 100 (82 - 103)  BP: 121/75 (120/77 - 127/78)  BP(mean): --  RR: 20 (17 - 22)  SpO2: 100% (96% - 100%)    Thin and frail  Awake and alert but slow speech and troubles with recall  Follows commands  Oral mucosa without thrush  RRR  Chest with poor insp effort  Abd soft ND NT  LE no edema    LABS:  Vital Signs Last 24 Hrs  T(C): 36.6, Max: 36.8 (01-28 @ 20:46)  T(F): 97.8, Max: 98.2 (01-28 @ 20:46)  HR: 100 (82 - 103)  BP: 121/75 (120/77 - 127/78)  BP(mean): --  RR: 20 (17 - 22)  SpO2: 100% (96% - 100%)                        11.1   3.5   )-----------( 319      ( 29 Jan 2017 17:42 )             35.2    29 Jan 2017 06:14    140    |  109    |  11     ----------------------------<  128    4.6     |  23     |  0.49     Ca    7.7        29 Jan 2017 06:14  Mg     2.4       29 Jan 2017 06:14          RVP positive for Influenza A3    EXAM:  CT ANGIO CHEST PE PROTOCOL IC                           PROCEDURE DATE:  01/25/2017    Quantity of Contrast in Vial in ml: 120 Contrast Used: Optiray 350  Quantity of Contrast Wasted in ml: 6       INTERPRETATION:  CT Pulmonary Angiography (CTPA) of the CHEST dated   1/25/2017 6:40 PM    INDICATION: r/o PE     TECHNIQUE: CT angiography of the pulmonary arteries was performed during   rapid bolus injection of intravenous contrast.  Post-processing including   the production of axial and coronal multiplanar reformatted images and   coronal and axial maximum intensity projections (MIPs) was performed.    PRIOR STUDY: None. In addition there is bilateral mosaic perfusion   pattern to suggest air-trapping from underlying small airway   inflammation. No consolidation or pleural collections. Linear atelectasis   and parenchymal scarring with distortion of the left major fissure focal   interrogation of the left hemidiaphragm is identified. There is a small   loculated left-sided pleuraleffusion. There is immediate adjacent   compressive atelectasis/early pneumonitis (sagittal image 38, series 8   and axial image 199, series 4).    FINDINGS:    LUNGS: There is evidence of small and large airway inflammation   characterized by bronchial wall thickening as well as scattered   tree-in-bud micronodular opacities. No pulmonary abnormalities are   evident.      MEDIASTINUM: Pulmonary arteries are of normal caliber. No  main, lobar or   segmental branch acute or chronic pulmonary embolism. No evidence of   right ventricular strain. The heart is normal in size.  No pericardial   effusion is seen. The thoracic aorta is normal in appearance. No   mediastinal, hilar or axillary lymphadenopathy is seen.    UPPER ABDOMEN, SOFT TISSUES AND BONES: Limited evaluation of the upper   abdomen demonstrates redemonstrated is an oval appearing cystic structure   demonstrating simple fluid attenuation within the subcapsular region of   segment 4A which appears slightly larger in size when compared to prior   study measuring 3.5 cm in AP dimension and 3.2 cm and prior examination   dated 2/10/2016. May be dilated tubular structure immediately anteriorly   possibly representing intrahepatic bile ductal dilatation. Surgical clips   are noted in the region of the stomach to suggest gastric sleeve and   possibly gastrojejunostomy. Osseous structures demonstrate multiple   compression fracture deformities involving L1 T8 and T9.     IMPRESSION:    1. No evidence of pulmonary artery emboli. No evidence of right   ventricular strain.  2. Findings compatible with small large airway inflammation with   bronchial wall thickening and scattered tree-in-bud micronodular pattern.   There is a mucous plugging are noted within the left lower lobe.  3. Focal consolidation/atelectasis involving the left lower lobe with a   small possible left parapneumonic effusion.  4. Linear areas of scarring and/or atelectasis in the left lower lung   zone.  5. Multiple low-attenuation intrahepatic structures possibly representing   cysts the largest of which has increased in size when compared to   2/10/2016 measuring up to 3.5 cm. There may be new and hepatic bile duct   dilatation. Further characterization of these findings with a nonemergent   hepatic ultrasound is suggested.        Assessment and Recommendation:   · Assessment		  Leukopenia/Neutropenia/Anemia  Acute Influenza A virus infection  Consolidative/lobar pneumonia  Chronic airway disease    RECOMMEND  Continue Antimicrobials as per ID  Screen for HIV / Hepatitis panel  Manual diff/blood smear  Check Iron panel/SPEP/ IPEP/ImmunofixationFlow cytometry  May require bone marrow biopsy       Attending Attestation:   I have seen and examined the patient in person.

## 2017-01-29 NOTE — PHYSICAL THERAPY INITIAL EVALUATION ADULT - GENERAL OBSERVATIONS, REHAB EVAL
Patient received supine in bed in NAD, +bed alarm, +non-rebreather mask. Patient received supine in bed in NAD, +bed alarm, +non-rebreather secondary to feeling uncomfortable with high flow NC. Patient cleared for PT by Dr. Estrada.

## 2017-01-29 NOTE — PROGRESS NOTE ADULT - SUBJECTIVE AND OBJECTIVE BOX
Interventional, Pulmonary, Critical, Chest Special Procedures.    Pt was seen and fully examined by myself.     Time spent with patient in minutes:75    Patient is a 61y old  Female who presents with a chief complaint of shortness of breath (26 Jan 2017 02:21)The patient ill appearing, increased work of breathing, non productive cough. Pain free    HPI:  Pt is a 61 year old female with PMH of anemia, asthma, DVT with noncompliance with AC, MS, multiple SBO's s/p multiple abdominal surgeries and recent CVA admitted to [facility] about 4 weeks ago for 4-6weeks who presented to the ED today after being sent in from Dr. Garay's office for tachycardia, low blood pressure and complaint of cough and sob.  Pt stated she has been having a cough with yellow phlegm since Last friday along with f/c/s and congestion and myalgias.  Pt stated her sister also was sick last week with similar symptoms. She denied wheezing at home or increased use of her inhalers.  Pt also stated she was admitted at [facility] about 4 weeks ago for a stroke that she had and was told she had a brain bleed but unclear if actual brain bleed as patient stated she was also taking lovenox at home.  Since her stroke about 4 weeks ago the patient has admitted to having continued lower extremity weakness and numbness in her toes up to mid shin with difficulty walking and getting out of bed.  Pt denied back pain.  She also stated she has had multiple SBO's about 6 of them and that she hasn't had a BM in about one week and has had a diffuse cramping abdominal pain for the last few days with an episode of nausea last week, but no vomiting.  She admitted to decreased PO intake at home.  In the ED patient had a CT angio to r/o PE and vital signs were Tmax: 99.0, HR: , BP 98/61, RR 18 and sO2 of 92% room air and 98% on 2L NC. (25 Jan 2017 22:05)    REVIEW OF SYSTEMS:  Constitutional: No fever, weight loss, chills or fatigue  Eyes: No eye pain, visual disturbances, or discharge  ENMT:  No difficulty hearing, tinnitus, vertigo; No sinus or throat pain. No epistaxis, dysphagia, dysphonia, hoarseness or odynophagia  Neck: No pain, stiffness or neck swelling.  No masses or deformities  Respiratory: No cough, wheezing, chills or hemoptysis  - COPD  - ILD   - PE   - ASTHMA     - PNEUMONIA  Cardiovascular: No chest pain, dysrhythmia, palpitations, dizziness or edema   - COPD     - CAD   - CHF   - HTN  Gastrointestinal: No abdominal or epigastric pain. No nausea, vomiting or hematemesis; No diarrhea or constipation. No melena or hematochezia. No dysphagia or Icterus.          Genitourinary: No dysuria, frequency, hematuria or incontinence   - CKD/PADDY      - ESRD  Neurological: No headaches, memory loss, loss of strength, numbness or tremors      -DEMENTIA     - STROKE    - SEIZURE  Skin: No itching, burning, rashes or lesions   Lymph Nodes: No enlarged glands  Endocrine: No heat or cold intolerance; No hair loss       - DM     - THYROID DISORDER  Musculoskeletal: No joint pain or swelling; No muscle, back or extremity pain  Psychiatric: No depression, anxiety, mood swings or difficulty sleeping  Heme/Lymph: No easy bruising or bleeding gums         - ANEMIA      - CANCER   -COAGULOPATHY  Allergy and Immunologic: No hives or eczema    PAST MEDICAL & SURGICAL HISTORY:  CVA (cerebral vascular accident)  Anemia  G tube feedings  History of DVT (deep vein thrombosis)  MS (multiple sclerosis)  Small bowel obstruction: multiple, recurrent  Asthma  SBO (small bowel obstruction): multiple    FAMILY HISTORY:  No pertinent family history in first degree relatives    SOCIAL HISTORY:      - Tobacco     - ETOH    Allergies    dust (Unknown)  latex (Unknown)  No Known Drug Allergies  Nuts (Anaphylaxis)  peanuts (Unknown)    Intolerances      Vital Signs Last 24 Hrs  T(C): 36.7, Max: 36.8 (01-28 @ 20:46)  T(F): 98, Max: 98.2 (01-28 @ 20:46)  HR: 103 (80 - 103)  BP: 120/77 (120/77 - 141/79)  BP(mean): --  RR: 20 (17 - 20)  SpO2: 100% (96% - 100%)    I & Os for current day (as of 01-29 @ 10:38)  =============================================  IN: 200 ml / OUT: 0 ml / NET: 200 ml      PHYSICAL EXAM:  Un Comfortable, + distress  Eyes: PERRL, EOM intact; conjunctiva and sclera clear  Head: Normocephalic;  No Trauma  ENMT: No nasal discharge,+ hoarseness, +cough or hemoptysis.  Neck: Supple; non tender; no masses or deformities.    No JVD  Respiratory:  + more  WHEEZING  + RHONCHI  - RALES  + R base CRACKLES.  Diminished breath sounds  BILATERAL  RIGHT  LEFT  Cardiovascular: Regular rate and rhythm. S1 and S2 Normal; No murmurs, gallops or rubs     - PPM/AICD  Gastrointestinal: Soft non-tender, non-distended; Normal bowel sounds; No hepatosplenomegaly.     -PEG    -  GT   - AMARAL  Genitourinary: No costovertebral angle tenderness. No dysuria  Extremities: AROM, No clubbing, cyanosis or edema    Vascular: Peripheral pulses palpable 2+ bilaterally  Neurological: Alert andand anxious  Skin: Warm and dry. No obvious rash  Lymph Nodes: No acute cervical or supraclavicular adenopathy  Psychiatric: anxious    DEVICES:  - DENTURES   +IV R / L     - ETUBE   -TRACH   -CTUBE  R / L      LABS:                          9.6    3.5   )-----------( 327      ( 29 Jan 2017 06:14 )             30.9     29 Jan 2017 06:14    140    |  109    |  11     ----------------------------<  128    4.6     |  23     |  0.49     Ca    7.7        29 Jan 2017 06:14  Mg     2.4       29 Jan 2017 06:14      INTERPRETATION:  CT Pulmonary Angiography (CTPA) of the CHEST dated   1/25/2017 6:40 PM    INDICATION: r/o PE     TECHNIQUE: CT angiography of the pulmonary arteries was performed during   rapid bolus injection of intravenous contrast.  Post-processing including   the production of axial and coronal multiplanar reformatted images and   coronal and axial maximum intensity projections (MIPs) was performed.    PRIOR STUDY: None. In addition there is bilateral mosaic perfusion   pattern to suggest air-trapping from underlying small airway   inflammation. No consolidation or pleural collections. Linear atelectasis   and parenchymal scarring with distortion of the left major fissure focal   interrogation of the left hemidiaphragm is identified. There is a small   loculated left-sided pleuraleffusion. There is immediate adjacent   compressive atelectasis/early pneumonitis (sagittal image 38, series 8   and axial image 199, series 4).    FINDINGS:    LUNGS: There is evidence of small and large airway inflammation   characterized by bronchial wall thickening as well as scattered   tree-in-bud micronodular opacities. No pulmonary abnormalities are   evident.      MEDIASTINUM: Pulmonary arteries are of normal caliber. No  main, lobar or   segmental branch acute or chronic pulmonary embolism. No evidence of   right ventricular strain. The heart is normal in size.  No pericardial   effusion is seen. The thoracic aorta is normal in appearance. No   mediastinal, hilar or axillary lymphadenopathy is seen.    UPPER ABDOMEN, SOFT TISSUES AND BONES: Limited evaluation of the upper   abdomen demonstrates redemonstrated is an oval appearing cystic structure   demonstrating simple fluid attenuation within the subcapsular region of   segment 4A which appears slightly larger in size when compared to prior   study measuring 3.5 cm in AP dimension and 3.2 cm and prior examination   dated 2/10/2016. May be dilated tubular structure immediately anteriorly   possibly representing intrahepatic bile ductal dilatation. Surgical clips   are noted in the region of the stomach to suggest gastric sleeve and   possibly gastrojejunostomy. Osseous structures demonstrate multiple   compression fracture deformities involving L1 T8 and T9.     IMPRESSION:    1. No evidence of pulmonary artery emboli. No evidence of right   ventricular strain.  2. Findings compatible with small large airway inflammation with   bronchial wall thickening and scattered tree-in-bud micronodular pattern.   There is a mucous plugging are noted within the left lower lobe.  3. Focal consolidation/atelectasis involving the left lower lobe with a   small possible left parapneumonic effusion.  4. Linear areas of scarring and/or atelectasis in the left lower lung   zone.  5. Multiple low-attenuation intrahepatic structures possibly representing   cysts the largest of which has increased in size when compared to   2/10/2016 measuring up to 3.5 cm. There may be new and hepatic bile duct   dilatation. Further characterization of these findings with a nonemergent   hepatic ultrasound is suggested.  RADIOLOGY & ADDITIONAL STUDIES (The following images were personally reviewed):

## 2017-01-29 NOTE — PROGRESS NOTE ADULT - SUBJECTIVE AND OBJECTIVE BOX
OVERNIGHT EVENTS: MARCELLUS    SUBJECTIVE: Complains of generalized pain.     VITAL SIGNS: Last 24 Hrs  T(F): 98, Max: 98.2 (01-28 @ 20:46)  HR: 100 (80 - 103)  BP: 120/77 (120/77 - 141/79)  RR: 22 (17 - 22)  SpO2: 96% (96% - 100%)    CAPILLARY BLOOD GLUCOSE  87 (12:02)  93 (08:00)  102 (17:30)    PHYSICAL EXAM:  Constitutional: WDWN, moderate distress due to pain  HEENT: sclera non-icteric, right scleral hemorrhage, no conjunctival pallor, dry MM  Respiratory: Right sided rhonchi, mild expiratory wheezing on the left side  Cardiovascular: RRR, normal s1/s2, no MRG  Gastrointestinal: soft, ND, +BS, diffuse abdominal tenderness, voluntary guarding, no organomegaly  Extremities: WWP, DP/radial pulses 2+ b/l, no edema  Neurological: AAOx 3, responds to commands, moves all extremities, CN 2-12 intact  Musculoskeletal: 5/5 in UE b/l, 4/5 in LE b/l    MEDICATIONS  (STANDING):  piperacillin/tazobactam IVPB.  IV Intermittent   piperacillin/tazobactam IVPB. 4.5Gram(s) IV Intermittent every 6 hours  insulin lispro (HumaLOG) corrective regimen sliding scale  SubCutaneous Before meals and at bedtime  ALBUTerol/ipratropium for Nebulization 3milliLiter(s) Nebulizer every 4 hours  cholecalciferol 1000Unit(s) Oral daily  levETIRAcetam 1000milliGRAM(s) Oral two times a day  ferrous    sulfate 325milliGRAM(s) Oral daily  oseltamivir 75milliGRAM(s) Oral two times a day  pantoprazole    Tablet 40milliGRAM(s) Oral before breakfast  buDESOnide  180 MICROgram(s) Inhaler 1Puff(s) Inhalation daily  heparin  Injectable 5000Unit(s) SubCutaneous every 8 hours  methylPREDNISolone sodium succinate Injectable 40milliGRAM(s) IV Push every 8 hours  polyethylene glycol 3350 17Gram(s) Oral two times a day  gabapentin 300milliGRAM(s) Oral daily    MEDICATIONS  (PRN):  methadone 10milliGRAM(s) Oral every 6 hours PRN pain  zolpidem 5milliGRAM(s) Oral at bedtime PRN Insomnia  LORazepam     Tablet 0.5milliGRAM(s) Oral every 8 hours PRN Anxiety    ALLERGIES: dust (Unknown)  latex (Unknown)  No Known Drug Allergies  Nuts (Anaphylaxis)  peanuts (Unknown)    LABS: ( 29 Jan 2017 06:14 )                        9.6    3.5   )-----------( 327                   30.9     140    |  109    |  11     ----------------------------<  128    4.6     |  23     |  0.49     Ca    7.7        Mg     2.4

## 2017-01-29 NOTE — PHYSICAL THERAPY INITIAL EVALUATION ADULT - ADDITIONAL COMMENTS
Patient lives in an elevator apartment building with her brother and sister. Ambulates short distances with a RW.

## 2017-01-29 NOTE — PROGRESS NOTE ADULT - ASSESSMENT
62 y/o F w/ PMH of anemia, asthma, DVT, MS, multiple SBO's s/p multiple abdominal surgeries, and recent CVA, initially admitted for SOB and cough, w/ Influenza and HCAP.

## 2017-01-29 NOTE — PROGRESS NOTE ADULT - SUBJECTIVE AND OBJECTIVE BOX
Pt seen and examined   Still needing supplemental O2    REVIEW OF SYSTEMS:  Constitutional: No fever, weight loss or fatigue  Cardiovascular: No chest pain, palpitations, dizziness or leg swelling  Resp: less sob  Gastrointestinal: No abdominal or epigastric pain. No nausea, vomiting or hematemesis; No diarrhea or constipation. No melena or hematochezia.  Skin: No itching, burning, rashes or lesions       MEDICATIONS:  MEDICATIONS  (STANDING):  piperacillin/tazobactam IVPB.  IV Intermittent   piperacillin/tazobactam IVPB. 4.5Gram(s) IV Intermittent every 6 hours  insulin lispro (HumaLOG) corrective regimen sliding scale  SubCutaneous Before meals and at bedtime  ALBUTerol/ipratropium for Nebulization 3milliLiter(s) Nebulizer every 4 hours  cholecalciferol 1000Unit(s) Oral daily  levETIRAcetam 1000milliGRAM(s) Oral two times a day  ferrous    sulfate 325milliGRAM(s) Oral daily  oseltamivir 75milliGRAM(s) Oral two times a day  pantoprazole    Tablet 40milliGRAM(s) Oral before breakfast  buDESOnide  180 MICROgram(s) Inhaler 1Puff(s) Inhalation daily  heparin  Injectable 5000Unit(s) SubCutaneous every 8 hours  methylPREDNISolone sodium succinate Injectable 40milliGRAM(s) IV Push every 8 hours  polyethylene glycol 3350 17Gram(s) Oral two times a day  gabapentin 300milliGRAM(s) Oral daily    MEDICATIONS  (PRN):  methadone 10milliGRAM(s) Oral every 6 hours PRN pain  zolpidem 5milliGRAM(s) Oral at bedtime PRN Insomnia  LORazepam     Tablet 0.5milliGRAM(s) Oral every 8 hours PRN Anxiety      Allergies    dust (Unknown)  latex (Unknown)  No Known Drug Allergies  Nuts (Anaphylaxis)  peanuts (Unknown)    Intolerances        Vital Signs Last 24 Hrs  T(C): 36.7, Max: 36.8 (01-28 @ 20:46)  T(F): 98, Max: 98.2 (01-28 @ 20:46)  HR: 100 (80 - 103)  BP: 120/77 (120/77 - 141/79)  BP(mean): --  RR: 22 (17 - 22)  SpO2: 96% (96% - 100%)    I & Os for current day (as of 01-29 @ 12:23)  =============================================  IN: 200 ml / OUT: 0 ml / NET: 200 ml      PHYSICAL EXAM:    General: Well developed; well nourished; in no acute distress  HEENT: MMM, conjunctiva and sclera clear  Gastrointestinal: Soft non-tender non-distended; Normal bowel sounds; No hepatosplenomegaly  Skin: Warm and dry. No obvious rash    LABS:      CBC Full  -  ( 29 Jan 2017 06:14 )  WBC Count : 3.5 K/uL  Hemoglobin : 9.6 g/dL  Hematocrit : 30.9 %  Platelet Count - Automated : 327 K/uL  Mean Cell Volume : 85.6 fL  Mean Cell Hemoglobin : 26.6 pg  Mean Cell Hemoglobin Concentration : 31.1 g/dL  Auto Neutrophil # : x  Auto Lymphocyte # : x  Auto Monocyte # : x  Auto Eosinophil # : x  Auto Basophil # : x  Auto Neutrophil % : x  Auto Lymphocyte % : x  Auto Monocyte % : x  Auto Eosinophil % : x  Auto Basophil % : x    29 Jan 2017 06:14    140    |  109    |  11     ----------------------------<  128    4.6     |  23     |  0.49     Ca    7.7        29 Jan 2017 06:14  Mg     2.4       29 Jan 2017 06:14                        RADIOLOGY & ADDITIONAL STUDIES (The following images were personally reviewed):

## 2017-01-29 NOTE — CONSULT NOTE ADULT - ASSESSMENT
Acute Influenza A virus infection  Consolidative/lobar pneumonia  Chronic airway disease  Leukopenia  Recent hospitalizations possibly for infection(s)    RECOMMEND  Continue Oseltamivir  Continue IV Vanco and Pip-Tazo  Check Legionella UAg  Screen for HIV  Manual diff/blood smear  Consider hematology consult
Admitted for:  Problem/Plan - 1:  ·  Problem: Shortness of breath.  Plan: associated with cough, found to have tachycardic and low BP in ED, likely 2/2 PNA vs influenza, less likely PE.  -pt with CT angio showing no PE but mucous plugging, left sided infiltrate with possible parapneumonic effusion present. In setting of low BP, tachycardia and low grade temp of 99, will treat patient with vancomycin and zosyn for likely HCAP.  -check RVP to r/o influenza.  -holding off on steroids at this time as no wheezing on exam, however will start duonebs [id number].   -pt without work of breathing currently, will continue 2L NC for supplemental O2 for now.  -pulmonary to see patient in AM.     Problem/Plan - 2:  ·  Problem: History of DVT (deep vein thrombosis).  Plan: per patient h/o DVT in her left leg and previously on xarelto and lovenox.  -per Dr. Garay concern that patient may have been taking both xarelto and lovenox at home, will hold AC for now and check CT head prior to restarting AC as patient stated she had a brain bleed 4 weeks ago.  -coags currently normal, will monitor patient for signs of bleeding.     Problem/Plan - 3:  ·  Problem: CVA (cerebral vascular accident).  Plan: per patient she had a brain bleed 4 weeks ago and was admitted at [facility] for 4-6 weeks. will need to obtain collateral information in AM.  -will check CT head for possible bleed. no HSQ or AC until confirmed no brain bleed present.  -lower extremity weakness and numbness is stable since admission for stroke per patient.  -neurology to see patient in the AM.  -continue home keppra 1000mg bid for seizure prophylaxis.   Problem/Plan - 4:  ·  Problem: Uncomplicated asthma, unspecified asthma severity.  Plan: pt currently not wheezing on exam and hadn't received steroids or nebulizers prior.  -however ED ordered patient for solumedrol 125mg, pt does not appear to be in asthma exacerbation, will start duonebs [id number], but no need to continue steroids at this time.  -pulm to evaluate patient in the AM.     Problem/Plan - 5:  ·  Problem: MS (multiple sclerosis).  Plan: stable, pt does not appear to be in MS flare. will clarify medications in AM, if patient takes any.  -per Dr. Garay neuro Dr. mendel to see the patient in the AM.     Problem/Plan - 6:  Problem: Small bowel obstruction. Plan: h/o multiple SBOs in the past and now with mild diffuse abdominal pain, no rigidity, no rebound.  -pt suffers from chronic abdominal pain and on methadone per Dr. Garay  - no BM for one week, will check abdominal Xray and keep NPO for now.  -will continue home methadone 10mg [id number] if no obstruction noted.  -continue home protonixh/o multiple SBOs in the past and now with mild diffuse abdominal pain, no rigidity, no rebound.  -pt suffers from chronic abdominal pain and on methadone per Dr. Garay however unable to confirm dosage as no med list and [facility] is closed and unable to contact sister. will clarify in AM  - no BM for one week, will check abdominal Xray and keep NPO for now.  -will continue home methadone 10mg [id number] if no obstruction noted.  -continue home protonix.
This is a 62 y/o female with post viral PNA with increased work of breathing secondary to likely asthma exaerbation

## 2017-01-29 NOTE — PROGRESS NOTE ADULT - ASSESSMENT
ASSESSMENT/PLAN61 y/o female pt c respiratory insufficiency impending respiratory failure, AH3 influenza, likely superinfected, LLL pneumonia , asthma exacerbation Multiple sclerosis, leukopenia, CVA    1. O2 NRB at tis time  2. Bronchodilators:  Atrovent/ albuterol q 4 – 6 hours as needed , administered stat  3. Corticosteroids: SoluCortef 100mg IV stat, continue now SoluMedrol IV 40 mg Q 8 hrs  4. ID/Antibiotics:use oseltamivir and widespectrum at this time, attempt sputum CX  5. Cardiac/HTN: optimize hemodynamics,   6. GI: Rx/ prophylaxis c PPI/H2B  7. Heme: Rx/VT prophylaxis c SQH/SCD/AC  8. Aspiration precautions at all times  9. Recommend ICU transfer  Discussed with managing team in details , discussed c ID in details  The pt was bedside managed and monitored by me until [id number]%,

## 2017-01-29 NOTE — CONSULT NOTE ADULT - SUBJECTIVE AND OBJECTIVE BOX
Patient is a 61y old  Female who presents with a chief complaint of shortness of breath (26 Jan 2017 02:21)      HPI:  Pt is a 61 year old female with PMH of anemia, asthma, DVT with noncompliance with AC, MS, multiple SBO's s/p multiple abdominal surgeries and recent CVA admitted to [facility] about 4 weeks ago for 4-6weeks who presented after being sent in from PMD's office for tachycardia, low blood pressure and complaint of cough and sob.  Pt has been having a productive cough of yellow phlegm associated with congestion and myalgias. Of note, pt was admitted to [place] about 4 weeks prioror a stroke that she had and was told she had a brain bleed but unclear if actual brain bleed as patient stated she was also taking lovenox at home.  Since her stroke about 4 weeks ago the patient has admitted to having continued lower extremity weakness and numbness in her toes up to mid shin with difficulty walking and getting out of bed.  Pt denied back pain.  She also stated she has had multiple SBO's about 6 of them and that she hasn't had a BM in about one week and has had a diffuse cramping abdominal pain for the last few days with an episode of nausea last week, but no vomiting.  She admitted to decreased PO intake at home.  In the ED patient had a CT angio to r/o PE and vital signs were Tmax: 99.0, HR: , BP 98/61, RR 18 and sO2 of 92% room air and 98% on 2L NC. (25 Jan 2017 22:05)      Allergies    dust (Unknown)  latex (Unknown)  No Known Drug Allergies  Nuts (Anaphylaxis)  peanuts (Unknown)    Intolerances        MEDICATIONS  (STANDING):  piperacillin/tazobactam IVPB.  IV Intermittent   piperacillin/tazobactam IVPB. 4.5Gram(s) IV Intermittent every 6 hours  insulin lispro (HumaLOG) corrective regimen sliding scale  SubCutaneous Before meals and at bedtime  ALBUTerol/ipratropium for Nebulization 3milliLiter(s) Nebulizer every 4 hours  cholecalciferol 1000Unit(s) Oral daily  levETIRAcetam 1000milliGRAM(s) Oral two times a day  ferrous    sulfate 325milliGRAM(s) Oral daily  oseltamivir 75milliGRAM(s) Oral two times a day  pantoprazole    Tablet 40milliGRAM(s) Oral before breakfast  buDESOnide  180 MICROgram(s) Inhaler 1Puff(s) Inhalation daily  heparin  Injectable 5000Unit(s) SubCutaneous every 8 hours  methylPREDNISolone sodium succinate Injectable 40milliGRAM(s) IV Push every 8 hours  polyethylene glycol 3350 17Gram(s) Oral two times a day  gabapentin 300milliGRAM(s) Oral daily    MEDICATIONS  (PRN):  methadone 10milliGRAM(s) Oral every 6 hours PRN pain  zolpidem 5milliGRAM(s) Oral at bedtime PRN Insomnia  LORazepam     Tablet 0.5milliGRAM(s) Oral every 8 hours PRN Anxiety      Drug Dosing Weight  Height (cm): 160 (28 Jan 2017 12:14)  Weight (kg): 40.9 (28 Jan 2017 12:14)  BMI (kg/m2): 16 (28 Jan 2017 12:14)  BSA (m2): 1.38 (28 Jan 2017 12:14)    PAST MEDICAL & SURGICAL HISTORY:  CVA (cerebral vascular accident)  Anemia  G tube feedings  History of DVT (deep vein thrombosis)  MS (multiple sclerosis)  Small bowel obstruction: multiple, recurrent  Asthma  SBO (small bowel obstruction): multiple      FAMILY HISTORY:  No pertinent family history in first degree relatives      SOCIAL HISTORY:    ADVANCE DIRECTIVES:    REVIEW OF SYSTEMS      General:	    Skin/Breast:  	  Ophthalmologic:  	  ENMT:	    Respiratory and Thorax:  	  Cardiovascular:	    Gastrointestinal:	    Genitourinary:	    Musculoskeletal:	    Neurological:	    Psychiatric:	    Hematology/Lymphatics:	    Endocrine:	    Allergic/Immunologic:	        ICU Vital Signs Last 24 Hrs  T(C): 36.7, Max: 36.8 (01-28 @ 20:46)  T(F): 98, Max: 98.2 (01-28 @ 20:46)  HR: 100 (80 - 103)  BP: 120/77 (120/77 - 141/79)  BP(mean): --  ABP: --  ABP(mean): --  RR: 22 (17 - 22)  SpO2: 96% (96% - 100%)          I&O's Detail    I & Os for current day (as of 29 Jan 2017 13:11)  =============================================  IN:    Solution: 200 ml    Total IN: 200 ml  ---------------------------------------------  OUT:    Total OUT: 0 ml  ---------------------------------------------  Total NET: 200 ml      PHYSICAL EXAM:      Constitutional:    Eyes:    ENMT:    Neck:    Breasts:    Back:    Respiratory:    Cardiovascular:    Gastrointestinal:    Genitourinary:    Rectal:    Extremities:    Vascular:    Neurological:    Skin:    Lymph Nodes:    Musculoskeletal:    Psychiatric:        LABS:  CBC Full  -  ( 29 Jan 2017 06:14 )  WBC Count : 3.5 K/uL  Hemoglobin : 9.6 g/dL  Hematocrit : 30.9 %  Platelet Count - Automated : 327 K/uL  Mean Cell Volume : 85.6 fL  Mean Cell Hemoglobin : 26.6 pg  Mean Cell Hemoglobin Concentration : 31.1 g/dL  Auto Neutrophil # : x  Auto Lymphocyte # : x  Auto Monocyte # : x  Auto Eosinophil # : x  Auto Basophil # : x  Auto Neutrophil % : 65.0 %  Auto Lymphocyte % : 23.0 %  Auto Monocyte % : 8.0 %  Auto Eosinophil % : x  Auto Basophil % : x    29 Jan 2017 06:14    140    |  109    |  11     ----------------------------<  128    4.6     |  23     |  0.49     Ca    7.7        29 Jan 2017 06:14  Mg     2.4       29 Jan 2017 06:14      CAPILLARY BLOOD GLUCOSE  87 (29 Jan 2017 12:02)          EKG:    ECHO, US:    RADIOLOGY:    CRITICAL CARE TIME SPENT: Patient is a 61y old  Female who presents with a chief complaint of shortness of breath (26 Jan 2017 02:21)      HPI:  Pt is a 61 year old female with PMH of anemia, asthma, DVT with noncompliance with AC, MS, multiple SBO's s/p multiple abdominal surgeries and recent CVA admitted to [facility] about 4 weeks ago for 4-6weeks who presented after being sent in from PMD's office for tachycardia, low blood pressure and complaint of cough and sob.  Pt has been having a productive cough of yellow phlegm associated with congestion and myalgias. Of note, pt was admitted to [place] about 4 weeks prior for a stroke. On arrival VS were Tmax: 99.0, HR: , BP 98/61, RR 18 and sO2 of 92% room air and 98% on 2L NC. CT PE was negative but did reveal small large airway inflammation with bronchial wall thickening, mucous plugging are noted within the left lower lobe. Focal consolidation/atelectasis involving the left lower lobe with a small possible left parapneumonic effusion. RVP was positive for Flu. Pt was admitted for PNA in the setting of influenza. Pt was started on Tamiflu and Vanc/Zosyn for HCAP given that she was recently hospitalized. This AM, pt noted to be wheezing, started on Solumedrol and placed on nonrebreather. Oxygen saturation remained stable in mid to upper 90's.       Allergies    dust (Unknown)  latex (Unknown)  No Known Drug Allergies  Nuts (Anaphylaxis)  peanuts (Unknown)    Intolerances        MEDICATIONS  (STANDING):  piperacillin/tazobactam IVPB.  IV Intermittent   piperacillin/tazobactam IVPB. 4.5Gram(s) IV Intermittent every 6 hours  insulin lispro (HumaLOG) corrective regimen sliding scale  SubCutaneous Before meals and at bedtime  ALBUTerol/ipratropium for Nebulization 3milliLiter(s) Nebulizer every 4 hours  cholecalciferol 1000Unit(s) Oral daily  levETIRAcetam 1000milliGRAM(s) Oral two times a day  ferrous    sulfate 325milliGRAM(s) Oral daily  oseltamivir 75milliGRAM(s) Oral two times a day  pantoprazole    Tablet 40milliGRAM(s) Oral before breakfast  buDESOnide  180 MICROgram(s) Inhaler 1Puff(s) Inhalation daily  heparin  Injectable 5000Unit(s) SubCutaneous every 8 hours  methylPREDNISolone sodium succinate Injectable 40milliGRAM(s) IV Push every 8 hours  polyethylene glycol 3350 17Gram(s) Oral two times a day  gabapentin 300milliGRAM(s) Oral daily    MEDICATIONS  (PRN):  methadone 10milliGRAM(s) Oral every 6 hours PRN pain  zolpidem 5milliGRAM(s) Oral at bedtime PRN Insomnia  LORazepam     Tablet 0.5milliGRAM(s) Oral every 8 hours PRN Anxiety      Drug Dosing Weight  Height (cm): 160 (28 Jan 2017 12:14)  Weight (kg): 40.9 (28 Jan 2017 12:14)  BMI (kg/m2): 16 (28 Jan 2017 12:14)  BSA (m2): 1.38 (28 Jan 2017 12:14)    PAST MEDICAL & SURGICAL HISTORY:  CVA (cerebral vascular accident)  Anemia  G tube feedings  History of DVT (deep vein thrombosis)  MS (multiple sclerosis)  Small bowel obstruction: multiple, recurrent  Asthma  SBO (small bowel obstruction): multiple      FAMILY HISTORY:  No pertinent family history in first degree relatives            ICU Vital Signs Last 24 Hrs  T(C): 36.7, Max: 36.8 (01-28 @ 20:46)  T(F): 98, Max: 98.2 (01-28 @ 20:46)  HR: 100 (80 - 103)  BP: 120/77 (120/77 - 141/79)  BP(mean): --  ABP: --  ABP(mean): --  RR: 22 (17 - 22)  SpO2: 96% (96% - 100%)          I&O's Detail    I & Os for current day (as of 29 Jan 2017 13:11)  =============================================  IN:    Solution: 200 ml    Total IN: 200 ml  ---------------------------------------------  OUT:    Total OUT: 0 ml  ---------------------------------------------  Total NET: 200 ml      PHYSICAL EXAM:      Constitutional: Frail, cachectic    Eyes:    ENMT:    Neck:    Breasts:    Back:    Respiratory:    Cardiovascular:    Gastrointestinal:    Genitourinary:    Rectal:    Extremities:    Vascular:    Neurological:    Skin:    Lymph Nodes:    Musculoskeletal:    Psychiatric:        LABS:  CBC Full  -  ( 29 Jan 2017 06:14 )  WBC Count : 3.5 K/uL  Hemoglobin : 9.6 g/dL  Hematocrit : 30.9 %  Platelet Count - Automated : 327 K/uL  Mean Cell Volume : 85.6 fL  Mean Cell Hemoglobin : 26.6 pg  Mean Cell Hemoglobin Concentration : 31.1 g/dL  Auto Neutrophil # : x  Auto Lymphocyte # : x  Auto Monocyte # : x  Auto Eosinophil # : x  Auto Basophil # : x  Auto Neutrophil % : 65.0 %  Auto Lymphocyte % : 23.0 %  Auto Monocyte % : 8.0 %  Auto Eosinophil % : x  Auto Basophil % : x    29 Jan 2017 06:14    140    |  109    |  11     ----------------------------<  128    4.6     |  23     |  0.49     Ca    7.7        29 Jan 2017 06:14  Mg     2.4       29 Jan 2017 06:14      CAPILLARY BLOOD GLUCOSE  87 (29 Jan 2017 12:02)          EKG:    ECHO, US:    RADIOLOGY:    CRITICAL CARE TIME SPENT: Patient is a 61y old  Female who presents with a chief complaint of shortness of breath (26 Jan 2017 02:21)      HPI:  Pt is a 61 year old female with PMH of anemia, asthma, DVT with noncompliance with AC, MS, multiple SBO's s/p multiple abdominal surgeries and recent CVA admitted to [facility] about 4 weeks ago for 4-6weeks who presented after being sent in from PMD's office for tachycardia, low blood pressure and complaint of cough and sob.  Pt has been having a productive cough of yellow phlegm associated with congestion and myalgias. Of note, pt was admitted to [place] about 4 weeks prior for a stroke. On arrival VS were Tmax: 99.0, HR: , BP 98/61, RR 18 and sO2 of 92% room air and 98% on 2L NC. CT PE was negative but did reveal small large airway inflammation with bronchial wall thickening, mucous plugging are noted within the left lower lobe. Focal consolidation/atelectasis involving the left lower lobe with a small possible left parapneumonic effusion. RVP was positive for Flu. Pt was admitted for PNA in the setting of influenza. Pt was started on Tamiflu and Vanc/Zosyn for HCAP given that she was recently hospitalized. This AM, pt noted to be wheezing, started on Solumedrol and placed on nonrebreather. Oxygen saturation remained stable in mid to upper 90's.       Allergies    dust (Unknown)  latex (Unknown)  No Known Drug Allergies  Nuts (Anaphylaxis)  peanuts (Unknown)    Intolerances        MEDICATIONS  (STANDING):  piperacillin/tazobactam IVPB.  IV Intermittent   piperacillin/tazobactam IVPB. 4.5Gram(s) IV Intermittent every 6 hours  insulin lispro (HumaLOG) corrective regimen sliding scale  SubCutaneous Before meals and at bedtime  ALBUTerol/ipratropium for Nebulization 3milliLiter(s) Nebulizer every 4 hours  cholecalciferol 1000Unit(s) Oral daily  levETIRAcetam 1000milliGRAM(s) Oral two times a day  ferrous    sulfate 325milliGRAM(s) Oral daily  oseltamivir 75milliGRAM(s) Oral two times a day  pantoprazole    Tablet 40milliGRAM(s) Oral before breakfast  buDESOnide  180 MICROgram(s) Inhaler 1Puff(s) Inhalation daily  heparin  Injectable 5000Unit(s) SubCutaneous every 8 hours  methylPREDNISolone sodium succinate Injectable 40milliGRAM(s) IV Push every 8 hours  polyethylene glycol 3350 17Gram(s) Oral two times a day  gabapentin 300milliGRAM(s) Oral daily    MEDICATIONS  (PRN):  methadone 10milliGRAM(s) Oral every 6 hours PRN pain  zolpidem 5milliGRAM(s) Oral at bedtime PRN Insomnia  LORazepam     Tablet 0.5milliGRAM(s) Oral every 8 hours PRN Anxiety      Drug Dosing Weight  Height (cm): 160 (28 Jan 2017 12:14)  Weight (kg): 40.9 (28 Jan 2017 12:14)  BMI (kg/m2): 16 (28 Jan 2017 12:14)  BSA (m2): 1.38 (28 Jan 2017 12:14)    PAST MEDICAL & SURGICAL HISTORY:  CVA (cerebral vascular accident)  Anemia  G tube feedings  History of DVT (deep vein thrombosis)  MS (multiple sclerosis)  Small bowel obstruction: multiple, recurrent  Asthma  SBO (small bowel obstruction): multiple      FAMILY HISTORY:  No pertinent family history in first degree relatives            ICU Vital Signs Last 24 Hrs  T(C): 36.7, Max: 36.8 (01-28 @ 20:46)  T(F): 98, Max: 98.2 (01-28 @ 20:46)  HR: 100 (80 - 103)  BP: 120/77 (120/77 - 141/79)  BP(mean): --  ABP: --  ABP(mean): --  RR: 22 (17 - 22)  SpO2: 96% (96% - 100%)          I&O's Detail    I & Os for current day (as of 29 Jan 2017 13:11)  =============================================  IN:    Solution: 200 ml    Total IN: 200 ml  ---------------------------------------------  OUT:    Total OUT: 0 ml  ---------------------------------------------  Total NET: 200 ml      PHYSICAL EXAM:      Constitutional: Frail, cachectic  HEENT: sclera non-icteric, right scleral hemorrhage, no conjunctival pallor, dry MM  Respiratory: Right sided rhonchi, mild expiratory wheezing on the left side  Cardiovascular: RRR, normal s1/s2  Gastrointestinal: soft, ND, +BS, diffuse abdominal tenderness, voluntary guarding, no organomegaly  Extremities: WWP, DP/radial pulses 2+ b/l, no edema  Neurological: AAOx 3, responds to commands, moves all extremities, CN 2-12 intact        LABS:  CBC Full  -  ( 29 Jan 2017 06:14 )  WBC Count : 3.5 K/uL  Hemoglobin : 9.6 g/dL  Hematocrit : 30.9 %  Platelet Count - Automated : 327 K/uL  Mean Cell Volume : 85.6 fL  Mean Cell Hemoglobin : 26.6 pg  Mean Cell Hemoglobin Concentration : 31.1 g/dL  Auto Neutrophil # : x  Auto Lymphocyte # : x  Auto Monocyte # : x  Auto Eosinophil # : x  Auto Basophil # : x  Auto Neutrophil % : 65.0 %  Auto Lymphocyte % : 23.0 %  Auto Monocyte % : 8.0 %  Auto Eosinophil % : x  Auto Basophil % : x    29 Jan 2017 06:14    140    |  109    |  11     ----------------------------<  128    4.6     |  23     |  0.49     Ca    7.7        29 Jan 2017 06:14  Mg     2.4       29 Jan 2017 06:14      CAPILLARY BLOOD GLUCOSE  87 (29 Jan 2017 12:02)

## 2017-01-30 LAB
ANION GAP SERPL CALC-SCNC: 7 MMOL/L — LOW (ref 9–16)
BUN SERPL-MCNC: 18 MG/DL — SIGNIFICANT CHANGE UP (ref 7–23)
CALCIUM SERPL-MCNC: 8.6 MG/DL — SIGNIFICANT CHANGE UP (ref 8.5–10.5)
CHLORIDE SERPL-SCNC: 111 MMOL/L — HIGH (ref 96–108)
CO2 SERPL-SCNC: 26 MMOL/L — SIGNIFICANT CHANGE UP (ref 22–31)
CREAT SERPL-MCNC: 0.55 MG/DL — SIGNIFICANT CHANGE UP (ref 0.5–1.3)
GLUCOSE SERPL-MCNC: 97 MG/DL — SIGNIFICANT CHANGE UP (ref 70–99)
HAV IGM SER-ACNC: SIGNIFICANT CHANGE UP
HBV CORE IGM SER-ACNC: SIGNIFICANT CHANGE UP
HBV SURFACE AG SER-ACNC: SIGNIFICANT CHANGE UP
HCT VFR BLD CALC: 30.6 % — LOW (ref 34.5–45)
HCV AB S/CO SERPL IA: 0.08 S/CO — SIGNIFICANT CHANGE UP
HCV AB SERPL-IMP: SIGNIFICANT CHANGE UP
HGB BLD-MCNC: 9.6 G/DL — LOW (ref 11.5–15.5)
HIV 1+2 AB+HIV1 P24 AG SERPL QL IA: SIGNIFICANT CHANGE UP
LYMPHOCYTES # BLD AUTO: 22.1 % — SIGNIFICANT CHANGE UP (ref 13–44)
MAGNESIUM SERPL-MCNC: 2.6 MG/DL — HIGH (ref 1.6–2.4)
MCHC RBC-ENTMCNC: 26.5 PG — LOW (ref 27–34)
MCHC RBC-ENTMCNC: 31.4 G/DL — LOW (ref 32–36)
MCV RBC AUTO: 84.5 FL — SIGNIFICANT CHANGE UP (ref 80–100)
MONOCYTES NFR BLD AUTO: 7.8 % — SIGNIFICANT CHANGE UP (ref 2–14)
NEUTROPHILS NFR BLD AUTO: 70.1 % — SIGNIFICANT CHANGE UP (ref 43–77)
PLATELET # BLD AUTO: 427 K/UL — HIGH (ref 150–400)
POTASSIUM SERPL-MCNC: 4.4 MMOL/L — SIGNIFICANT CHANGE UP (ref 3.5–5.3)
POTASSIUM SERPL-SCNC: 4.4 MMOL/L — SIGNIFICANT CHANGE UP (ref 3.5–5.3)
PROT SERPL-MCNC: 5.7 G/DL — LOW (ref 6–8.3)
PROT SERPL-MCNC: 5.7 G/DL — LOW (ref 6–8.3)
RBC # BLD: 3.62 M/UL — LOW (ref 3.8–5.2)
RBC # FLD: 18.1 % — HIGH (ref 10.3–16.9)
SODIUM SERPL-SCNC: 144 MMOL/L — SIGNIFICANT CHANGE UP (ref 135–145)
VANCOMYCIN FLD-MCNC: 8.2 UG/ML — SIGNIFICANT CHANGE UP
WBC # BLD: 4.1 K/UL — SIGNIFICANT CHANGE UP (ref 3.8–10.5)
WBC # FLD AUTO: 4.1 K/UL — SIGNIFICANT CHANGE UP (ref 3.8–10.5)

## 2017-01-30 PROCEDURE — 99232 SBSQ HOSP IP/OBS MODERATE 35: CPT

## 2017-01-30 RX ORDER — VANCOMYCIN HCL 1 G
750 VIAL (EA) INTRAVENOUS EVERY 12 HOURS
Qty: 0 | Refills: 0 | Status: DISCONTINUED | OUTPATIENT
Start: 2017-01-30 | End: 2017-01-30

## 2017-01-30 RX ORDER — METHADONE HYDROCHLORIDE 40 MG/1
10 TABLET ORAL EVERY 8 HOURS
Qty: 0 | Refills: 0 | Status: DISCONTINUED | OUTPATIENT
Start: 2017-01-30 | End: 2017-02-03

## 2017-01-30 RX ORDER — IPRATROPIUM/ALBUTEROL SULFATE 18-103MCG
3 AEROSOL WITH ADAPTER (GRAM) INHALATION ONCE
Qty: 0 | Refills: 0 | Status: COMPLETED | OUTPATIENT
Start: 2017-01-30 | End: 2017-01-30

## 2017-01-30 RX ORDER — HYDROMORPHONE HYDROCHLORIDE 2 MG/ML
4 INJECTION INTRAMUSCULAR; INTRAVENOUS; SUBCUTANEOUS ONCE
Qty: 0 | Refills: 0 | Status: DISCONTINUED | OUTPATIENT
Start: 2017-01-30 | End: 2017-01-30

## 2017-01-30 RX ORDER — HYDROMORPHONE HYDROCHLORIDE 2 MG/ML
0.5 INJECTION INTRAMUSCULAR; INTRAVENOUS; SUBCUTANEOUS EVERY 4 HOURS
Qty: 0 | Refills: 0 | Status: DISCONTINUED | OUTPATIENT
Start: 2017-01-30 | End: 2017-01-30

## 2017-01-30 RX ORDER — HYDROMORPHONE HYDROCHLORIDE 2 MG/ML
4 INJECTION INTRAMUSCULAR; INTRAVENOUS; SUBCUTANEOUS EVERY 6 HOURS
Qty: 0 | Refills: 0 | Status: DISCONTINUED | OUTPATIENT
Start: 2017-01-30 | End: 2017-01-30

## 2017-01-30 RX ORDER — ACETAMINOPHEN 500 MG
650 TABLET ORAL EVERY 6 HOURS
Qty: 0 | Refills: 0 | Status: DISCONTINUED | OUTPATIENT
Start: 2017-01-30 | End: 2017-02-03

## 2017-01-30 RX ORDER — VANCOMYCIN HCL 1 G
1250 VIAL (EA) INTRAVENOUS EVERY 12 HOURS
Qty: 0 | Refills: 0 | Status: DISCONTINUED | OUTPATIENT
Start: 2017-01-30 | End: 2017-01-31

## 2017-01-30 RX ORDER — ONDANSETRON 8 MG/1
4 TABLET, FILM COATED ORAL ONCE
Qty: 0 | Refills: 0 | Status: COMPLETED | OUTPATIENT
Start: 2017-01-30 | End: 2017-01-30

## 2017-01-30 RX ADMIN — Medication 3 MILLILITER(S): at 23:43

## 2017-01-30 RX ADMIN — Medication 3 MILLILITER(S): at 00:56

## 2017-01-30 RX ADMIN — HYDROMORPHONE HYDROCHLORIDE 0.5 MILLIGRAM(S): 2 INJECTION INTRAMUSCULAR; INTRAVENOUS; SUBCUTANEOUS at 07:54

## 2017-01-30 RX ADMIN — LEVETIRACETAM 1000 MILLIGRAM(S): 250 TABLET, FILM COATED ORAL at 07:01

## 2017-01-30 RX ADMIN — HYDROMORPHONE HYDROCHLORIDE 4 MILLIGRAM(S): 2 INJECTION INTRAMUSCULAR; INTRAVENOUS; SUBCUTANEOUS at 19:41

## 2017-01-30 RX ADMIN — PIPERACILLIN AND TAZOBACTAM 200 GRAM(S): 4; .5 INJECTION, POWDER, LYOPHILIZED, FOR SOLUTION INTRAVENOUS at 07:02

## 2017-01-30 RX ADMIN — HYDROMORPHONE HYDROCHLORIDE 4 MILLIGRAM(S): 2 INJECTION INTRAMUSCULAR; INTRAVENOUS; SUBCUTANEOUS at 18:21

## 2017-01-30 RX ADMIN — Medication 166.67 MILLIGRAM(S): at 11:16

## 2017-01-30 RX ADMIN — Medication 0.5 MILLIGRAM(S): at 10:35

## 2017-01-30 RX ADMIN — Medication 166.67 MILLIGRAM(S): at 22:56

## 2017-01-30 RX ADMIN — Medication 40 MILLIGRAM(S): at 15:26

## 2017-01-30 RX ADMIN — Medication 325 MILLIGRAM(S): at 11:45

## 2017-01-30 RX ADMIN — PIPERACILLIN AND TAZOBACTAM 200 GRAM(S): 4; .5 INJECTION, POWDER, LYOPHILIZED, FOR SOLUTION INTRAVENOUS at 12:45

## 2017-01-30 RX ADMIN — PIPERACILLIN AND TAZOBACTAM 200 GRAM(S): 4; .5 INJECTION, POWDER, LYOPHILIZED, FOR SOLUTION INTRAVENOUS at 23:48

## 2017-01-30 RX ADMIN — GABAPENTIN 300 MILLIGRAM(S): 400 CAPSULE ORAL at 11:46

## 2017-01-30 RX ADMIN — Medication 3 MILLILITER(S): at 09:46

## 2017-01-30 RX ADMIN — Medication 0.5 MILLIGRAM(S): at 00:55

## 2017-01-30 RX ADMIN — Medication 40 MILLIGRAM(S): at 21:57

## 2017-01-30 RX ADMIN — ONDANSETRON 4 MILLIGRAM(S): 8 TABLET, FILM COATED ORAL at 23:43

## 2017-01-30 RX ADMIN — METHADONE HYDROCHLORIDE 10 MILLIGRAM(S): 40 TABLET ORAL at 21:57

## 2017-01-30 RX ADMIN — PIPERACILLIN AND TAZOBACTAM 200 GRAM(S): 4; .5 INJECTION, POWDER, LYOPHILIZED, FOR SOLUTION INTRAVENOUS at 18:00

## 2017-01-30 RX ADMIN — ENOXAPARIN SODIUM 40 MILLIGRAM(S): 100 INJECTION SUBCUTANEOUS at 17:36

## 2017-01-30 RX ADMIN — Medication 75 MILLIGRAM(S): at 17:37

## 2017-01-30 RX ADMIN — PANTOPRAZOLE SODIUM 40 MILLIGRAM(S): 20 TABLET, DELAYED RELEASE ORAL at 07:15

## 2017-01-30 RX ADMIN — ZOLPIDEM TARTRATE 5 MILLIGRAM(S): 10 TABLET ORAL at 02:28

## 2017-01-30 RX ADMIN — Medication 3 MILLILITER(S): at 14:00

## 2017-01-30 RX ADMIN — HYDROMORPHONE HYDROCHLORIDE 0.5 MILLIGRAM(S): 2 INJECTION INTRAMUSCULAR; INTRAVENOUS; SUBCUTANEOUS at 08:12

## 2017-01-30 RX ADMIN — Medication 1 PUFF(S): at 11:45

## 2017-01-30 RX ADMIN — POLYETHYLENE GLYCOL 3350 17 GRAM(S): 17 POWDER, FOR SOLUTION ORAL at 07:02

## 2017-01-30 RX ADMIN — ENOXAPARIN SODIUM 40 MILLIGRAM(S): 100 INJECTION SUBCUTANEOUS at 07:15

## 2017-01-30 RX ADMIN — Medication 75 MILLIGRAM(S): at 07:01

## 2017-01-30 RX ADMIN — Medication 3 MILLILITER(S): at 18:30

## 2017-01-30 RX ADMIN — Medication 3 MILLILITER(S): at 21:57

## 2017-01-30 RX ADMIN — Medication 1000 UNIT(S): at 11:51

## 2017-01-30 RX ADMIN — LEVETIRACETAM 1000 MILLIGRAM(S): 250 TABLET, FILM COATED ORAL at 17:36

## 2017-01-30 RX ADMIN — Medication 40 MILLIGRAM(S): at 07:01

## 2017-01-30 NOTE — PROGRESS NOTE ADULT - SUBJECTIVE AND OBJECTIVE BOX
INTERVAL HPI: weekend events ntoed, currently +sob with wheezing no palp no syncope  	  MEDICATIONS:    piperacillin/tazobactam IVPB.  IV Intermittent   piperacillin/tazobactam IVPB. 4.5Gram(s) IV Intermittent every 6 hours  oseltamivir 75milliGRAM(s) Oral two times a day  vancomycin  IVPB 1250milliGRAM(s) IV Intermittent every 12 hours    ALBUTerol/ipratropium for Nebulization 3milliLiter(s) Nebulizer every 4 hours  buDESOnide  180 MICROgram(s) Inhaler 1Puff(s) Inhalation daily    levETIRAcetam 1000milliGRAM(s) Oral two times a day  methadone 10milliGRAM(s) Oral every 6 hours PRN  zolpidem 5milliGRAM(s) Oral at bedtime PRN  gabapentin 300milliGRAM(s) Oral daily  LORazepam     Tablet 0.5milliGRAM(s) Oral every 8 hours PRN  HYDROmorphone  Injectable 0.5milliGRAM(s) IV Push every 4 hours PRN    pantoprazole    Tablet 40milliGRAM(s) Oral before breakfast  polyethylene glycol 3350 17Gram(s) Oral two times a day    insulin lispro (HumaLOG) corrective regimen sliding scale  SubCutaneous Before meals and at bedtime  methylPREDNISolone sodium succinate Injectable 40milliGRAM(s) IV Push every 8 hours    cholecalciferol 1000Unit(s) Oral daily  ferrous    sulfate 325milliGRAM(s) Oral daily  enoxaparin Injectable 40milliGRAM(s) SubCutaneous every 12 hours      REVIEW OF SYSTEMS:  [x] As per HPI  CONSTITUTIONAL: No fever, weight loss, or fatigue  RESPIRATORY: No cough, wheezing, chills or hemoptysis; No Shortness of Breath  CARDIOVASCULAR: No chest pain, palpitations, dizziness, or leg swelling  GASTROINTESTINAL: No abdominal or epigastric pain. No nausea, vomiting, or hematemesis; No diarrhea or constipation. No melena or hematochezia.  MUSCULOSKELETAL: No joint pain or swelling; No muscle, back, or extremity pain  [x] All others negative	  [ ] Unable to obtain    PHYSICAL EXAM:  T(C): 36.7, Max: 36.8 (01-30 @ 06:00)  HR: 85 (85 - 105)  BP: 128/80 (114/78 - 131/86)  RR: 16 (14 - 21)  SpO2: 97% (94% - 100%)  Wt(kg): --  I&O's Summary    I & Os for current day (as of 30 Jan 2017 12:49)  =============================================  IN: 200 ml / OUT: 0 ml / NET: 200 ml        Appearance: Normal	  HEENT:   Normal oral mucosa  Cardiovascular: Normal S1 S2, No JVD, No murmurs, No edema  Respiratory: Lungs clear to auscultation	  Gastrointestinal:  Soft, Non-tender, + BS	  Extremities: Normal range of motion, No clubbing, cyanosis or edema  Vascular: Peripheral pulses palpable 2+ bilaterally    TELEMETRY: 	    ECG:    	  RADIOLOGY:   CXR:  CT:  US:    CARDIAC TESTING:  Echocardiogram:  Catheterization:  Stress Test:      LABS:	 	    CARDIAC MARKERS:                                  9.6    4.1   )-----------( 427      ( 30 Jan 2017 05:53 )             30.6     30 Jan 2017 05:53    144    |  111    |  18     ----------------------------<  97     4.4     |  26     |  0.55     Ca    8.6        30 Jan 2017 05:53  Mg     2.6       30 Jan 2017 05:53      proBNP:   Lipid Profile:   HgA1c:   TSH:     ASSESSMENT/PLAN: 	  Echo - unusuale AO mobile mass - ?etiology of CVA would recommend ADRYAN to clarify mass  HTN - at goal, observe  SOB - mucous plugging no PE  Hx DVT - dopplers neg  replete electrolytes

## 2017-01-30 NOTE — CONSULT NOTE ADULT - SUBJECTIVE AND OBJECTIVE BOX
Pain Management Consult [facility] [phone]    Chief Complaint:  Chronic Abdominal Pain    HPI:  61 year old female with PMH of anemia, asthma, DVT, MS, multiple SBO's s/p multiple abdominal surgeries and recent CVA admitted to [facility] about 4 weeks ago for 4-6weeks who was admitted after being seen PMD's office for tachycardia, low blood pressure and complaint of cough and sob.  Pt has been having a productive cough of yellow phlegm associated with congestion and myalgias.  Pt was admitted for PNA in the setting of influenza. Pt was started on Tamiflu and Vanc/Zosyn for HCAP given that she was recently hospitalized.  Pt is c/o uncontrolled chronic abdominal pain.  Pt also describes B/L leg pain.  Pt states she is treated by pain management (outpatient) for this with Dr Santos.  IStop was checked and pt last filled a prescription for Methadone 10mg #120 and Dilaudid 4mg #120 on 12/19/16.  Prior to July pt was on Methadone 10mg 5/day and Dilaudid 8mg 4/day.      Pain is __x_ sharp __x__dull ___burning ___achy ___ Intensity: ____ mild _x__mod ___severe     Location ____surgical site ____cervical _____lumbar __x__abd ____upper ext___x_lower ext    Worse with ___x_activity __x__movement _____physical therapy___ Rest    Improved with __x__medication ____rest ____physical therapy    ROS: Const:  ___febrile   Eyes:___ENT:___CV: __+_chest pain  Resp: ____sob  GI:___nausea ___vomiting _+__abd pain ___npo ___clears __full diet __bm  :___ Musk: __+_pain ___spasm  Skin:___ Neuro:  __-_loceodty_-__ptuifvgvm___ numbness ___weakness ___paresth  Psych:__anxiety  Endo:___ Heme:___, _x__all others reviewed and negative    PAST MEDICAL & SURGICAL HISTORY:  CVA (cerebral vascular accident)  Anemia  G tube feedings  History of DVT (deep vein thrombosis)  MS (multiple sclerosis)  Small bowel obstruction: multiple, recurrent  Asthma  SBO (small bowel obstruction): multiple    Allergies    dust (Unknown)  latex (Unknown)  No Known Drug Allergies  Nuts (Anaphylaxis)  peanuts (Unknown)              SH: _-__Tobacco   _-__Alcohol                          FH:FAMILY HISTORY:  No pertinent family history in first degree relatives      piperacillin/tazobactam IVPB.  IV Intermittent   piperacillin/tazobactam IVPB. 4.5Gram(s) IV Intermittent every 6 hours  insulin lispro (HumaLOG) corrective regimen sliding scale  SubCutaneous Before meals and at bedtime  ALBUTerol/ipratropium for Nebulization 3milliLiter(s) Nebulizer every 4 hours  cholecalciferol 1000Unit(s) Oral daily  levETIRAcetam 1000milliGRAM(s) Oral two times a day  ferrous    sulfate 325milliGRAM(s) Oral daily  oseltamivir 75milliGRAM(s) Oral two times a day  pantoprazole    Tablet 40milliGRAM(s) Oral before breakfast  buDESOnide  180 MICROgram(s) Inhaler 1Puff(s) Inhalation daily  methylPREDNISolone sodium succinate Injectable 40milliGRAM(s) IV Push every 8 hours  polyethylene glycol 3350 17Gram(s) Oral two times a day  zolpidem 5milliGRAM(s) Oral at bedtime PRN  gabapentin 300milliGRAM(s) Oral daily  LORazepam     Tablet 0.5milliGRAM(s) Oral every 8 hours PRN  enoxaparin Injectable 40milliGRAM(s) SubCutaneous every 12 hours  vancomycin  IVPB 1250milliGRAM(s) IV Intermittent every 12 hours  methadone 10milliGRAM(s) Oral every 8 hours  HYDROmorphone   Tablet 4milliGRAM(s) Oral every 6 hours PRN      T(C): 36.6, Max: 36.8 (01-30 @ 06:00)  HR: 84 (84 - 105)  BP: 135/90 (114/78 - 135/90)  RR: 16 (14 - 21)  SpO2: 98% (94% - 100%)  Wt(kg): --        PHYSICAL EXAM:  Gen Appearance: ___no acute distress _x__appropriate        Neuro: x___SILT feet____ EOM Intact Psych: AAOX_3_, ___mood/affect appropriate        Eyes: _x__conjunctiva WNL  ___x__ Pupils equal and round        ENT: _x__ears and nose atraumatic__x_ Hearing grossly intact        Neck: ___trachea midline, no visible masses ___thyroid without palpable mass    Resp: ___Nml WOB____No tactile fremitus ___clear to auscultation    Cardio: ___extremities free from edema ____pedal pulses palpable    GI/Abdomen: ___soft ___+__ tender___-___Nondistended_____HSM    Lymphatic: ___no palpable nodes in neck  ___no palpable nodes calves and feet    Skin/Wound: ___Incision, ___Dressing c/d/i,   ____surrounding tissues soft,  ___drain/chest tube present____    Muscular: EHL _5__/5  Gastrocnemius__5_/5    ___absent clubbing/cyanosis      ASSESSMENT: This is a 61y old Female with a history of   SHORTNESS OF BREATH: SHORTNESS OF BREATH  No h/o HF  No pertinent family history in first degree relatives  Handoff  MEWS Score: 2 (30-Jan-2017 15:30)  CVA (cerebral vascular accident)  Anemia  G tube feedings  History of DVT (deep vein thrombosis)  MS (multiple sclerosis)  Small bowel obstruction: multiple, recurrent  Asthma  Anemia  Shortness of breath  Asthma: Asthma  Influenza: Influenza  HCAP (healthcare-associated pneumonia): HCAP (healthcare-associated pneumonia)  Nutrition, metabolism, and development symptoms: Nutrition, metabolism, and development symptoms  Prophylactic measure: Prophylactic measure  Anemia: Anemia  Small bowel obstruction: Small bowel obstruction  MS (multiple sclerosis): MS (multiple sclerosis)  Uncomplicated asthma, unspecified asthma severity: Uncomplicated asthma, unspecified asthma severity  CVA (cerebral vascular accident): CVA (cerebral vascular accident)  History of DVT (deep vein thrombosis): History of DVT (deep vein thrombosis)  Shortness of breath: Shortness of breath  SBO (small bowel obstruction): multiple  SHORTNESS OF BREATH: SHORTNESS OF BREATH  90+  Uncomplicated asthma, unspecified asthma severity  Chronic abdominal pain s/p multiple SBO's  Opioid tolerance    Recommended Treatment PLAN:    1. Recommend Methadone 10mg q8h  2. Recommend Dilaudid 4mg po q6h prn  3. Continue Dilaudid 0.5mh IV q4h - only give if po pain meds are given first and ineffective

## 2017-01-30 NOTE — PROGRESS NOTE ADULT - ASSESSMENT
62 y/o F w/ PMH of anemia, asthma, DVT, MS, multiple SBO's s/p multiple abdominal surgeries, and recent CVA, initially admitted for SOB and cough, w/ HCAP and asthma exacerbation.

## 2017-01-30 NOTE — PROGRESS NOTE ADULT - ASSESSMENT
ASSESSMENT/PLAN61 y/o female pt c respiratory insufficiency impending respiratory failure, AH3 influenza, likely superinfected, LLL pneumonia , asthma exacerbation Multiple sclerosis, leukopenia, CVA    1. O2 NRB at tis time  2. Bronchodilators:  Atrovent/ albuterol q 4 – 6 hours as needed , administered stat  3. Corticosteroids: SoluCortef 100mg IV stat, continue now SoluMedrol IV 40 mg Q 8 hrs  4. ID/Antibiotics:use oseltamivir and widespectrum at this time, attempt sputum CX  5. Cardiac/HTN: optimize hemodynamics,   6. GI: Rx/ prophylaxis c PPI/H2B  7. Heme: Rx/VT prophylaxis c SQH/SCD/AC  8. Aspiration precautions at all times  9. May need  ICU transfer  oob, chair  Discussed in details  The pt was bedside managed and monitored by me until Soy864%,

## 2017-01-30 NOTE — CONSULT NOTE ADULT - SUBJECTIVE AND OBJECTIVE BOX
Patient is a 61 year old  female with a history of Anemia, Asthma, DVT with noncompliance with AC, history of Multiple Scelerosis, multiple small bowel obstuctions with chronic abdominal pain now on Methadone and Dilaudid for pain control and recent CVA with admission to [facility] 1 month ago and who was admitted to [facility] on 1/25/17 with cough, shortness of breath and increasing weakness. Following admission patient treated for pneumonia and chronic pain.      Patient is a retired  and lives at home with her mother and her brother.        Mental Status: Patient is a 61 year old female sitting up in bed who is awake and engagable. Patient appears depressed and is asking for Dilaudid and says it makes her feel better,   Speech is clear and gives brief answers Affect is constricted. Mood appears depressed. Thought processes are linear. There are no auditory or visual hallucinations. There is no suicidal or homicidal ideation. Judgement is fair. Insight is limited.       Impression : Adjustment reaction                        Chronic pain syndrome.          Recommend: Supportive therapy. Will discuss

## 2017-01-30 NOTE — CONSULT NOTE ADULT - SUBJECTIVE AND OBJECTIVE BOX
I had seen Ms Berna Banuelos in the initial Rheum consult today.  She is a 61 year old woman, who was admitted on 1/15/17 for SOB and cough.  She has chronic anemia, asthma, history of DVT, MS, recurrent SBO with multiple abdominal Sxs, CVA, for which she was admitted to [facility] 4 weeks ago.  She developed SOB, cough and fever and was admitted to [facility].  She is a poor historian and is not able to provide detailed history.  She has hip pains x 3 days and stiffness in AM, proximal arm and generalized weakness as well as Wt loss by >10 lb in a few weeks.  She is inot consistent with Dr follow ups and her medications.    ROS is negative for Raynaud's, photosensitive rash, sicca, joint swelling, hair loss, CP.  It is + for cough, SOB, palpitations, low WBC.    MEDICATIONS  (STANDING):  piperacillin/tazobactam IVPB.  IV Intermittent   piperacillin/tazobactam IVPB. 4.5Gram(s) IV Intermittent every 6 hours  insulin lispro (HumaLOG) corrective regimen sliding scale  SubCutaneous Before meals and at bedtime  ALBUTerol/ipratropium for Nebulization 3milliLiter(s) Nebulizer every 4 hours  cholecalciferol 1000Unit(s) Oral daily  levETIRAcetam 1000milliGRAM(s) Oral two times a day  ferrous    sulfate 325milliGRAM(s) Oral daily  oseltamivir 75milliGRAM(s) Oral two times a day  pantoprazole    Tablet 40milliGRAM(s) Oral before breakfast  buDESOnide  180 MICROgram(s) Inhaler 1Puff(s) Inhalation daily  methylPREDNISolone sodium succinate Injectable 40milliGRAM(s) IV Push every 8 hours  polyethylene glycol 3350 17Gram(s) Oral two times a day  gabapentin 300milliGRAM(s) Oral daily  enoxaparin Injectable 40milliGRAM(s) SubCutaneous every 12 hours  vancomycin  IVPB 1250milliGRAM(s) IV Intermittent every 12 hours  methadone 10milliGRAM(s) Oral every 8 hours    MEDICATIONS  (PRN):  zolpidem 5milliGRAM(s) Oral at bedtime PRN Insomnia  LORazepam     Tablet 0.5milliGRAM(s) Oral every 8 hours PRN Anxiety  acetaminophen   Tablet. 650milliGRAM(s) Oral every 6 hours PRN Moderate Pain (4 - 6)    Allergies    dust (Unknown)  latex (Unknown)  No Known Drug Allergies  Nuts (Anaphylaxis)  peanuts (Unknown)    Intolerances    PMH: CVA 1/17, anemia, G-tube feeding, DVT, MS, recurrent SBO, asthma, allergic rhinitis, back pains (on Methadone),     PSH: multiple abdominal Sxs for SBO.    FH: Unknown for autoimmune disorders.    SH: lives alone.    Vital Signs Last 24 Hrs  T(C): 36.3, Max: 36.8 (01-30 @ 06:00)  T(F): 97.4, Max: 98.2 (01-30 @ 06:00)  HR: 898 (84 - 898)  BP: 135/87 (114/78 - 135/90)  BP(mean): --  RR: 20 (14 - 20)  SpO2: 98% (94% - 99%)    PHYSICAL EXAM:      Constitutional: cachectic, in pain, has difficulties providing her history.    Eyes: clear    ENMT: PERRLA, AOMI    Neck: cupple, no JVD, no thyroid enlargement.    Respiratory: frequent productive cough; b/l rhonchi    Cardiovascular: RR/S1S2, no gallops or murmurs    Gastrointestinal: S/TD/tender to palpation    Extremities: mo edema    Skin: clear    Lymph Nodes: no cervical, inguinal or axillary adenopathy.    Musculoskeletal: No synovitis; decreased muscle strength in proximal and distal muscles, low muscle mass.                          9.6    4.1   )-----------( 427      ( 30 Jan 2017 05:53 )             30.6   WBC 2.2 on admission    30 Jan 2017 05:53    144    |  111    |  18     ----------------------------<  97     4.4     |  26     |  0.55     Ca    8.6        30 Jan 2017 05:53  Mg     2.6       30 Jan 2017 05:53    AST/ALT 66 and 74; HIV negative    CT angio: No PE but inflammatory bronchial changes with atelectases, possible small effusion.    Impression:  1. Recurrent infections bronchitis and current influenza A;   2. Recurrent SBO.  3. MS  4. History of DVT and CVA.  Will r/o APL AB.  5. LFT elevation.  6. Leukopenia and anemia.  7. Asthma.  8. R/o hypogammaglobulinemia, SCTD, MDS.    PLAN:  1. Immunology lab evaluation.   2. Steroids and ABx as per Pulmonary and ID.

## 2017-01-30 NOTE — PROGRESS NOTE ADULT - SUBJECTIVE AND OBJECTIVE BOX
OVERNIGHT EVENTS: MARCELLUS    SUBJECTIVE: NAD    VITAL SIGNS: Last 24 Hrs  T(F): 98.2, Max: 98.2 (01-30 @ 06:00)  HR: 92 (88 - 105)  BP: 131/86 (114/78 - 131/86)  RR: 16 (14 - 22)  SpO2: 94% (94% - 100%)    CAPILLARY BLOOD GLUCOSE  117 (07:52)  88 (23:10)  136 (17:59)  87 (12:02)    PHYSICAL EXAM:  Constitutional: WDWN, moderate distress due to pain  HEENT: sclera non-icteric, right scleral hemorrhage, no conjunctival pallor, dry MM  Respiratory: Right sided rhonchi, mild expiratory wheezing on the left side  Cardiovascular: RRR, normal s1/s2, no MRG  Gastrointestinal: soft, ND, +BS, diffuse abdominal tenderness, voluntary guarding, no organomegaly  Extremities: WWP, DP/radial pulses 2+ b/l, no edema  Neurological: AAOx 3, responds to commands, moves all extremities, CN 2-12 intact  Musculoskeletal: 5/5 in UE b/l, 4/5 in LE b/l    MEDICATIONS  (STANDING):  piperacillin/tazobactam IVPB.  IV Intermittent   piperacillin/tazobactam IVPB. 4.5Gram(s) IV Intermittent every 6 hours  insulin lispro (HumaLOG) corrective regimen sliding scale  SubCutaneous Before meals and at bedtime  ALBUTerol/ipratropium for Nebulization 3milliLiter(s) Nebulizer every 4 hours  cholecalciferol 1000Unit(s) Oral daily  levETIRAcetam 1000milliGRAM(s) Oral two times a day  ferrous    sulfate 325milliGRAM(s) Oral daily  oseltamivir 75milliGRAM(s) Oral two times a day  pantoprazole    Tablet 40milliGRAM(s) Oral before breakfast  buDESOnide  180 MICROgram(s) Inhaler 1Puff(s) Inhalation daily  methylPREDNISolone sodium succinate Injectable 40milliGRAM(s) IV Push every 8 hours  polyethylene glycol 3350 17Gram(s) Oral two times a day  gabapentin 300milliGRAM(s) Oral daily  enoxaparin Injectable 40milliGRAM(s) SubCutaneous every 12 hours    MEDICATIONS  (PRN):  methadone 10milliGRAM(s) Oral every 6 hours PRN pain  zolpidem 5milliGRAM(s) Oral at bedtime PRN Insomnia  LORazepam     Tablet 0.5milliGRAM(s) Oral every 8 hours PRN Anxiety  HYDROmorphone  Injectable 0.5milliGRAM(s) IV Push every 4 hours PRN Breakthrough pain      ALLERGIES: dust (Unknown)  latex (Unknown)  No Known Drug Allergies  Nuts (Anaphylaxis)  peanuts (Unknown)    LABS: ( 30 Jan 2017 05:53 )                        9.6    4.1   )-----------( 427                   30.6     144    |  111    |  18     ----------------------------<  97     4.4     |  26     |  0.55     Ca    8.6       Mg     2.6

## 2017-01-30 NOTE — PROGRESS NOTE ADULT - ASSESSMENT
60 y/o F w/ PMH of anemia, asthma, DVT, MS, multiple SBO's s/p multiple abdominal surgeries, and recent CVA, initially admitted for SOB and cough, w/ HCAP and asthma exacerbation.

## 2017-01-30 NOTE — PROGRESS NOTE ADULT - SUBJECTIVE AND OBJECTIVE BOX
· Subjective and Objective: 	  HPI:  Pt is a 61 year old female with PMH of anemia, asthma, DVT with noncompliance with AC, MS, multiple SBO's s/p multiple abdominal surgeries and recent CVA admitted to [facility] about 4 weeks ago for 4-6weeks who presented to the ED today after being sent in from Dr. Garay's office for tachycardia, low blood pressure and complaint of cough and sob.  Pt stated she has been having a cough with yellow phlegm since Last friday along with f/c/s and congestion and myalgias.  Pt stated her sister also was sick last week with similar symptoms. She denied wheezing at home or increased use of her inhalers.  Pt also stated she was admitted at [facility] about 4 weeks ago for a stroke that she had and was told she had a brain bleed but unclear if actual brain bleed as patient stated she was also taking lovenox at home.  Since her stroke about 4 weeks ago the patient has admitted to having continued lower extremity weakness and numbness in her toes up to mid shin with difficulty walking and getting out of bed.  Pt denied back pain.  She also stated she has had multiple SBO's about 6 of them and that she hasn't had a BM in about one week and has had a diffuse cramping abdominal pain for the last few days with an episode of nausea last week, but no vomiting.  She admitted to decreased PO intake at home.  In the ED patient had a CT angio to r/o PE and vital signs were Tmax: 99.0, HR: , BP 98/61, RR 18 and sO2 of 92% room air and 98% on 2L NC. (25 Jan 2017 22:05).  No s/s of bleeding noted.    Dxed with influenza and pneumonia  Subjectively better now  States admitted to [facility] recently with infection  No s/s of bleeding noted,    ROS otherwise neg    PAST MEDICAL & SURGICAL HISTORY:  CVA (cerebral vascular accident)  Anemia  G tube feedings  History of DVT (deep vein thrombosis)  MS (multiple sclerosis)  Small bowel obstruction: multiple, recurrent  Asthma  SBO (small bowel obstruction): multiple      MEDICATIONS  (STANDING):  piperacillin/tazobactam IVPB.  IV Intermittent   piperacillin/tazobactam IVPB. 4.5Gram(s) IV Intermittent every 6 hours  insulin lispro (HumaLOG) corrective regimen sliding scale  SubCutaneous Before meals and at bedtime  ALBUTerol/ipratropium for Nebulization 3milliLiter(s) Nebulizer every 4 hours  cholecalciferol 1000Unit(s) Oral daily  levETIRAcetam 1000milliGRAM(s) Oral two times a day  ferrous    sulfate 325milliGRAM(s) Oral daily  oseltamivir 75milliGRAM(s) Oral two times a day  pantoprazole    Tablet 40milliGRAM(s) Oral before breakfast  buDESOnide  180 MICROgram(s) Inhaler 1Puff(s) Inhalation daily  heparin  Injectable 5000Unit(s) SubCutaneous every 8 hours  methylPREDNISolone sodium succinate Injectable 40milliGRAM(s) IV Push every 8 hours  vancomycin  IVPB 1250milliGRAM(s) IV Intermittent every 12 hours  polyethylene glycol 3350 17Gram(s) Oral two times a day    MEDICATIONS  (PRN):  methadone 10milliGRAM(s) Oral every 6 hours PRN pain      Allergies    dust (Unknown)  latex (Unknown)  No Known Drug Allergies  Nuts (Anaphylaxis)  peanuts (Unknown)    SOCIAL HISTORY:  Lives at home  On Methadone    FAMILY HISTORY:  No pertinent family history in first degree relatives    EXAM  Vital Signs Last 24 Hrs  T(C): 36.6, Max: 36.8 (01-28 @ 20:46)  T(F): 97.8, Max: 98.2 (01-28 @ 20:46)  HR: 100 (82 - 103)  BP: 121/75 (120/77 - 127/78)  BP(mean): --  RR: 20 (17 - 22)  SpO2: 100% (96% - 100%)    Thin and frail  Awake and alert but slow speech and troubles with recall  Follows commands  Oral mucosa without thrush  RRR  Chest with poor insp effort  Abd soft ND NT  LE no edema    LABS:  Vital Signs Last 24 Hrs  T(C): 36.6, Max: 36.8 (01-28 @ 20:46)  T(F): 97.8, Max: 98.2 (01-28 @ 20:46)  HR: 100 (82 - 103)  BP: 121/75 (120/77 - 127/78)  BP(mean): --  RR: 20 (17 - 22)  SpO2: 100% (96% - 100%)                                   9.6    4.1   )-----------( 427      ( 30 Jan 2017 05:53 )             30.6           30 Jan 2017 05:53    144    |  111    |  18     ----------------------------<  97     4.4     |  26     |  0.55     Ca    8.6        30 Jan 2017 05:53  Mg     2.6       30 Jan 2017 05:53    TPro  5.7    /  Alb  x      /  TBili  x      /  DBili  x      /  AST  x      /  ALT  x      /  AlkPhos  x      30 Jan 2017 17:25          RVP positive for Influenza A3    EXAM:  CT ANGIO CHEST PE PROTOCOL IC                           PROCEDURE DATE:  01/25/2017    Quantity of Contrast in Vial in ml: 120 Contrast Used: Optiray 350  Quantity of Contrast Wasted in ml: 6       INTERPRETATION:  CT Pulmonary Angiography (CTPA) of the CHEST dated   1/25/2017 6:40 PM    INDICATION: r/o PE     TECHNIQUE: CT angiography of the pulmonary arteries was performed during   rapid bolus injection of intravenous contrast.  Post-processing including   the production of axial and coronal multiplanar reformatted images and   coronal and axial maximum intensity projections (MIPs) was performed.    PRIOR STUDY: None. In addition there is bilateral mosaic perfusion   pattern to suggest air-trapping from underlying small airway   inflammation. No consolidation or pleural collections. Linear atelectasis   and parenchymal scarring with distortion of the left major fissure focal   interrogation of the left hemidiaphragm is identified. There is a small   loculated left-sided pleuraleffusion. There is immediate adjacent   compressive atelectasis/early pneumonitis (sagittal image 38, series 8   and axial image 199, series 4).    FINDINGS:    LUNGS: There is evidence of small and large airway inflammation   characterized by bronchial wall thickening as well as scattered   tree-in-bud micronodular opacities. No pulmonary abnormalities are   evident.      MEDIASTINUM: Pulmonary arteries are of normal caliber. No  main, lobar or   segmental branch acute or chronic pulmonary embolism. No evidence of   right ventricular strain. The heart is normal in size.  No pericardial   effusion is seen. The thoracic aorta is normal in appearance. No   mediastinal, hilar or axillary lymphadenopathy is seen.    UPPER ABDOMEN, SOFT TISSUES AND BONES: Limited evaluation of the upper   abdomen demonstrates redemonstrated is an oval appearing cystic structure   demonstrating simple fluid attenuation within the subcapsular region of   segment 4A which appears slightly larger in size when compared to prior   study measuring 3.5 cm in AP dimension and 3.2 cm and prior examination   dated 2/10/2016. May be dilated tubular structure immediately anteriorly   possibly representing intrahepatic bile ductal dilatation. Surgical clips   are noted in the region of the stomach to suggest gastric sleeve and   possibly gastrojejunostomy. Osseous structures demonstrate multiple   compression fracture deformities involving L1 T8 and T9.     IMPRESSION:    1. No evidence of pulmonary artery emboli. No evidence of right   ventricular strain.  2. Findings compatible with small large airway inflammation with   bronchial wall thickening and scattered tree-in-bud micronodular pattern.   There is a mucous plugging are noted within the left lower lobe.  3. Focal consolidation/atelectasis involving the left lower lobe with a   small possible left parapneumonic effusion.  4. Linear areas of scarring and/or atelectasis in the left lower lung   zone.  5. Multiple low-attenuation intrahepatic structures possibly representing   cysts the largest of which has increased in size when compared to   2/10/2016 measuring up to 3.5 cm. There may be new and hepatic bile duct   dilatation. Further characterization of these findings with a nonemergent   hepatic ultrasound is suggested.        Assessment and Recommendation:   · Assessment		  Leukopenia/Neutropenia/Anemia  Acute Influenza A virus infection  Consolidative/lobar pneumonia  Chronic airway disease    RECOMMEND  Continue Antimicrobials as per ID  Screen for HIV / Hepatitis panel  Manual diff/blood smear  Check Iron panel/SPEP/ IPEP/ImmunofixationFlow cytometry  May require bone marrow biopsy       Attending Attestation:   I have seen and examined the patient in person.

## 2017-01-30 NOTE — PROGRESS NOTE ADULT - SUBJECTIVE AND OBJECTIVE BOX
Pt seen and examined less SOB    REVIEW OF SYSTEMS:  Constitutional: No fever, weight loss or fatigue  Cardiovascular: No chest pain, palpitations, dizziness or leg swelling  Resp: less SOB  Gastrointestinal: No abdominal or epigastric pain. No nausea, vomiting or hematemesis; No diarrhea or constipation. No melena or hematochezia.  Skin: No itching, burning, rashes or lesions       MEDICATIONS:  MEDICATIONS  (STANDING):  piperacillin/tazobactam IVPB.  IV Intermittent   piperacillin/tazobactam IVPB. 4.5Gram(s) IV Intermittent every 6 hours  insulin lispro (HumaLOG) corrective regimen sliding scale  SubCutaneous Before meals and at bedtime  ALBUTerol/ipratropium for Nebulization 3milliLiter(s) Nebulizer every 4 hours  cholecalciferol 1000Unit(s) Oral daily  levETIRAcetam 1000milliGRAM(s) Oral two times a day  ferrous    sulfate 325milliGRAM(s) Oral daily  oseltamivir 75milliGRAM(s) Oral two times a day  pantoprazole    Tablet 40milliGRAM(s) Oral before breakfast  buDESOnide  180 MICROgram(s) Inhaler 1Puff(s) Inhalation daily  methylPREDNISolone sodium succinate Injectable 40milliGRAM(s) IV Push every 8 hours  polyethylene glycol 3350 17Gram(s) Oral two times a day  gabapentin 300milliGRAM(s) Oral daily  enoxaparin Injectable 40milliGRAM(s) SubCutaneous every 12 hours  vancomycin  IVPB 1250milliGRAM(s) IV Intermittent every 12 hours  methadone 10milliGRAM(s) Oral every 8 hours    MEDICATIONS  (PRN):  zolpidem 5milliGRAM(s) Oral at bedtime PRN Insomnia  LORazepam     Tablet 0.5milliGRAM(s) Oral every 8 hours PRN Anxiety  acetaminophen   Tablet. 650milliGRAM(s) Oral every 6 hours PRN Moderate Pain (4 - 6)      Allergies    dust (Unknown)  latex (Unknown)  No Known Drug Allergies  Nuts (Anaphylaxis)  peanuts (Unknown)    Intolerances        Vital Signs Last 24 Hrs  T(C): 36.6, Max: 36.8 (01-30 @ 06:00)  T(F): 97.8, Max: 98.2 (01-30 @ 06:00)  HR: 84 (84 - 94)  BP: 135/90 (114/78 - 135/90)  BP(mean): --  RR: 16 (14 - 18)  SpO2: 98% (94% - 99%)    I & Os for current day (as of 01-30 @ 20:16)  =============================================  IN: 200 ml / OUT: 0 ml / NET: 200 ml      PHYSICAL EXAM:    General: Well developed; well nourished; in no acute distress  HEENT: MMM, conjunctiva and sclera clear  Gastrointestinal: Soft non-tender non-distended; Normal bowel sounds; No hepatosplenomegaly  Skin: Warm and dry. No obvious rash    LABS:      CBC Full  -  ( 30 Jan 2017 05:53 )  WBC Count : 4.1 K/uL  Hemoglobin : 9.6 g/dL  Hematocrit : 30.6 %  Platelet Count - Automated : 427 K/uL  Mean Cell Volume : 84.5 fL  Mean Cell Hemoglobin : 26.5 pg  Mean Cell Hemoglobin Concentration : 31.4 g/dL  Auto Neutrophil # : x  Auto Lymphocyte # : x  Auto Monocyte # : x  Auto Eosinophil # : x  Auto Basophil # : x  Auto Neutrophil % : 70.1 %  Auto Lymphocyte % : 22.1 %  Auto Monocyte % : 7.8 %  Auto Eosinophil % : x  Auto Basophil % : x    30 Jan 2017 05:53    144    |  111    |  18     ----------------------------<  97     4.4     |  26     |  0.55     Ca    8.6        30 Jan 2017 05:53  Mg     2.6       30 Jan 2017 05:53      PT/INR - ( 29 Jan 2017 17:42 )   PT: 10.6 sec;   INR: 0.96          PTT - ( 29 Jan 2017 17:42 )  PTT:28.8 sec                  RADIOLOGY & ADDITIONAL STUDIES (The following images were personally reviewed):

## 2017-01-30 NOTE — PROGRESS NOTE ADULT - SUBJECTIVE AND OBJECTIVE BOX
INTERVAL HPI/OVERNIGHT EVENTS:    + CP, midsternal  "I need dilaudid"    PAST MEDICAL & SURGICAL HISTORY:  CVA (cerebral vascular accident)  Anemia  G tube feedings  History of DVT (deep vein thrombosis)  MS (multiple sclerosis)  Small bowel obstruction: multiple, recurrent  Asthma  SBO (small bowel obstruction): multiple    FAMILY HISTORY:  No pertinent family history in first degree relatives    SOCIAL HISTORY:    REVIEW OF SYSTEMS:  no significant change    Vital Signs Last 24 Hrs  T(C): 36.6, Max: 36.8 (01-30 @ 06:00)  T(F): 97.8, Max: 98.2 (01-30 @ 06:00)  HR: 84 (84 - 105)  BP: 135/90 (114/78 - 135/90)  BP(mean): --  RR: 16 (14 - 20)  SpO2: 98% (94% - 100%)    I&O's Detail    I & Os for current day (as of 30 Jan 2017 16:44)  =============================================  IN:    Solution: 200 ml    Total IN: 200 ml  ---------------------------------------------  OUT:    Total OUT: 0 ml  ---------------------------------------------  Total NET: 200 ml    PHYSICAL EXAM:  in bed,  trying BM but not able to. "I feel congested"  Malnourished, poorly developed, uncomfortable, - acute distress; vital signs are monitored  Eyes: PERRLA, EOMI, -conjunctivitis, -scleritis   Head: no focal deficit, normocephalic,  no trauma  ENMT: moist tongue, no thrush, -nasal discharge, -hoarseness, normal hearing, -cough, -hemoptysis, trachea midline  Neck: supple, - lymphadenopathy,  -masses, -JVD  Respiratory: bilateral diminished breath sounds, +wheezing, ++rhonchi, -rales, -crackles  Chest: -accessory muscle use, -paradoxical breathing  Cardiovascular: regular rate and sinus rhythm, S1 S2 normal, -S3, -S4, -murmur, -gallop, -rub  Gastrointestinal: soft, nontender, nondistended, normal bowel sounds, no hepatosplenomegaly  Genitourinary: -flank pain, -dysuria  Extremities: -clubbing, -cyanosis, -edema    Vascular: peripheral pulses palpable 2+ bilaterally  Neurological: alert, oriented x 3, no focal deficit, -tremor   Skin: warm, dry, -erythema, iv site intact  Lymph nodes; no cervical, supraclavicular or axillary adenopathy  Psychiatric: cooperative, appropriate mood    MEDICATIONS  (STANDING):  piperacillin/tazobactam IVPB.  IV Intermittent   piperacillin/tazobactam IVPB. 4.5Gram(s) IV Intermittent every 6 hours  insulin lispro (HumaLOG) corrective regimen sliding scale  SubCutaneous Before meals and at bedtime  ALBUTerol/ipratropium for Nebulization 3milliLiter(s) Nebulizer every 4 hours  cholecalciferol 1000Unit(s) Oral daily  levETIRAcetam 1000milliGRAM(s) Oral two times a day  ferrous    sulfate 325milliGRAM(s) Oral daily  oseltamivir 75milliGRAM(s) Oral two times a day  pantoprazole    Tablet 40milliGRAM(s) Oral before breakfast  buDESOnide  180 MICROgram(s) Inhaler 1Puff(s) Inhalation daily  methylPREDNISolone sodium succinate Injectable 40milliGRAM(s) IV Push every 8 hours  polyethylene glycol 3350 17Gram(s) Oral two times a day  gabapentin 300milliGRAM(s) Oral daily  enoxaparin Injectable 40milliGRAM(s) SubCutaneous every 12 hours  vancomycin  IVPB 1250milliGRAM(s) IV Intermittent every 12 hours  methadone 10milliGRAM(s) Oral every 8 hours    MEDICATIONS  (PRN):  zolpidem 5milliGRAM(s) Oral at bedtime PRN Insomnia  LORazepam     Tablet 0.5milliGRAM(s) Oral every 8 hours PRN Anxiety  HYDROmorphone  Injectable 0.5milliGRAM(s) IV Push every 4 hours PRN Breakthrough pain  HYDROmorphone   Tablet 4milliGRAM(s) Oral every 6 hours PRN Severe Pain (7 - 10)  acetaminophen   Tablet. 650milliGRAM(s) Oral every 6 hours PRN Moderate Pain (4 - 6)    Allergies    dust (Unknown)  latex (Unknown)  No Known Drug Allergies  Nuts (Anaphylaxis)  peanuts (Unknown)    Intolerances    LABS:                        9.6    4.1   )-----------( 427      ( 30 Jan 2017 05:53 )             30.6     30 Jan 2017 05:53    144    |  111    |  18     ----------------------------<  97     4.4     |  26     |  0.55     Ca    8.6        30 Jan 2017 05:53  Mg     2.6       30 Jan 2017 05:53  PT/INR - ( 29 Jan 2017 17:42 )   PT: 10.6 sec;   INR: 0.96     PTT - ( 29 Jan 2017 17:42 )  PTT:28.8 sec    +DVT prophylaxis  +Sleep "ok"  +Nutrition goals  +Pain wants dilaudid, "stomach"  -Decubital ulcer  +GI prophylaxis (PPV, coagulopathy, Hx)  +Aspiration prophylaxis (45 degrees)    +Sedation/analgesia stopped once  +ID (phos, CH, mupi, SB)  -Delirium    +Cardiac Beta/ACEI-ARB/ASA/statin  +Prevention  +Education  +Medication reviewed (drug-drug interactions, PDA)  Medical devices    Discussed with PGY, CCRN    RADIOLOGY & ADDITIONAL STUDIES:    TECHNIQUE: CT angiography of the pulmonary arteries was performed during   rapid bolus injection of intravenous contrast.  Post-processing including   the production of axial and coronal multiplanar reformatted images and   coronal and axial maximum intensity projections (MIPs) was performed.    PRIOR STUDY: None. In addition there is bilateral mosaic perfusion   pattern to suggest air-trapping from underlying small airway   inflammation. No consolidation or pleural collections. Linear atelectasis   and parenchymal scarring with distortion of the left major fissure focal   interrogation of the left hemidiaphragm is identified. There is a small   loculated left-sided pleuraleffusion. There is immediate adjacent   compressive atelectasis/early pneumonitis (sagittal image 38, series 8   and axial image 199, series 4).    FINDINGS:    LUNGS: There is evidence of small and large airway inflammation   characterized by bronchial wall thickening as well as scattered   tree-in-bud micronodular opacities. No pulmonary abnormalities are   evident.      MEDIASTINUM: Pulmonary arteries are of normal caliber. No  main, lobar or   segmental branch acute or chronic pulmonary embolism. No evidence of   right ventricular strain. The heart is normal in size.  No pericardial   effusion is seen. The thoracic aorta is normal in appearance. No   mediastinal, hilar or axillary lymphadenopathy is seen.    UPPER ABDOMEN, SOFT TISSUES AND BONES: Limited evaluation of the upper   abdomen demonstrates redemonstrated is an oval appearing cystic structure   demonstrating simple fluid attenuation within the subcapsular region of   segment 4A which appears slightly larger in size when compared to prior   study measuring 3.5 cm in AP dimension and 3.2 cm and prior examination   dated 2/10/2016. May be dilated tubular structure immediately anteriorly   possibly representing intrahepatic bile ductal dilatation. Surgical clips   are noted in the region of the stomach to suggest gastric sleeve and   possibly gastrojejunostomy. Osseous structures demonstrate multiple   compression fracture deformities involving L1 T8 and T9.     IMPRESSION:    1. No evidence of pulmonary artery emboli. No evidence of right   ventricular strain.  2. Findings compatible with small large airway inflammation with   bronchial wall thickening and scattered tree-in-bud micronodular pattern.   There is a mucous plugging are noted within the left lower lobe.  3. Focal consolidation/atelectasis involving the left lower lobe with a   small possible left parapneumonic effusion.  4. Linear areas of scarring and/or atelectasis in the left lower lung   zone.  5. Multiple low-attenuation intrahepatic structures possibly representing   cysts the largest of which has increased in size when compared to   2/10/2016 measuring up to 3.5 cm. There may be new and hepatic bile duct   dilatation. Further characterization of these findings with a nonemergent   hepatic ultrasound is suggested.

## 2017-01-30 NOTE — PROGRESS NOTE ADULT - SUBJECTIVE AND OBJECTIVE BOX
ICU Vital Signs Last 24 Hrs  T(C): 36.6, Max: 36.8 (01-30 @ 06:00)  T(F): 97.8, Max: 98.2 (01-30 @ 06:00)  HR: 84 (84 - 105)  BP: 135/90 (114/78 - 135/90)  BP(mean): --  ABP: --  ABP(mean): --  RR: 16 (14 - 18)  SpO2: 98% (94% - 99%)    PHYSICAL EXAM:  Constitutional:Cachectic  HEENT: sclera non-icteric, right scleral hemorrhage, no conjunctival pallor, dry MM  Respiratory: Right sided rhonchi, mild expiratory wheezing on the left side  Cardiovascular: RRR, normal s1/s2, no MRG  Gastrointestinal: soft, ND, +BS, diffuse abdominal tenderness, voluntary guarding, no organomegaly  Extremities:no c/c/e  Neurological: AAOx 3,     MEDICATIONS  (STANDING):  piperacillin/tazobactam IVPB.  IV Intermittent   piperacillin/tazobactam IVPB. 4.5Gram(s) IV Intermittent every 6 hours  insulin lispro (HumaLOG) corrective regimen sliding scale  SubCutaneous Before meals and at bedtime  ALBUTerol/ipratropium for Nebulization 3milliLiter(s) Nebulizer every 4 hours  cholecalciferol 1000Unit(s) Oral daily  levETIRAcetam 1000milliGRAM(s) Oral two times a day  ferrous    sulfate 325milliGRAM(s) Oral daily  oseltamivir 75milliGRAM(s) Oral two times a day  pantoprazole    Tablet 40milliGRAM(s) Oral before breakfast  buDESOnide  180 MICROgram(s) Inhaler 1Puff(s) Inhalation daily  methylPREDNISolone sodium succinate Injectable 40milliGRAM(s) IV Push every 8 hours  polyethylene glycol 3350 17Gram(s) Oral two times a day  gabapentin 300milliGRAM(s) Oral daily  enoxaparin Injectable 40milliGRAM(s) SubCutaneous every 12 hours    MEDICATIONS  (PRN):  methadone 10milliGRAM(s) Oral every 6 hours PRN pain  zolpidem 5milliGRAM(s) Oral at bedtime PRN Insomnia  LORazepam     Tablet 0.5milliGRAM(s) Oral every 8 hours PRN Anxiety  HYDROmorphone  Injectable 0.5milliGRAM(s) IV Push every 4 hours PRN Breakthrough pain        LABS: ( 30 Jan 2017 05:53 )                        9.6    4.1   )-----------( 427                   30.6     144    |  111    |  18     ----------------------------<  97     4.4     |  26     |  0.55     Ca    8.6       Mg     2.6          EXAM:  US DPLX LWR EXT VEINS COMPL BI                           PROCEDURE DATE:  01/29/2017                 INTERPRETATION:      VENOUS DUPLEX DOPPLER OF BOTH LOWER EXTREMITIES dated 1/29/2017 1:31 PM    INDICATION: Positive Myron's sign. Rule out DVT.     TECHNIQUE: Duplex Doppler evaluation including gray-scale ultrasound   imaging, color flow Doppler imaging, and Doppler spectral analysis of the   veins of both lower extremities was performed.     COMPARISON: Ultrasound Doppler bilateral lower extremity veins dated   1/26/2017.    FINDINGS:    Thigh veins: Thrombus is seen in the left proximal deep femoral vein and   in the left popliteal vein. This is new since the prior study.    The bilateral common femoral, bilateral femoral, right popliteal,   bilateral proximal greater saphenous, and right proximal deep femoral   veins are patent and free of thrombus. The veins are normally   compressible and have normal phasic flow and augmentation response.    Calf veins: The paired peroneal and posterior tibial calf veins are   patent bilaterally.      IMPRESSION:  Deep venous thrombosis in the left proximal deep femoral and left   popliteal veins.

## 2017-01-30 NOTE — PROGRESS NOTE ADULT - SUBJECTIVE AND OBJECTIVE BOX
Neurology Follow up note    Name  MARK SNYDER    HPI:  Pt is a 61 year old female with PMH of anemia, asthma, DVT with noncompliance with AC, MS, multiple SBO's s/p multiple abdominal surgeries and recent CVA admitted to [facility] about 4 weeks ago for 4-6weeks who presented to the ED today after being sent in from Dr. Garay's office for tachycardia, low blood pressure and complaint of cough and sob.  Pt stated she has been having a cough with yellow phlegm since Last friday along with f/c/s and congestion and myalgias.  Pt stated her sister also was sick last week with similar symptoms. She denied wheezing at home or increased use of her inhalers.  Pt also stated she was admitted at [facility] about 4 weeks ago for a stroke that she had and was told she had a brain bleed but unclear if actual brain bleed as patient stated she was also taking lovenox at home.  Since her stroke about 4 weeks ago the patient has admitted to having continued lower extremity weakness and numbness in her toes up to mid shin with difficulty walking and getting out of bed.  Pt denied back pain.  She also stated she has had multiple SBO's about 6 of them and that she hasn't had a BM in about one week and has had a diffuse cramping abdominal pain for the last few days with an episode of nausea last week, but no vomiting.  She admitted to decreased PO intake at home.  In the ED patient had a CT angio to r/o PE and vital signs were Tmax: 99.0, HR: , BP 98/61, RR 18 and sO2 of 92% room air and 98% on 2L NC. (25 Jan 2017 22:05)      Interval History - no change in neuro status        REVIEW OF SYSTEMS    Vital Signs Last 24 Hrs  T(C): 36.7, Max: 36.8 (01-30 @ 06:00)  T(F): 98, Max: 98.2 (01-30 @ 06:00)  HR: 85 (85 - 105)  BP: 128/80 (114/78 - 131/86)  BP(mean): --  RR: 16 (14 - 22)  SpO2: 97% (94% - 100%)    Physical Exam-     Mental Status- awake and confused    Cranial Nerves- full lateral eye movements    Gait and station-n/a    Motor- no new focal weakness    Reflexes- decreased    Sensation- no sensory level    Coordination-no tremors    Vascular -    Medications  piperacillin/tazobactam IVPB.  IV Intermittent   piperacillin/tazobactam IVPB. 4.5Gram(s) IV Intermittent every 6 hours  insulin lispro (HumaLOG) corrective regimen sliding scale  SubCutaneous Before meals and at bedtime  ALBUTerol/ipratropium for Nebulization 3milliLiter(s) Nebulizer every 4 hours  cholecalciferol 1000Unit(s) Oral daily  levETIRAcetam 1000milliGRAM(s) Oral two times a day  methadone 10milliGRAM(s) Oral every 6 hours PRN  ferrous    sulfate 325milliGRAM(s) Oral daily  oseltamivir 75milliGRAM(s) Oral two times a day  pantoprazole    Tablet 40milliGRAM(s) Oral before breakfast  buDESOnide  180 MICROgram(s) Inhaler 1Puff(s) Inhalation daily  methylPREDNISolone sodium succinate Injectable 40milliGRAM(s) IV Push every 8 hours  polyethylene glycol 3350 17Gram(s) Oral two times a day  zolpidem 5milliGRAM(s) Oral at bedtime PRN  gabapentin 300milliGRAM(s) Oral daily  LORazepam     Tablet 0.5milliGRAM(s) Oral every 8 hours PRN  HYDROmorphone  Injectable 0.5milliGRAM(s) IV Push every 4 hours PRN  enoxaparin Injectable 40milliGRAM(s) SubCutaneous every 12 hours      Lab      Radiology    Assessment- OBS- no further neuro studies needed    Plan- as per Dr Storey

## 2017-01-31 DIAGNOSIS — G89.29 OTHER CHRONIC PAIN: ICD-10-CM

## 2017-01-31 LAB
CK SERPL-CCNC: 72 U/L — SIGNIFICANT CHANGE UP (ref 26–192)
CRP SERPL-MCNC: <0.29 MG/DL — SIGNIFICANT CHANGE UP
ERYTHROCYTE [SEDIMENTATION RATE] IN BLOOD: 5 MM/HR — SIGNIFICANT CHANGE UP
VANCOMYCIN TROUGH SERPL-MCNC: 13.5 UG/ML — SIGNIFICANT CHANGE UP (ref 10–20)

## 2017-01-31 PROCEDURE — 73523 X-RAY EXAM HIPS BI 5/> VIEWS: CPT | Mod: 26

## 2017-01-31 PROCEDURE — 99232 SBSQ HOSP IP/OBS MODERATE 35: CPT

## 2017-01-31 PROCEDURE — 72100 X-RAY EXAM L-S SPINE 2/3 VWS: CPT | Mod: 26

## 2017-01-31 RX ORDER — HYDROMORPHONE HYDROCHLORIDE 2 MG/ML
0.5 INJECTION INTRAMUSCULAR; INTRAVENOUS; SUBCUTANEOUS ONCE
Qty: 0 | Refills: 0 | Status: DISCONTINUED | OUTPATIENT
Start: 2017-01-31 | End: 2017-01-31

## 2017-01-31 RX ORDER — KETOROLAC TROMETHAMINE 30 MG/ML
15 SYRINGE (ML) INJECTION ONCE
Qty: 0 | Refills: 0 | Status: DISCONTINUED | OUTPATIENT
Start: 2017-01-31 | End: 2017-01-31

## 2017-01-31 RX ADMIN — Medication 3 MILLILITER(S): at 14:20

## 2017-01-31 RX ADMIN — Medication 3 MILLILITER(S): at 11:01

## 2017-01-31 RX ADMIN — PIPERACILLIN AND TAZOBACTAM 200 GRAM(S): 4; .5 INJECTION, POWDER, LYOPHILIZED, FOR SOLUTION INTRAVENOUS at 23:47

## 2017-01-31 RX ADMIN — Medication 3 MILLILITER(S): at 17:26

## 2017-01-31 RX ADMIN — Medication 40 MILLIGRAM(S): at 14:20

## 2017-01-31 RX ADMIN — ENOXAPARIN SODIUM 40 MILLIGRAM(S): 100 INJECTION SUBCUTANEOUS at 05:58

## 2017-01-31 RX ADMIN — METHADONE HYDROCHLORIDE 10 MILLIGRAM(S): 40 TABLET ORAL at 21:09

## 2017-01-31 RX ADMIN — GABAPENTIN 300 MILLIGRAM(S): 400 CAPSULE ORAL at 11:40

## 2017-01-31 RX ADMIN — PIPERACILLIN AND TAZOBACTAM 200 GRAM(S): 4; .5 INJECTION, POWDER, LYOPHILIZED, FOR SOLUTION INTRAVENOUS at 05:57

## 2017-01-31 RX ADMIN — Medication 1000 UNIT(S): at 11:42

## 2017-01-31 RX ADMIN — HYDROMORPHONE HYDROCHLORIDE 0.5 MILLIGRAM(S): 2 INJECTION INTRAMUSCULAR; INTRAVENOUS; SUBCUTANEOUS at 00:46

## 2017-01-31 RX ADMIN — ENOXAPARIN SODIUM 40 MILLIGRAM(S): 100 INJECTION SUBCUTANEOUS at 17:26

## 2017-01-31 RX ADMIN — LEVETIRACETAM 1000 MILLIGRAM(S): 250 TABLET, FILM COATED ORAL at 05:58

## 2017-01-31 RX ADMIN — Medication 1: at 19:05

## 2017-01-31 RX ADMIN — PIPERACILLIN AND TAZOBACTAM 200 GRAM(S): 4; .5 INJECTION, POWDER, LYOPHILIZED, FOR SOLUTION INTRAVENOUS at 18:06

## 2017-01-31 RX ADMIN — Medication 1 PUFF(S): at 11:42

## 2017-01-31 RX ADMIN — LEVETIRACETAM 1000 MILLIGRAM(S): 250 TABLET, FILM COATED ORAL at 17:26

## 2017-01-31 RX ADMIN — PANTOPRAZOLE SODIUM 40 MILLIGRAM(S): 20 TABLET, DELAYED RELEASE ORAL at 06:10

## 2017-01-31 RX ADMIN — Medication 40 MILLIGRAM(S): at 21:10

## 2017-01-31 RX ADMIN — METHADONE HYDROCHLORIDE 10 MILLIGRAM(S): 40 TABLET ORAL at 14:20

## 2017-01-31 RX ADMIN — ZOLPIDEM TARTRATE 5 MILLIGRAM(S): 10 TABLET ORAL at 23:47

## 2017-01-31 RX ADMIN — ZOLPIDEM TARTRATE 5 MILLIGRAM(S): 10 TABLET ORAL at 00:46

## 2017-01-31 RX ADMIN — HYDROMORPHONE HYDROCHLORIDE 0.5 MILLIGRAM(S): 2 INJECTION INTRAMUSCULAR; INTRAVENOUS; SUBCUTANEOUS at 11:30

## 2017-01-31 RX ADMIN — Medication 166.67 MILLIGRAM(S): at 11:39

## 2017-01-31 RX ADMIN — Medication 3 MILLILITER(S): at 05:57

## 2017-01-31 RX ADMIN — METHADONE HYDROCHLORIDE 10 MILLIGRAM(S): 40 TABLET ORAL at 06:10

## 2017-01-31 RX ADMIN — Medication 0.5 MILLIGRAM(S): at 12:15

## 2017-01-31 RX ADMIN — Medication 3 MILLILITER(S): at 21:10

## 2017-01-31 RX ADMIN — Medication 15 MILLIGRAM(S): at 16:05

## 2017-01-31 RX ADMIN — HYDROMORPHONE HYDROCHLORIDE 0.5 MILLIGRAM(S): 2 INJECTION INTRAMUSCULAR; INTRAVENOUS; SUBCUTANEOUS at 01:01

## 2017-01-31 RX ADMIN — HYDROMORPHONE HYDROCHLORIDE 0.5 MILLIGRAM(S): 2 INJECTION INTRAMUSCULAR; INTRAVENOUS; SUBCUTANEOUS at 22:57

## 2017-01-31 RX ADMIN — HYDROMORPHONE HYDROCHLORIDE 0.5 MILLIGRAM(S): 2 INJECTION INTRAMUSCULAR; INTRAVENOUS; SUBCUTANEOUS at 11:06

## 2017-01-31 RX ADMIN — POLYETHYLENE GLYCOL 3350 17 GRAM(S): 17 POWDER, FOR SOLUTION ORAL at 17:26

## 2017-01-31 RX ADMIN — HYDROMORPHONE HYDROCHLORIDE 0.5 MILLIGRAM(S): 2 INJECTION INTRAMUSCULAR; INTRAVENOUS; SUBCUTANEOUS at 22:42

## 2017-01-31 RX ADMIN — Medication 325 MILLIGRAM(S): at 11:40

## 2017-01-31 RX ADMIN — Medication 75 MILLIGRAM(S): at 05:58

## 2017-01-31 RX ADMIN — PIPERACILLIN AND TAZOBACTAM 200 GRAM(S): 4; .5 INJECTION, POWDER, LYOPHILIZED, FOR SOLUTION INTRAVENOUS at 13:01

## 2017-01-31 RX ADMIN — Medication 15 MILLIGRAM(S): at 15:35

## 2017-01-31 RX ADMIN — Medication 40 MILLIGRAM(S): at 05:57

## 2017-01-31 NOTE — PROGRESS NOTE ADULT - SUBJECTIVE AND OBJECTIVE BOX
Neurology Follow up note    Name  MARK SNYDER    HPI:  Pt is a 61 year old female with PMH of anemia, asthma, DVT with noncompliance with AC, MS, multiple SBO's s/p multiple abdominal surgeries and recent CVA admitted to [facility] about 4 weeks ago for 4-6weeks who presented to the ED today after being sent in from Dr. Garay's office for tachycardia, low blood pressure and complaint of cough and sob.  Pt stated she has been having a cough with yellow phlegm since Last friday along with f/c/s and congestion and myalgias.  Pt stated her sister also was sick last week with similar symptoms. She denied wheezing at home or increased use of her inhalers.  Pt also stated she was admitted at [facility] about 4 weeks ago for a stroke that she had and was told she had a brain bleed but unclear if actual brain bleed as patient stated she was also taking lovenox at home.  Since her stroke about 4 weeks ago the patient has admitted to having continued lower extremity weakness and numbness in her toes up to mid shin with difficulty walking and getting out of bed.  Pt denied back pain.  She also stated she has had multiple SBO's about 6 of them and that she hasn't had a BM in about one week and has had a diffuse cramping abdominal pain for the last few days with an episode of nausea last week, but no vomiting.  She admitted to decreased PO intake at home.  In the ED patient had a CT angio to r/o PE and vital signs were Tmax: 99.0, HR: , BP 98/61, RR 18 and sO2 of 92% room air and 98% on 2L NC. (25 Jan 2017 22:05)      Interval History - no change in mental status- intermittent lethargy        REVIEW OF SYSTEMS    Vital Signs Last 24 Hrs  T(C): 36.6, Max: 36.7 (01-30 @ 09:38)  T(F): 97.8, Max: 98 (01-30 @ 09:38)  HR: 91 (84 - 91)  BP: 136/77 (128/80 - 136/77)  BP(mean): --  RR: 20 (16 - 20)  SpO2: 98% (95% - 98%)    Physical Exam-     Mental Status- lethargic    Cranial Nerves- no diplopia    Gait and station-n/a    Motor- moves all 4 extremities     Reflexes- decreased    Sensation- n/a    Coordination- no tremors    Vascular -    Medications  piperacillin/tazobactam IVPB.  IV Intermittent   piperacillin/tazobactam IVPB. 4.5Gram(s) IV Intermittent every 6 hours  insulin lispro (HumaLOG) corrective regimen sliding scale  SubCutaneous Before meals and at bedtime  ALBUTerol/ipratropium for Nebulization 3milliLiter(s) Nebulizer every 4 hours  cholecalciferol 1000Unit(s) Oral daily  levETIRAcetam 1000milliGRAM(s) Oral two times a day  ferrous    sulfate 325milliGRAM(s) Oral daily  pantoprazole    Tablet 40milliGRAM(s) Oral before breakfast  buDESOnide  180 MICROgram(s) Inhaler 1Puff(s) Inhalation daily  methylPREDNISolone sodium succinate Injectable 40milliGRAM(s) IV Push every 8 hours  polyethylene glycol 3350 17Gram(s) Oral two times a day  zolpidem 5milliGRAM(s) Oral at bedtime PRN  gabapentin 300milliGRAM(s) Oral daily  LORazepam     Tablet 0.5milliGRAM(s) Oral every 8 hours PRN  enoxaparin Injectable 40milliGRAM(s) SubCutaneous every 12 hours  vancomycin  IVPB 1250milliGRAM(s) IV Intermittent every 12 hours  methadone 10milliGRAM(s) Oral every 8 hours  acetaminophen   Tablet. 650milliGRAM(s) Oral every 6 hours PRN      Lab      Radiology    Assessment- OBS- no acute neuro events    Plan- supportive care

## 2017-01-31 NOTE — PROGRESS NOTE ADULT - SUBJECTIVE AND OBJECTIVE BOX
· Subjective and Objective: 	  HPI:  Pt is a 61 year old female with PMH of anemia, asthma, DVT with noncompliance with AC, MS, multiple SBO's s/p multiple abdominal surgeries and recent CVA admitted to [facility] about 4 weeks ago for 4-6weeks who presented to the ED today after being sent in from Dr. Garay's office for tachycardia, low blood pressure and complaint of cough and sob.  Pt stated she has been having a cough with yellow phlegm since Last friday along with f/c/s and congestion and myalgias.  Pt stated her sister also was sick last week with similar symptoms. She denied wheezing at home or increased use of her inhalers.  Pt also stated she was admitted at [facility] about 4 weeks ago for a stroke that she had and was told she had a brain bleed but unclear if actual brain bleed as patient stated she was also taking lovenox at home.  Since her stroke about 4 weeks ago the patient has admitted to having continued lower extremity weakness and numbness in her toes up to mid shin with difficulty walking and getting out of bed.  Pt denied back pain.  She also stated she has had multiple SBO's about 6 of them and that she hasn't had a BM in about one week and has had a diffuse cramping abdominal pain for the last few days with an episode of nausea last week, but no vomiting.  She admitted to decreased PO intake at home.  In the ED patient had a CT angio to r/o PE and vital signs were Tmax: 99.0, HR: , BP 98/61, RR 18 and sO2 of 92% room air and 98% on 2L NC. (25 Jan 2017 22:05).  No s/s of bleeding noted.    Dxed with influenza and pneumonia  Subjectively better now  States admitted to [facility] recently with infection  No s/s of bleeding noted,    ROS otherwise neg    PAST MEDICAL & SURGICAL HISTORY:  CVA (cerebral vascular accident)  Anemia  G tube feedings  History of DVT (deep vein thrombosis)  MS (multiple sclerosis)  Small bowel obstruction: multiple, recurrent  Asthma  SBO (small bowel obstruction): multiple      MEDICATIONS  (STANDING):  piperacillin/tazobactam IVPB.  IV Intermittent   piperacillin/tazobactam IVPB. 4.5Gram(s) IV Intermittent every 6 hours  insulin lispro (HumaLOG) corrective regimen sliding scale  SubCutaneous Before meals and at bedtime  ALBUTerol/ipratropium for Nebulization 3milliLiter(s) Nebulizer every 4 hours  cholecalciferol 1000Unit(s) Oral daily  levETIRAcetam 1000milliGRAM(s) Oral two times a day  ferrous    sulfate 325milliGRAM(s) Oral daily  oseltamivir 75milliGRAM(s) Oral two times a day  pantoprazole    Tablet 40milliGRAM(s) Oral before breakfast  buDESOnide  180 MICROgram(s) Inhaler 1Puff(s) Inhalation daily  heparin  Injectable 5000Unit(s) SubCutaneous every 8 hours  methylPREDNISolone sodium succinate Injectable 40milliGRAM(s) IV Push every 8 hours  vancomycin  IVPB 1250milliGRAM(s) IV Intermittent every 12 hours  polyethylene glycol 3350 17Gram(s) Oral two times a day    MEDICATIONS  (PRN):  methadone 10milliGRAM(s) Oral every 6 hours PRN pain      Allergies    dust (Unknown)  latex (Unknown)  No Known Drug Allergies  Nuts (Anaphylaxis)  peanuts (Unknown)    SOCIAL HISTORY:  Lives at home  On Methadone    FAMILY HISTORY:  No pertinent family history in first degree relatives    EXAM  Vital Signs Last 24 Hrs  T(C): 36.6, Max: 36.8 (01-28 @ 20:46)  T(F): 97.8, Max: 98.2 (01-28 @ 20:46)  HR: 100 (82 - 103)  BP: 121/75 (120/77 - 127/78)  BP(mean): --  RR: 20 (17 - 22)  SpO2: 100% (96% - 100%)    Thin and frail  Awake and alert but slow speech and troubles with recall  Follows commands  Oral mucosa without thrush  RRR  Chest with poor insp effort  Abd soft ND NT  LE no edema    LABS:  Vital Signs Last 24 Hrs  T(C): 36.6, Max: 36.8 (01-28 @ 20:46)  T(F): 97.8, Max: 98.2 (01-28 @ 20:46)  HR: 100 (82 - 103)  BP: 121/75 (120/77 - 127/78)  BP(mean): --  RR: 20 (17 - 22)  SpO2: 100% (96% - 100%)                                   9.6    4.1   )-----------( 427      ( 30 Jan 2017 05:53 )             30.6           30 Jan 2017 05:53    144    |  111    |  18     ----------------------------<  97     4.4     |  26     |  0.55     Ca    8.6        30 Jan 2017 05:53  Mg     2.6       30 Jan 2017 05:53    TPro  5.7    /  Alb  x      /  TBili  x      /  DBili  x      /  AST  x      /  ALT  x      /  AlkPhos  x      30 Jan 2017 17:25          RVP positive for Influenza A3    EXAM:  CT ANGIO CHEST PE PROTOCOL IC                           PROCEDURE DATE:  01/25/2017    Quantity of Contrast in Vial in ml: 120 Contrast Used: Optiray 350  Quantity of Contrast Wasted in ml: 6       INTERPRETATION:  CT Pulmonary Angiography (CTPA) of the CHEST dated   1/25/2017 6:40 PM    INDICATION: r/o PE     TECHNIQUE: CT angiography of the pulmonary arteries was performed during   rapid bolus injection of intravenous contrast.  Post-processing including   the production of axial and coronal multiplanar reformatted images and   coronal and axial maximum intensity projections (MIPs) was performed.    PRIOR STUDY: None. In addition there is bilateral mosaic perfusion   pattern to suggest air-trapping from underlying small airway   inflammation. No consolidation or pleural collections. Linear atelectasis   and parenchymal scarring with distortion of the left major fissure focal   interrogation of the left hemidiaphragm is identified. There is a small   loculated left-sided pleuraleffusion. There is immediate adjacent   compressive atelectasis/early pneumonitis (sagittal image 38, series 8   and axial image 199, series 4).    FINDINGS:    LUNGS: There is evidence of small and large airway inflammation   characterized by bronchial wall thickening as well as scattered   tree-in-bud micronodular opacities. No pulmonary abnormalities are   evident.      MEDIASTINUM: Pulmonary arteries are of normal caliber. No  main, lobar or   segmental branch acute or chronic pulmonary embolism. No evidence of   right ventricular strain. The heart is normal in size.  No pericardial   effusion is seen. The thoracic aorta is normal in appearance. No   mediastinal, hilar or axillary lymphadenopathy is seen.    UPPER ABDOMEN, SOFT TISSUES AND BONES: Limited evaluation of the upper   abdomen demonstrates redemonstrated is an oval appearing cystic structure   demonstrating simple fluid attenuation within the subcapsular region of   segment 4A which appears slightly larger in size when compared to prior   study measuring 3.5 cm in AP dimension and 3.2 cm and prior examination   dated 2/10/2016. May be dilated tubular structure immediately anteriorly   possibly representing intrahepatic bile ductal dilatation. Surgical clips   are noted in the region of the stomach to suggest gastric sleeve and   possibly gastrojejunostomy. Osseous structures demonstrate multiple   compression fracture deformities involving L1 T8 and T9.     IMPRESSION:    1. No evidence of pulmonary artery emboli. No evidence of right   ventricular strain.  2. Findings compatible with small large airway inflammation with   bronchial wall thickening and scattered tree-in-bud micronodular pattern.   There is a mucous plugging are noted within the left lower lobe.  3. Focal consolidation/atelectasis involving the left lower lobe with a   small possible left parapneumonic effusion.  4. Linear areas of scarring and/or atelectasis in the left lower lung   zone.  5. Multiple low-attenuation intrahepatic structures possibly representing   cysts the largest of which has increased in size when compared to   2/10/2016 measuring up to 3.5 cm. There may be new and hepatic bile duct   dilatation. Further characterization of these findings with a nonemergent   hepatic ultrasound is suggested.        Assessment and Recommendation:   · Assessment		  Leukopenia/Neutropenia/Anemia  Acute Influenza A virus infection  Consolidative/lobar pneumonia  Chronic airway disease    RECOMMEND  Continue Antimicrobials as per ID  Screen for HIV / Hepatitis panel  Manual diff/blood smear  Check Iron panel/SPEP/ IPEP/ImmunofixationFlow cytometry  Await bone marrow biopsy   DVT ppx - sq Lovenox

## 2017-01-31 NOTE — PROGRESS NOTE ADULT - ASSESSMENT
62 y/o F w/ PMH of anemia, asthma, DVT, MS, multiple SBO's s/p multiple abdominal surgeries, and recent CVA, initially admitted for SOB and cough, w/ HCAP, asthma exacerbation, RLE DVT, and leukopenia awaiting BM biopsy tomorrow.

## 2017-01-31 NOTE — PROGRESS NOTE ADULT - SUBJECTIVE AND OBJECTIVE BOX
Pt seen and examined  seems better    REVIEW OF SYSTEMS:  Constitutional: No fever,   Cardiovascular: No chest pain, palpitations, dizziness or leg swelling  Gastrointestinal: No abdominal or epigastric pain. No nausea, vomiting or hematemesis; No diarrhea or constipation. No melena or hematochezia.  Skin: No itching, burning, rashes or lesions       MEDICATIONS:  MEDICATIONS  (STANDING):  piperacillin/tazobactam IVPB.  IV Intermittent   piperacillin/tazobactam IVPB. 4.5Gram(s) IV Intermittent every 6 hours  insulin lispro (HumaLOG) corrective regimen sliding scale  SubCutaneous Before meals and at bedtime  ALBUTerol/ipratropium for Nebulization 3milliLiter(s) Nebulizer every 4 hours  cholecalciferol 1000Unit(s) Oral daily  levETIRAcetam 1000milliGRAM(s) Oral two times a day  ferrous    sulfate 325milliGRAM(s) Oral daily  pantoprazole    Tablet 40milliGRAM(s) Oral before breakfast  buDESOnide  180 MICROgram(s) Inhaler 1Puff(s) Inhalation daily  methylPREDNISolone sodium succinate Injectable 40milliGRAM(s) IV Push every 8 hours  polyethylene glycol 3350 17Gram(s) Oral two times a day  gabapentin 300milliGRAM(s) Oral daily  enoxaparin Injectable 40milliGRAM(s) SubCutaneous every 12 hours  vancomycin  IVPB 1250milliGRAM(s) IV Intermittent every 12 hours  methadone 10milliGRAM(s) Oral every 8 hours  HYDROmorphone  Injectable 0.5milliGRAM(s) IV Push once    MEDICATIONS  (PRN):  zolpidem 5milliGRAM(s) Oral at bedtime PRN Insomnia  LORazepam     Tablet 0.5milliGRAM(s) Oral every 8 hours PRN Anxiety  acetaminophen   Tablet. 650milliGRAM(s) Oral every 6 hours PRN Moderate Pain (4 - 6)      Allergies    dust (Unknown)  latex (Unknown)  No Known Drug Allergies  Nuts (Anaphylaxis)  peanuts (Unknown)    Intolerances        Vital Signs Last 24 Hrs  T(C): 36.7, Max: 36.7 (01-31 @ 09:31)  T(F): 98, Max: 98 (01-31 @ 09:31)  HR: 81 (81 - 91)  BP: 108/79 (108/79 - 136/77)  BP(mean): --  RR: 16 (16 - 20)  SpO2: 96% (96% - 98%)    I & Os for current day (as of 01-31 @ 10:02)  =============================================  IN: 200 ml / OUT: 0 ml / NET: 200 ml      PHYSICAL EXAM:    General: Well developed; well nourished; in no acute distress  HEENT: MMM, conjunctiva and sclera clear  Gastrointestinal: Soft non-tender non-distended; Normal bowel sounds; No hepatosplenomegaly  Skin: Warm and dry. No obvious rash    LABS:      CBC Full  -  ( 30 Jan 2017 05:53 )  WBC Count : 4.1 K/uL  Hemoglobin : 9.6 g/dL  Hematocrit : 30.6 %  Platelet Count - Automated : 427 K/uL  Mean Cell Volume : 84.5 fL  Mean Cell Hemoglobin : 26.5 pg  Mean Cell Hemoglobin Concentration : 31.4 g/dL  Auto Neutrophil # : x  Auto Lymphocyte # : x  Auto Monocyte # : x  Auto Eosinophil # : x  Auto Basophil # : x  Auto Neutrophil % : 70.1 %  Auto Lymphocyte % : 22.1 %  Auto Monocyte % : 7.8 %  Auto Eosinophil % : x  Auto Basophil % : x    30 Jan 2017 05:53    144    |  111    |  18     ----------------------------<  97     4.4     |  26     |  0.55     Ca    8.6        30 Jan 2017 05:53  Mg     2.6       30 Jan 2017 05:53    TPro  5.7    /  Alb  x      /  TBili  x      /  DBili  x      /  AST  x      /  ALT  x      /  AlkPhos  x      30 Jan 2017 17:25    PT/INR - ( 29 Jan 2017 17:42 )   PT: 10.6 sec;   INR: 0.96          PTT - ( 29 Jan 2017 17:42 )  PTT:28.8 sec                  RADIOLOGY & ADDITIONAL STUDIES (The following images were personally reviewed):

## 2017-01-31 NOTE — PROGRESS NOTE ADULT - SUBJECTIVE AND OBJECTIVE BOX
Patient is a 61 year old  female with a history of Anemia, Asthma, DVT with noncompliance with AC, history of Multiple Scelerosis, multiple small bowel obstuctions with chronic abdominal pain now on Methadone and Dilaudid for pain control and recent CVA with admission to [facility] 1 month ago and who was admitted to [facility] on 1/25/17 with cough, shortness of breath and increasing weakness. Following admission patient treated for pneumonia and chronic pain.      Patient is a retired  and lives at home with her mother and her brother.     Today patient reports feeling a little better, feels stronger, and is more engagable. Is complaining less of pain. Continuing Supportive therapy.

## 2017-01-31 NOTE — PROGRESS NOTE ADULT - SUBJECTIVE AND OBJECTIVE BOX
OVERNIGHT EVENTS: NPO after midnight for ADRYAN today.    SUBJECTIVE: Pt complains of crampy leg pain.     VITAL SIGNS: Last 24 Hrs  T(F): 98, Max: 98 (01-31 @ 09:31)  HR: 81 (81 - 91)  BP: 108/79 (108/79 - 136/77)  RR: 16 (16 - 20)  SpO2: 96% (96% - 98%)    CAPILLARY BLOOD GLUCOSE  79 (11:16)  105 (07:39)  135 (21:50)    PHYSICAL EXAM:  Constitutional: WDWN, moderate distress due to pain  HEENT: sclera non-icteric, right scleral hemorrhage, no conjunctival pallor, dry MM  Respiratory: bibasilar crackles, no wheezes, no rhonchi  Cardiovascular: RRR, normal s1/s2, no MRG  Gastrointestinal: soft, ND, +BS, diffuse abdominal tenderness, voluntary guarding, no organomegaly  Extremities: WWP, DP/radial pulses 2+ b/l, no edema  Neurological: AAOx 3, responds to commands, moves all extremities, CN 2-12 intact    MEDICATIONS  (STANDING):  piperacillin/tazobactam IVPB. 4.5Gram(s) IV Intermittent every 6 hours  vancomycin  IVPB 1250milliGRAM(s) IV Intermittent every 12 hours  buDESOnide  180 MICROgram(s) Inhaler 1Puff(s) Inhalation daily  methylPREDNISolone sodium succinate Injectable 40milliGRAM(s) IV Push every 8 hours  ALBUTerol/ipratropium for Nebulization 3milliLiter(s) Nebulizer every 4 hours  pantoprazole    Tablet 40milliGRAM(s) Oral before breakfast  enoxaparin Injectable 40milliGRAM(s) SubCutaneous every 12 hours  cholecalciferol 1000Unit(s) Oral daily  levETIRAcetam 1000milliGRAM(s) Oral two times a day  ferrous    sulfate 325milliGRAM(s) Oral daily  polyethylene glycol 3350 17Gram(s) Oral two times a day  gabapentin 300milliGRAM(s) Oral daily  methadone 10milliGRAM(s) Oral every 8 hours  insulin lispro (HumaLOG) corrective regimen sliding scale  SubCutaneous Before meals and at bedtime    MEDICATIONS  (PRN):  zolpidem 5milliGRAM(s) Oral at bedtime PRN Insomnia  LORazepam     Tablet 0.5milliGRAM(s) Oral every 8 hours PRN Anxiety  acetaminophen   Tablet. 650milliGRAM(s) Oral every 6 hours PRN Moderate Pain (4 - 6)    ALLERGIES: dust (Unknown)  latex (Unknown)  No Known Drug Allergies  Nuts (Anaphylaxis)  peanuts (Unknown)    LABS: Pending

## 2017-01-31 NOTE — CHART NOTE - NSCHARTNOTEFT_GEN_A_CORE
PGY notified around 3pm today that the pt had a mechanical fall on her back, no LOC, no head trauma. On exam, pt's VSS, pt w/ mild lumbar spinal and b/l hip tenderness. XR of b/l hip and thoracolumbar spine ordered.

## 2017-01-31 NOTE — CHART NOTE - NSCHARTNOTEFT_GEN_A_CORE
PGY-1 Event Note PGY-1 Event Note  Paged to the bedside for patient stating that she is out of breath. Patient seen and examined at bedside. Patient appears in mild distress, stating that she is having abdominal pain.      Vitals were: T 99, /78, , R18, SpO2 100% with nebulizer treatment in place.   Cardio: tachycardic, no murmurs  Pulm: b/l basilar crackles  Abdomen, +BS, tympanitic, soft    EKG perfrmed and shows sinus tachycardia.   Suspect SOB 2/2 abdominal pain, will give patient her methadone, which she was due for, as well as Mylanta for abdominal pain.   Will continue to monitor.

## 2017-01-31 NOTE — PROGRESS NOTE ADULT - SUBJECTIVE AND OBJECTIVE BOX
INTERVAL HPI: sob improving no cp no palp  	  MEDICATIONS:    piperacillin/tazobactam IVPB.  IV Intermittent   piperacillin/tazobactam IVPB. 4.5Gram(s) IV Intermittent every 6 hours  vancomycin  IVPB 1250milliGRAM(s) IV Intermittent every 12 hours    ALBUTerol/ipratropium for Nebulization 3milliLiter(s) Nebulizer every 4 hours  buDESOnide  180 MICROgram(s) Inhaler 1Puff(s) Inhalation daily    levETIRAcetam 1000milliGRAM(s) Oral two times a day  zolpidem 5milliGRAM(s) Oral at bedtime PRN  gabapentin 300milliGRAM(s) Oral daily  LORazepam     Tablet 0.5milliGRAM(s) Oral every 8 hours PRN  methadone 10milliGRAM(s) Oral every 8 hours  acetaminophen   Tablet. 650milliGRAM(s) Oral every 6 hours PRN    pantoprazole    Tablet 40milliGRAM(s) Oral before breakfast  polyethylene glycol 3350 17Gram(s) Oral two times a day    insulin lispro (HumaLOG) corrective regimen sliding scale  SubCutaneous Before meals and at bedtime  methylPREDNISolone sodium succinate Injectable 40milliGRAM(s) IV Push every 8 hours    cholecalciferol 1000Unit(s) Oral daily  ferrous    sulfate 325milliGRAM(s) Oral daily  enoxaparin Injectable 40milliGRAM(s) SubCutaneous every 12 hours      REVIEW OF SYSTEMS:  [x] As per HPI  CONSTITUTIONAL: No fever, weight loss, or fatigue  RESPIRATORY: No cough, wheezing, chills or hemoptysis; No Shortness of Breath  CARDIOVASCULAR: No chest pain, palpitations, dizziness, or leg swelling  GASTROINTESTINAL: No abdominal or epigastric pain. No nausea, vomiting, or hematemesis; No diarrhea or constipation. No melena or hematochezia.  MUSCULOSKELETAL: No joint pain or swelling; No muscle, back, or extremity pain  [x] All others negative	  [ ] Unable to obtain    PHYSICAL EXAM:  T(C): 36.7, Max: 36.7 (01-31 @ 09:31)  HR: 81 (81 - 91)  BP: 108/79 (108/79 - 136/77)  RR: 16 (16 - 20)  SpO2: 96% (96% - 98%)  Wt(kg): --  I&O's Summary    I & Os for current day (as of 31 Jan 2017 11:55)  =============================================  IN: 200 ml / OUT: 0 ml / NET: 200 ml        Appearance: Normal	  HEENT:   Normal oral mucosa  Cardiovascular: Normal S1 S2, No JVD, No murmurs, No edema  Respiratory: Lungs clear to auscultation	  Gastrointestinal:  Soft, Non-tender, + BS	  Extremities: Normal range of motion, No clubbing, cyanosis or edema  Vascular: Peripheral pulses palpable 2+ bilaterally    TELEMETRY: 	    ECG:    	  RADIOLOGY:   CXR:  CT:  US:    CARDIAC TESTING:  Echocardiogram:  Catheterization:  Stress Test:      LABS:	 	    CARDIAC MARKERS:                                  9.6    4.1   )-----------( 427      ( 30 Jan 2017 05:53 )             30.6     30 Jan 2017 05:53    144    |  111    |  18     ----------------------------<  97     4.4     |  26     |  0.55     Ca    8.6        30 Jan 2017 05:53  Mg     2.6       30 Jan 2017 05:53    TPro  5.7    /  Alb  x      /  TBili  x      /  DBili  x      /  AST  x      /  ALT  x      /  AlkPhos  x      30 Jan 2017 17:25    proBNP:   Lipid Profile:   HgA1c:   TSH:     ASSESSMENT/PLAN: 	  Echo - unusuale AO mobile mass - ?etiology of CVA would recommend ADRYAN to clarify mass  HTN - at goal, observe  SOB - mucous plugging no PE  Hx DVT - dopplers neg  replete electrolytes

## 2017-01-31 NOTE — PROGRESS NOTE ADULT - SUBJECTIVE AND OBJECTIVE BOX
NPO after midnight for ADRYAN today.  PT S&E@BS in AM    SUBJECTIVE: Pt complains of crampy leg pain.     CAPILLARY BLOOD GLUCOSE  79 (11:16)  105 (07:39)  135 (21:50)    PHYSICAL EXAM:  Constitutional: WDWN, moderate distress due to pain  HEENT: sclera non-icteric, right scleral hemorrhage, no conjunctival pallor, dry MM  Respiratory: bibasilar crackles, no wheezes, no rhonchi  Cardiovascular: RRR, normal s1/s2, no MRG  Gastrointestinal: soft, ND, +BS, diffuse abdominal tenderness, voluntary guarding, no organomegaly  Extremities: WWP, DP/radial pulses 2+ b/l, no edema  Neurological: AAOx 3, responds to commands, moves all extremities, CN 2-12 intact    MEDICATIONS  (STANDING):  piperacillin/tazobactam IVPB. 4.5Gram(s) IV Intermittent every 6 hours  vancomycin  IVPB 1250milliGRAM(s) IV Intermittent every 12 hours  buDESOnide  180 MICROgram(s) Inhaler 1Puff(s) Inhalation daily  methylPREDNISolone sodium succinate Injectable 40milliGRAM(s) IV Push every 8 hours  ALBUTerol/ipratropium for Nebulization 3milliLiter(s) Nebulizer every 4 hours  pantoprazole    Tablet 40milliGRAM(s) Oral before breakfast  enoxaparin Injectable 40milliGRAM(s) SubCutaneous every 12 hours  cholecalciferol 1000Unit(s) Oral daily  levETIRAcetam 1000milliGRAM(s) Oral two times a day  ferrous    sulfate 325milliGRAM(s) Oral daily  polyethylene glycol 3350 17Gram(s) Oral two times a day  gabapentin 300milliGRAM(s) Oral daily  methadone 10milliGRAM(s) Oral every 8 hours  insulin lispro (HumaLOG) corrective regimen sliding scale  SubCutaneous Before meals and at bedtime    MEDICATIONS  (PRN):  zolpidem 5milliGRAM(s) Oral at bedtime PRN Insomnia  LORazepam     Tablet 0.5milliGRAM(s) Oral every 8 hours PRN Anxiety  acetaminophen   Tablet. 650milliGRAM(s) Oral every 6 hours PRN Moderate Pain (4 - 6)    ALLERGIES: dust (Unknown)  latex (Unknown)  No Known Drug Allergies  Nuts (Anaphylaxis)  peanuts (Unknown)

## 2017-01-31 NOTE — PROGRESS NOTE ADULT - SUBJECTIVE AND OBJECTIVE BOX
CC/ HPI Patient is a 61y old  Female who presents with a chief complaint of shortness of breath (26 Jan 2017 02:21)      PAST MEDICAL & SURGICAL HISTORY:  CVA (cerebral vascular accident)  Anemia  G tube feedings  History of DVT (deep vein thrombosis)  MS (multiple sclerosis)  Small bowel obstruction: multiple, recurrent  Asthma  SBO (small bowel obstruction): multiple    SOCHX:   tobacco,  -  alcohol    FMHX: FA/MO  - contributory     ROS reviewed below with positive findings marked (+) :  GEN:  fever, chills ENT: tracheostomy,   epistaxis,  sinusitis COR: CAD, CHF,  HTN, dysrhythmia PUL: COPD, ILD, asthma, pneumonia GI: PEG, dysphagia, hemorrhage, other BRENDA: kidney disease, electrolyte disorder HEM:  anemia, thrombus, coagulopathy, cancer ENDO:  thyroid disease, diabetes mellitus CNS:  dementia, stroke, seizure, PSY:  depression, anxiety, other      MEDICATIONS  (STANDING):  piperacillin/tazobactam IVPB.  IV Intermittent   piperacillin/tazobactam IVPB. 4.5Gram(s) IV Intermittent every 6 hours  insulin lispro (HumaLOG) corrective regimen sliding scale  SubCutaneous Before meals and at bedtime  ALBUTerol/ipratropium for Nebulization 3milliLiter(s) Nebulizer every 4 hours  cholecalciferol 1000Unit(s) Oral daily  levETIRAcetam 1000milliGRAM(s) Oral two times a day  ferrous    sulfate 325milliGRAM(s) Oral daily  pantoprazole    Tablet 40milliGRAM(s) Oral before breakfast  buDESOnide  180 MICROgram(s) Inhaler 1Puff(s) Inhalation daily  methylPREDNISolone sodium succinate Injectable 40milliGRAM(s) IV Push every 8 hours  polyethylene glycol 3350 17Gram(s) Oral two times a day  gabapentin 300milliGRAM(s) Oral daily  enoxaparin Injectable 40milliGRAM(s) SubCutaneous every 12 hours  vancomycin  IVPB 1250milliGRAM(s) IV Intermittent every 12 hours  methadone 10milliGRAM(s) Oral every 8 hours  HYDROmorphone  Injectable 0.5milliGRAM(s) IV Push once    MEDICATIONS  (PRN):  zolpidem 5milliGRAM(s) Oral at bedtime PRN Insomnia  LORazepam     Tablet 0.5milliGRAM(s) Oral every 8 hours PRN Anxiety  acetaminophen   Tablet. 650milliGRAM(s) Oral every 6 hours PRN Moderate Pain (4 - 6)      Vital Signs Last 24 Hrs  T(C): 36.6, Max: 36.7 (01-30 @ 09:38)  T(F): 97.8, Max: 98 (01-30 @ 09:38)  HR: 91 (84 - 91)  BP: 136/77 (128/80 - 136/77)  BP(mean): --  RR: 20 (16 - 20)  SpO2: 98% (97% - 98%)    GENERAL:         comfortable,  - distress.  HEENT:            - trauma,  - icterus,  - injection,  - nasal discharge.  NECK:              - jugular venous distention, - thyromegaly.  LYMPH:           - lymphadenopathy, - masses.  RESP:               - clear,   - rales,   - rhonchi,   - wheezes.   COR:                S1S2 RRR  - murmurs,  - gallops,  - rubs.  ABD:                bowel sounds,   soft, - tender, - distended, - organomegaly.  EXT/MSC:         - cyanosis,  - clubbing,  - edema.    NEURO:             alert,   responds to stimuli.                            9.6    4.1   )-----------( 427      ( 30 Jan 2017 05:53 )             30.6     30 Jan 2017 05:53    144    |  111    |  18     ----------------------------<  97     4.4     |  26     |  0.55     Ca    8.6        30 Jan 2017 05:53  Mg     2.6       30 Jan 2017 05:53    TPro  5.7    /  Alb  x      /  TBili  x      /  DBili  x      /  AST  x      /  ALT  x      /  AlkPhos  x      30 Jan 2017 17:25    ASSESSMENT/PLAN    1)  2)  3)  4)      Bronchodilators:  Atrovent/ albuterol q 4 – 6 hours as needed  Corticosteroids:  ID/Antibiotics:  Cardiac/HTN:    GI: Rx/ prophylaxis c PPI/H2B  Heme: Rx/VT prophylaxis c SQH/SCD/AC  Discuss with medical team CC/ HPI 60 y/o female admitted with respiratory insufficiency, AH3 influenza, LLL pneumonia , multiple sclerosis, asthma, resting comfortably on exam    PAST MEDICAL & SURGICAL HISTORY:  CVA (cerebral vascular accident)  Anemia  G tube feedings  History of DVT (deep vein thrombosis)  MS (multiple sclerosis)  Small bowel obstruction: multiple, recurrent  Asthma  SBO (small bowel obstruction): multiple    SOCHX:  NA    FMHX: FA/MO  - contributory     ROS reviewed below with positive findings marked (+) :  GEN:  fever, chills ENT: tracheostomy,   epistaxis,  sinusitis COR: CAD, CHF,  HTN, dysrhythmia PUL: COPD, ILD, asthma, pneumonia GI: +PEG, dysphagia, hemorrhage, other BRENDA: kidney disease, electrolyte disorder HEM:  +anemia, +thrombus, coagulopathy, cancer ENDO:  thyroid disease, diabetes mellitus CNS:  dementia, +stroke, seizure, PSY:  depression, anxiety, other      MEDICATIONS  (STANDING):  piperacillin/tazobactam IVPB.  IV Intermittent   piperacillin/tazobactam IVPB. 4.5Gram(s) IV Intermittent every 6 hours  insulin lispro (HumaLOG) corrective regimen sliding scale  SubCutaneous Before meals and at bedtime  ALBUTerol/ipratropium for Nebulization 3milliLiter(s) Nebulizer every 4 hours  cholecalciferol 1000Unit(s) Oral daily  levETIRAcetam 1000milliGRAM(s) Oral two times a day  ferrous    sulfate 325milliGRAM(s) Oral daily  pantoprazole    Tablet 40milliGRAM(s) Oral before breakfast  buDESOnide  180 MICROgram(s) Inhaler 1Puff(s) Inhalation daily  methylPREDNISolone sodium succinate Injectable 40milliGRAM(s) IV Push every 8 hours  polyethylene glycol 3350 17Gram(s) Oral two times a day  gabapentin 300milliGRAM(s) Oral daily  enoxaparin Injectable 40milliGRAM(s) SubCutaneous every 12 hours  vancomycin  IVPB 1250milliGRAM(s) IV Intermittent every 12 hours  methadone 10milliGRAM(s) Oral every 8 hours  HYDROmorphone  Injectable 0.5milliGRAM(s) IV Push once    MEDICATIONS  (PRN):  zolpidem 5milliGRAM(s) Oral at bedtime PRN Insomnia  LORazepam     Tablet 0.5milliGRAM(s) Oral every 8 hours PRN Anxiety  acetaminophen   Tablet. 650milliGRAM(s) Oral every 6 hours PRN Moderate Pain (4 - 6)      Vital Signs Last 24 Hrs  T(C): 36.6, Max: 36.7 (01-30 @ 09:38)  T(F): 97.8, Max: 98 (01-30 @ 09:38)  HR: 91 (84 - 91)  BP: 136/77 (128/80 - 136/77)  RR: 20 (16 - 20)  SpO2: 98% (97% - 98%)    GENERAL:         comfortable,  - distress.  HEENT:            - trauma,  - icterus,  - injection,  - nasal discharge.  NECK:              - jugular venous distention, - thyromegaly.  LYMPH:           - lymphadenopathy, - masses.  RESP:              crackles,   - rhonchi,   - wheezes.   COR:                S1S2 RRR  - murmurs,  - gallops,  - rubs.  ABD:                bowel sounds,   soft, - tender, - distended, - organomegaly.  EXT/MSC:         - cyanosis,  - clubbing,  - edema.    NEURO:             alert,   responds to stimuli.                          9.6    4.1   )-----------( 427      ( 30 Jan 2017 05:53 )             30.6     30 Jan 2017 05:53    CT chest (1/25)   1. No evidence of pulmonary artery emboli. No evidence of right   ventricular strain.  2. Findings compatible with small large airway inflammation with   bronchial wall thickening and scattered tree-in-bud micronodular pattern.   There is a mucous plugging are noted within the left lower lobe.  3. Focal consolidation/atelectasis involving the left lower lobe with a   small possible left parapneumonic effusion.    ASSESSMENT/PLAN    1)  Pneumonia  2)  Asthma  3)  Influenza  4)  DVT      Bronchodilators:  Atrovent/ albuterol q 4 – 6 hours as needed  Corticosteroids:  medrol/budesonide  ID/Antibiotics:  Vanco/Zosyn  Cardiac/HTN:  BP stable  GI: Rx/ prophylaxis c PPI/H2B  Heme: Rx/VT prophylaxis c AC  Discuss with medical team

## 2017-01-31 NOTE — PROVIDER CONTACT NOTE (FALL NOTIFICATION) - ASSESSMENT
Pt AO X3 as baseline,   VS assessed, VS stable, pt denies pain and dizziness. Pt stated that she wanted to  her spoon from the floor and slipped.  Right shin scratch noted. No skin breakdown noted. Dr. Estrada notified. Nurse managers Yvan and Bertha notified. Pt AO X3 as baseline,   VS assessed, VS stable, pt denies pain and dizziness. Pt denies hitting her head, denies hitting against anything. Pt stated that she wanted to  her spoon from the floor and slipped.   Right shin scratch noted. No skin breakdown noted. Dr. Estrada notified. Nurse managers Yvan and Bertha notified.

## 2017-02-01 LAB
% ALBUMIN: 53.6 % — SIGNIFICANT CHANGE UP
% ALPHA 1: 4.7 % — SIGNIFICANT CHANGE UP
% ALPHA 2: 9.2 % — SIGNIFICANT CHANGE UP
% BETA: 11.3 % — SIGNIFICANT CHANGE UP
% GAMMA: 21.2 % — SIGNIFICANT CHANGE UP
24R-OH-CALCIDIOL SERPL-MCNC: 16.8 NG/ML — LOW (ref 30–100)
4/8 RATIO: 1.67 RATIO — SIGNIFICANT CHANGE UP (ref 0.9–3.6)
ABS CD8: 340 /UL — SIGNIFICANT CHANGE UP (ref 136–757)
ALBUMIN SERPL ELPH-MCNC: 3.1 G/DL — LOW (ref 3.6–5.5)
ALBUMIN/GLOB SERPL ELPH: 1.2 RATIO — SIGNIFICANT CHANGE UP
ALPHA1 GLOB SERPL ELPH-MCNC: 0.3 G/DL — SIGNIFICANT CHANGE UP (ref 0.1–0.4)
ALPHA2 GLOB SERPL ELPH-MCNC: 0.5 G/DL — SIGNIFICANT CHANGE UP (ref 0.5–1)
AUTO DIFF PNL BLD: NEGATIVE — SIGNIFICANT CHANGE UP
B-GLOBULIN SERPL ELPH-MCNC: 0.6 G/DL — SIGNIFICANT CHANGE UP (ref 0.5–1)
C-ANCA SER-ACNC: NEGATIVE — SIGNIFICANT CHANGE UP
C3 SERPL-MCNC: 92 MG/DL — SIGNIFICANT CHANGE UP (ref 80–180)
C4 SERPL-MCNC: 20 MG/DL — SIGNIFICANT CHANGE UP (ref 10–45)
CD3 BLASTS SPEC-ACNC: 65 % — SIGNIFICANT CHANGE UP (ref 59–85)
CD3 BLASTS SPEC-ACNC: 946 /UL — SIGNIFICANT CHANGE UP (ref 799–2171)
CD4 %: 39 % — SIGNIFICANT CHANGE UP (ref 36–65)
CD8 %: 23 % — SIGNIFICANT CHANGE UP (ref 11–36)
DSDNA AB FLD-ACNC: <0.2 AI — SIGNIFICANT CHANGE UP
DSDNA AB SER-ACNC: <12 IU/ML — SIGNIFICANT CHANGE UP
ENA SS-A AB FLD IA-ACNC: <0.2 AI — SIGNIFICANT CHANGE UP
EOSINOPHIL NFR BLD AUTO: 0.2 % — SIGNIFICANT CHANGE UP (ref 0–6)
GAMMA GLOBULIN: 1.2 G/DL — SIGNIFICANT CHANGE UP (ref 0.6–1.6)
HCT VFR BLD CALC: 32 % — LOW (ref 34.5–45)
HGB BLD-MCNC: 9.8 G/DL — LOW (ref 11.5–15.5)
IGA FLD-MCNC: 218 MG/DL — SIGNIFICANT CHANGE UP (ref 68–378)
IGG FLD-MCNC: 1400 MG/DL — SIGNIFICANT CHANGE UP (ref 694–1618)
IGM SERPL-MCNC: 145 MG/DL — SIGNIFICANT CHANGE UP (ref 40–230)
INTERPRETATION SERPL IFE-IMP: SIGNIFICANT CHANGE UP
KAPPA LC SER QL IFE: 2.61 MG/DL — HIGH (ref 0.33–1.94)
KAPPA/LAMBDA FREE LIGHT CHAIN RATIO, SERUM: 1.4 RATIO — SIGNIFICANT CHANGE UP (ref 0.26–1.65)
LAMBDA LC SER QL IFE: 1.86 MG/DL — SIGNIFICANT CHANGE UP (ref 0.57–2.63)
LYMPHOCYTES # BLD AUTO: 25.7 % — SIGNIFICANT CHANGE UP (ref 13–44)
MCHC RBC-ENTMCNC: 26.5 PG — LOW (ref 27–34)
MCHC RBC-ENTMCNC: 30.6 G/DL — LOW (ref 32–36)
MCV RBC AUTO: 86.5 FL — SIGNIFICANT CHANGE UP (ref 80–100)
MONOCYTES NFR BLD AUTO: 15.4 % — HIGH (ref 2–14)
NEUTROPHILS NFR BLD AUTO: 58.7 % — SIGNIFICANT CHANGE UP (ref 43–77)
P-ANCA SER-ACNC: NEGATIVE — SIGNIFICANT CHANGE UP
PLATELET # BLD AUTO: 474 K/UL — HIGH (ref 150–400)
PROT PATTERN SERPL ELPH-IMP: SIGNIFICANT CHANGE UP
PROT SERPL-MCNC: 6.5 G/DL — SIGNIFICANT CHANGE UP (ref 6–8.3)
PROT SERPL-MCNC: 6.5 G/DL — SIGNIFICANT CHANGE UP (ref 6–8.3)
RBC # BLD: 3.7 M/UL — LOW (ref 3.8–5.2)
RBC # FLD: 18.6 % — HIGH (ref 10.3–16.9)
RHEUMATOID FACT SERPL-ACNC: 8.8 IU/ML — SIGNIFICANT CHANGE UP (ref 0–13.9)
T-CELL CD4 SUBSET PNL BLD: 569 /UL — SIGNIFICANT CHANGE UP (ref 489–1457)
WBC # BLD: 4.4 K/UL — SIGNIFICANT CHANGE UP (ref 3.8–10.5)
WBC # FLD AUTO: 4.4 K/UL — SIGNIFICANT CHANGE UP (ref 3.8–10.5)

## 2017-02-01 PROCEDURE — 93312 ECHO TRANSESOPHAGEAL: CPT | Mod: 26

## 2017-02-01 PROCEDURE — 99232 SBSQ HOSP IP/OBS MODERATE 35: CPT

## 2017-02-01 PROCEDURE — 93325 DOPPLER ECHO COLOR FLOW MAPG: CPT | Mod: 26

## 2017-02-01 PROCEDURE — 93320 DOPPLER ECHO COMPLETE: CPT | Mod: 26

## 2017-02-01 RX ORDER — ATORVASTATIN CALCIUM 80 MG/1
40 TABLET, FILM COATED ORAL AT BEDTIME
Qty: 0 | Refills: 0 | Status: DISCONTINUED | OUTPATIENT
Start: 2017-02-01 | End: 2017-02-03

## 2017-02-01 RX ORDER — VANCOMYCIN HCL 1 G
VIAL (EA) INTRAVENOUS
Qty: 0 | Refills: 0 | Status: DISCONTINUED | OUTPATIENT
Start: 2017-02-01 | End: 2017-02-01

## 2017-02-01 RX ORDER — VANCOMYCIN HCL 1 G
1250 VIAL (EA) INTRAVENOUS EVERY 12 HOURS
Qty: 0 | Refills: 0 | Status: DISCONTINUED | OUTPATIENT
Start: 2017-02-01 | End: 2017-02-01

## 2017-02-01 RX ORDER — HYDROMORPHONE HYDROCHLORIDE 2 MG/ML
4 INJECTION INTRAMUSCULAR; INTRAVENOUS; SUBCUTANEOUS ONCE
Qty: 0 | Refills: 0 | Status: DISCONTINUED | OUTPATIENT
Start: 2017-02-01 | End: 2017-02-01

## 2017-02-01 RX ORDER — VANCOMYCIN HCL 1 G
1500 VIAL (EA) INTRAVENOUS EVERY 12 HOURS
Qty: 0 | Refills: 0 | Status: DISCONTINUED | OUTPATIENT
Start: 2017-02-01 | End: 2017-02-01

## 2017-02-01 RX ORDER — VANCOMYCIN HCL 1 G
1250 VIAL (EA) INTRAVENOUS ONCE
Qty: 0 | Refills: 0 | Status: COMPLETED | OUTPATIENT
Start: 2017-02-01 | End: 2017-02-01

## 2017-02-01 RX ORDER — ERGOCALCIFEROL 1.25 MG/1
50000 CAPSULE ORAL
Qty: 0 | Refills: 0 | Status: DISCONTINUED | OUTPATIENT
Start: 2017-02-01 | End: 2017-02-03

## 2017-02-01 RX ADMIN — Medication 3 MILLILITER(S): at 15:52

## 2017-02-01 RX ADMIN — ENOXAPARIN SODIUM 40 MILLIGRAM(S): 100 INJECTION SUBCUTANEOUS at 05:54

## 2017-02-01 RX ADMIN — HYDROMORPHONE HYDROCHLORIDE 4 MILLIGRAM(S): 2 INJECTION INTRAMUSCULAR; INTRAVENOUS; SUBCUTANEOUS at 19:29

## 2017-02-01 RX ADMIN — GABAPENTIN 300 MILLIGRAM(S): 400 CAPSULE ORAL at 15:48

## 2017-02-01 RX ADMIN — POLYETHYLENE GLYCOL 3350 17 GRAM(S): 17 POWDER, FOR SOLUTION ORAL at 05:55

## 2017-02-01 RX ADMIN — Medication 3 MILLILITER(S): at 02:16

## 2017-02-01 RX ADMIN — METHADONE HYDROCHLORIDE 10 MILLIGRAM(S): 40 TABLET ORAL at 15:52

## 2017-02-01 RX ADMIN — Medication 3 MILLILITER(S): at 21:07

## 2017-02-01 RX ADMIN — Medication 0.5 MILLIGRAM(S): at 21:07

## 2017-02-01 RX ADMIN — Medication 40 MILLIGRAM(S): at 21:07

## 2017-02-01 RX ADMIN — PIPERACILLIN AND TAZOBACTAM 200 GRAM(S): 4; .5 INJECTION, POWDER, LYOPHILIZED, FOR SOLUTION INTRAVENOUS at 05:54

## 2017-02-01 RX ADMIN — LEVETIRACETAM 1000 MILLIGRAM(S): 250 TABLET, FILM COATED ORAL at 18:12

## 2017-02-01 RX ADMIN — Medication 325 MILLIGRAM(S): at 15:48

## 2017-02-01 RX ADMIN — ZOLPIDEM TARTRATE 5 MILLIGRAM(S): 10 TABLET ORAL at 21:07

## 2017-02-01 RX ADMIN — Medication 166.67 MILLIGRAM(S): at 01:22

## 2017-02-01 RX ADMIN — ATORVASTATIN CALCIUM 40 MILLIGRAM(S): 80 TABLET, FILM COATED ORAL at 21:07

## 2017-02-01 RX ADMIN — Medication 1 PUFF(S): at 15:52

## 2017-02-01 RX ADMIN — ERGOCALCIFEROL 50000 UNIT(S): 1.25 CAPSULE ORAL at 15:48

## 2017-02-01 RX ADMIN — Medication 3 MILLILITER(S): at 05:55

## 2017-02-01 RX ADMIN — METHADONE HYDROCHLORIDE 10 MILLIGRAM(S): 40 TABLET ORAL at 05:55

## 2017-02-01 RX ADMIN — Medication 40 MILLIGRAM(S): at 05:54

## 2017-02-01 RX ADMIN — ENOXAPARIN SODIUM 40 MILLIGRAM(S): 100 INJECTION SUBCUTANEOUS at 18:13

## 2017-02-01 RX ADMIN — Medication 1: at 22:07

## 2017-02-01 RX ADMIN — HYDROMORPHONE HYDROCHLORIDE 4 MILLIGRAM(S): 2 INJECTION INTRAMUSCULAR; INTRAVENOUS; SUBCUTANEOUS at 20:00

## 2017-02-01 RX ADMIN — METHADONE HYDROCHLORIDE 10 MILLIGRAM(S): 40 TABLET ORAL at 22:18

## 2017-02-01 RX ADMIN — PANTOPRAZOLE SODIUM 40 MILLIGRAM(S): 20 TABLET, DELAYED RELEASE ORAL at 05:55

## 2017-02-01 RX ADMIN — LEVETIRACETAM 1000 MILLIGRAM(S): 250 TABLET, FILM COATED ORAL at 05:54

## 2017-02-01 NOTE — PROGRESS NOTE ADULT - ATTENDING COMMENTS
Plan:As per orders
Patient seen and examined and evaluation completed in person
Pt seen and examined and evaluated by me personally
Assessment and Recommendation: MICU consult  · Assessment    This is a 62 y/o female with post viral PNA with increased work of breathing secondary to likely asthma exaerbation    Problem/Recommendation - 1:  Problem: Asthma. Recommendation: Pt with increased work of breathing this am, given duonebs and solumedrol. Now on Solumedrol Q8, Duoneb's Q4. SOB and increased work of breathing likely 2/2 asthma exacerbation in the setting of post viral pneumonia. Currently the pt appears comfortable, no signs of respiratory distress. Saturating 97% on nasal cannula. CT with LLL infiltrate and bronchiectatic changes. Would continue current management. Would check peak flow.   Pt stable for the regional medical floor..  Will get Rheum. consult,Dr Ferro & I want to R/O SLE
Pending  Bx  Dr SHOAIB Velarde to see pt.

## 2017-02-01 NOTE — PROGRESS NOTE ADULT - NSHPATTENDINGPLANDISCUSS_GEN_ALL_CORE
House staff
ID & Heme/Onc
medical team
[facility]
ID,Heme,Rheum.
Dr Burgos,

## 2017-02-01 NOTE — PROVIDER CONTACT NOTE (CHANGE IN STATUS NOTIFICATION) - SITUATION
Pt c/o SOB at 21:35. AT 21:37 T99  /78 RR18 O2 % on duoneb nebulizer treatment. Dr. Myra Stoll at bedside.

## 2017-02-01 NOTE — PROGRESS NOTE ADULT - SUBJECTIVE AND OBJECTIVE BOX
CC/ HPI 62 y/o female admitted with respiratory insufficiency, AH3 influenza, LLL pneumonia , multiple sclerosis, asthma, resting comfortably.    PAST MEDICAL & SURGICAL HISTORY:  CVA (cerebral vascular accident)  Anemia  G tube feedings  History of DVT (deep vein thrombosis)  MS (multiple sclerosis)  Small bowel obstruction: multiple, recurrent  Asthma  SBO (small bowel obstruction): multiple    SOCHX:  + tobacco    FMHX: FA/MO  - contributory     ROS reviewed below with positive findings marked (+) :  GEN:  fever, chills ENT: tracheostomy,   epistaxis,  sinusitis COR: CAD, CHF,  HTN, dysrhythmia PUL: COPD, ILD, asthma, pneumonia GI: +PEG, dysphagia, hemorrhage, other BRENDA: kidney disease, electrolyte disorder HEM:  +anemia, +thrombus, coagulopathy, cancer ENDO:  thyroid disease, diabetes mellitus CNS:  dementia, +stroke, seizure, PSY:  depression, anxiety, other    MEDICATIONS  (STANDING):  piperacillin/tazobactam IVPB.  IV Intermittent   piperacillin/tazobactam IVPB. 4.5Gram(s) IV Intermittent every 6 hours  insulin lispro (HumaLOG) corrective regimen sliding scale  SubCutaneous Before meals and at bedtime  ALBUTerol/ipratropium for Nebulization 3milliLiter(s) Nebulizer every 4 hours  levETIRAcetam 1000milliGRAM(s) Oral two times a day  ferrous    sulfate 325milliGRAM(s) Oral daily  pantoprazole    Tablet 40milliGRAM(s) Oral before breakfast  buDESOnide  180 MICROgram(s) Inhaler 1Puff(s) Inhalation daily  methylPREDNISolone sodium succinate Injectable 40milliGRAM(s) IV Push every 8 hours  polyethylene glycol 3350 17Gram(s) Oral two times a day  gabapentin 300milliGRAM(s) Oral daily  enoxaparin Injectable 40milliGRAM(s) SubCutaneous every 12 hours  methadone 10milliGRAM(s) Oral every 8 hours  ergocalciferol 42625Ulik(s) Oral every week  vancomycin  IVPB 1250milliGRAM(s) IV Intermittent every 12 hours    MEDICATIONS  (PRN):  zolpidem 5milliGRAM(s) Oral at bedtime PRN Insomnia  LORazepam     Tablet 0.5milliGRAM(s) Oral every 8 hours PRN Anxiety  acetaminophen   Tablet. 650milliGRAM(s) Oral every 6 hours PRN Moderate Pain (4 - 6)  aluminum hydroxide/magnesium hydroxide/simethicone Suspension 30milliLiter(s) Oral every 4 hours PRN Dyspepsia      Vital Signs Last 24 Hrs  T(C): 35.9, Max: 37.5 (01-31 @ 20:59)  T(F): 96.7, Max: 99.5 (01-31 @ 20:59)  HR: 83 (83 - 115)  BP: 117/74 (106/75 - 118/81)  RR: 17 (17 - 18)  SpO2: 100% (95% - 100%)    GENERAL:         comfortable,  - distress.  HEENT:            - trauma,  - icterus,  - injection,  - nasal discharge.  NECK:              - jugular venous distention, - thyromegaly.  LYMPH:           - lymphadenopathy, - masses.  RESP:               - clear,   - rales,   - rhonchi,   - wheezes.   COR:                S1S2 RRR  - murmurs,  - gallops,  - rubs.  ABD:                bowel sounds,   soft, - tender, - distended, - organomegaly.  EXT/MSC:         - cyanosis,  - clubbing,  - edema.    NEURO:             alert,   responds to stimuli.    TPro  6.5    /  Alb  x      /  TBili  x      /  DBili  x      /  AST  x      /  ALT  x      /  AlkPhos  x      31 Jan 2017 22:55    CT chest (1/25)   1. No evidence of pulmonary artery emboli. No evidence of right   ventricular strain.  2. Findings compatible with small large airway inflammation with   bronchial wall thickening and scattered tree-in-bud micronodular pattern.   There is a mucous plugging are noted within the left lower lobe.  3. Focal consolidation/atelectasis involving the left lower lobe with a   small possible left parapneumonic effusion.    ASSESSMENT/PLAN    1)  Pneumonia  2)  Asthma  3)  Influenza  4)  DVT      Bronchodilators:  Atrovent/ albuterol q 4 – 6 hours as needed  Corticosteroids:  medrol/budesonide  ID/Antibiotics:  Vanco/Zosyn  Cardiac/HTN:  BP stable  GI: Rx/ prophylaxis c PPI/H2B  Heme: Rx/VT prophylaxis c AC  Discuss with medical team CC/ HPI 60 y/o female admitted with respiratory insufficiency, AH3 influenza, LLL pneumonia , multiple sclerosis, asthma, resting comfortably c/o dyspnea and wheezing.    PAST MEDICAL & SURGICAL HISTORY:  CVA (cerebral vascular accident)  Anemia  G tube feedings  History of DVT (deep vein thrombosis)  MS (multiple sclerosis)  Small bowel obstruction: multiple, recurrent  Asthma  SBO (small bowel obstruction): multiple    SOCHX:  + tobacco    FMHX: FA/MO  - contributory     ROS reviewed below with positive findings marked (+) :  GEN:  fever, chills ENT: tracheostomy,   epistaxis,  sinusitis COR: CAD, CHF,  HTN, dysrhythmia PUL: COPD, ILD, asthma, pneumonia GI: +PEG, dysphagia, hemorrhage, other BRENDA: kidney disease, electrolyte disorder HEM:  +anemia, +thrombus, coagulopathy, cancer ENDO:  thyroid disease, diabetes mellitus CNS:  dementia, +stroke, seizure, PSY:  depression, anxiety, other    MEDICATIONS  (STANDING):  piperacillin/tazobactam IVPB.  IV Intermittent   piperacillin/tazobactam IVPB. 4.5Gram(s) IV Intermittent every 6 hours  insulin lispro (HumaLOG) corrective regimen sliding scale  SubCutaneous Before meals and at bedtime  ALBUTerol/ipratropium for Nebulization 3milliLiter(s) Nebulizer every 4 hours  levETIRAcetam 1000milliGRAM(s) Oral two times a day  ferrous    sulfate 325milliGRAM(s) Oral daily  pantoprazole    Tablet 40milliGRAM(s) Oral before breakfast  buDESOnide  180 MICROgram(s) Inhaler 1Puff(s) Inhalation daily  methylPREDNISolone sodium succinate Injectable 40milliGRAM(s) IV Push every 8 hours  polyethylene glycol 3350 17Gram(s) Oral two times a day  gabapentin 300milliGRAM(s) Oral daily  enoxaparin Injectable 40milliGRAM(s) SubCutaneous every 12 hours  methadone 10milliGRAM(s) Oral every 8 hours  ergocalciferol 75460Bprd(s) Oral every week  vancomycin  IVPB 1250milliGRAM(s) IV Intermittent every 12 hours    MEDICATIONS  (PRN):  zolpidem 5milliGRAM(s) Oral at bedtime PRN Insomnia  LORazepam     Tablet 0.5milliGRAM(s) Oral every 8 hours PRN Anxiety  acetaminophen   Tablet. 650milliGRAM(s) Oral every 6 hours PRN Moderate Pain (4 - 6)  aluminum hydroxide/magnesium hydroxide/simethicone Suspension 30milliLiter(s) Oral every 4 hours PRN Dyspepsia      Vital Signs Last 24 Hrs  T(C): 35.9, Max: 37.5 (01-31 @ 20:59)  T(F): 96.7, Max: 99.5 (01-31 @ 20:59)  HR: 83 (83 - 115)  BP: 117/74 (106/75 - 118/81)  RR: 17 (17 - 18)  SpO2: 100% (95% - 100%)    GENERAL:         comfortable,  - distress.  HEENT:            - trauma,  - icterus,  - injection,  - nasal discharge.  NECK:              - jugular venous distention, - thyromegaly.  LYMPH:           - lymphadenopathy, - masses.  RESP:              - rales,   - rhonchi,   + wheezes.   COR:                S1S2 RRR  - murmurs,  - gallops,  - rubs.  ABD:                bowel sounds,   soft, - tender, - distended, - organomegaly.  EXT/MSC:         - cyanosis,  + clubbing,  - edema.    NEURO:             alert,   responds to stimuli.    TPro  6.5    /  Alb  x      /  TBili  x      /  DBili  x      /  AST  x      /  ALT  x      /  AlkPhos  x      31 Jan 2017 22:55    CT chest (1/25)   1. No evidence of pulmonary artery emboli. No evidence of right   ventricular strain.  2. Findings compatible with small large airway inflammation with   bronchial wall thickening and scattered tree-in-bud micronodular pattern.   There is a mucous plugging are noted within the left lower lobe.  3. Focal consolidation/atelectasis involving the left lower lobe with a   small possible left parapneumonic effusion.    ASSESSMENT/PLAN    1)  Pneumonia  2)  Asthma  3)  Influenza  4)  DVT      Bedside spirometry  Bronchodilators:  Atrovent/ albuterol q 4 – 6 hours as needed  Corticosteroids:  medrol/budesonide  ID/Antibiotics:  Vanco/Zosyn  Cardiac/HTN:  BP stable  GI: Rx/ prophylaxis c PPI/H2B  Heme: Rx/VT prophylaxis c AC  Discussed with medical team

## 2017-02-01 NOTE — PROGRESS NOTE ADULT - SUBJECTIVE AND OBJECTIVE BOX
Neurology Follow up note    Name  MARK SNYDER    HPI:  Pt is a 61 year old female with PMH of anemia, asthma, DVT with noncompliance with AC, MS, multiple SBO's s/p multiple abdominal surgeries and recent CVA admitted to [facility] about 4 weeks ago for 4-6weeks who presented to the ED today after being sent in from Dr. Garay's office for tachycardia, low blood pressure and complaint of cough and sob.  Pt stated she has been having a cough with yellow phlegm since Last friday along with f/c/s and congestion and myalgias.  Pt stated her sister also was sick last week with similar symptoms. She denied wheezing at home or increased use of her inhalers.  Pt also stated she was admitted at [facility] about 4 weeks ago for a stroke that she had and was told she had a brain bleed but unclear if actual brain bleed as patient stated she was also taking lovenox at home.  Since her stroke about 4 weeks ago the patient has admitted to having continued lower extremity weakness and numbness in her toes up to mid shin with difficulty walking and getting out of bed.  Pt denied back pain.  She also stated she has had multiple SBO's about 6 of them and that she hasn't had a BM in about one week and has had a diffuse cramping abdominal pain for the last few days with an episode of nausea last week, but no vomiting.  She admitted to decreased PO intake at home.  In the ED patient had a CT angio to r/o PE and vital signs were Tmax: 99.0, HR: , BP 98/61, RR 18 and sO2 of 92% room air and 98% on 2L NC. (25 Jan 2017 22:05)      Interval History - no change in mental status        REVIEW OF SYSTEMS    Vital Signs Last 24 Hrs  T(C): 35.9, Max: 37.5 (01-31 @ 20:59)  T(F): 96.7, Max: 99.5 (01-31 @ 20:59)  HR: 83 (81 - 115)  BP: 117/74 (106/75 - 118/81)  BP(mean): --  RR: 17 (16 - 18)  SpO2: 100% (95% - 100%)    Physical Exam-     Mental Status- lethargic    Cranial Nerves- full lateral eye movements    Gait and station-n/a    Motor- moves all 4 extremities    Reflexes- decreased    Sensation-    Coordination- no tremors    Vascular -    Medications  piperacillin/tazobactam IVPB.  IV Intermittent   piperacillin/tazobactam IVPB. 4.5Gram(s) IV Intermittent every 6 hours  insulin lispro (HumaLOG) corrective regimen sliding scale  SubCutaneous Before meals and at bedtime  ALBUTerol/ipratropium for Nebulization 3milliLiter(s) Nebulizer every 4 hours  cholecalciferol 1000Unit(s) Oral daily  levETIRAcetam 1000milliGRAM(s) Oral two times a day  ferrous    sulfate 325milliGRAM(s) Oral daily  pantoprazole    Tablet 40milliGRAM(s) Oral before breakfast  buDESOnide  180 MICROgram(s) Inhaler 1Puff(s) Inhalation daily  methylPREDNISolone sodium succinate Injectable 40milliGRAM(s) IV Push every 8 hours  polyethylene glycol 3350 17Gram(s) Oral two times a day  zolpidem 5milliGRAM(s) Oral at bedtime PRN  gabapentin 300milliGRAM(s) Oral daily  LORazepam     Tablet 0.5milliGRAM(s) Oral every 8 hours PRN  enoxaparin Injectable 40milliGRAM(s) SubCutaneous every 12 hours  methadone 10milliGRAM(s) Oral every 8 hours  acetaminophen   Tablet. 650milliGRAM(s) Oral every 6 hours PRN  aluminum hydroxide/magnesium hydroxide/simethicone Suspension 30milliLiter(s) Oral every 4 hours PRN  vancomycin  IVPB  IV Intermittent   vancomycin  IVPB 1250milliGRAM(s) IV Intermittent every 12 hours      Lab      Radiology    Assessment- OBS- no seizures    Plan- supportive care

## 2017-02-01 NOTE — DISCHARGE NOTE ADULT - CARE PROVIDERS DIRECT ADDRESSES
,DirectAddress_Unknown,DirectAddress_Unknown,DirectAddress_Unknown ,DirectAddress_Unknown,DirectAddress_Unknown,[email or web address],DirectAddress_Unknown

## 2017-02-01 NOTE — DISCHARGE NOTE ADULT - CARE PLAN
Principal Discharge DX:	Pneumonia and influenza  Goal:	Improvement of symptoms  Instructions for follow-up, activity and diet:	You presented to us with shortness of breath,  Secondary Diagnosis:	DVT (deep venous thrombosis)  Secondary Diagnosis:	Chronic pain  Secondary Diagnosis:	Asthma with acute exacerbation, unspecified asthma severity Principal Discharge DX:	Pneumonia and influenza  Goal:	Improvement of symptoms  Instructions for follow-up, activity and diet:	You presented to us with shortness of breath, and were found to have influenza and a pneumonia. We treated you antibiotics after which your symptoms improved.  Secondary Diagnosis:	DVT (deep venous thrombosis)  Instructions for follow-up, activity and diet:	You were found to have a DVT in your leg. Please continue taking Lovenox for  Secondary Diagnosis:	Chronic pain  Instructions for follow-up, activity and diet:	You have hx of pain. We consulted our pain management doctors to  Secondary Diagnosis:	Asthma with acute exacerbation, unspecified asthma severity  Secondary Diagnosis:	Leukopenia Principal Discharge DX:	Pneumonia and influenza  Goal:	Improvement of symptoms  Instructions for follow-up, activity and diet:	You presented to us with shortness of breath, and were found to have influenza and a pneumonia. We treated you antibiotics after which your symptoms improved.  Secondary Diagnosis:	DVT (deep venous thrombosis)  Instructions for follow-up, activity and diet:	You were found to have a DVT in your leg. Please call Dr Garay's office tomorrow and make an appointment to see him.  Secondary Diagnosis:	Chronic pain  Instructions for follow-up, activity and diet:	You have hx of pain. We consulted our pain management doctors, please follow up with your own pain management doctor within the next week.  Secondary Diagnosis:	Asthma with acute exacerbation, unspecified asthma severity  Secondary Diagnosis:	Leukopenia Principal Discharge DX:	Pneumonia and influenza  Goal:	Improvement of symptoms  Instructions for follow-up, activity and diet:	You presented to us with shortness of breath, and were found to have influenza and a pneumonia. We treated you antibiotics after which your symptoms improved. You need to continue taking Tamiflu and Levaquin for 7 more days to complete to total antibiotics course.  Secondary Diagnosis:	DVT (deep venous thrombosis)  Instructions for follow-up, activity and diet:	You were found to have a DVT in your leg. We started you on Lovenox while you were in the hospital, however, recommending that you start taking Xeralto and follow up with Dr. Garay for further evaluation.  Secondary Diagnosis:	Chronic pain  Instructions for follow-up, activity and diet:	You have hx of pain. We consulted our pain management doctors, please follow up with your own pain management doctor within the next week.  Secondary Diagnosis:	Asthma with acute exacerbation, unspecified asthma severity  Instructions for follow-up, activity and diet:	You were also found to be having an asthma exacerbation. We are prescribing steroids for you, please continue with the steroid taper and follow up with Dr. Heredia for further evaluation.  Secondary Diagnosis:	Leukopenia  Instructions for follow-up, activity and diet:	You were found to have low white blood cell count, which helps you fight infections. We did a lot of laboratory tests and a bone marrow biopsy to evaluate your leukopenia. Please follow up with Dr. Burgos for further evaluation.  Secondary Diagnosis:	Anemia  Instructions for follow-up, activity and diet:	You are also found anemic, in the setting of chronic medical diseases. Please continue to take your iron supplements.

## 2017-02-01 NOTE — DISCHARGE NOTE ADULT - MEDICATION SUMMARY - MEDICATIONS TO STOP TAKING
I will STOP taking the medications listed below when I get home from the hospital:    Dilaudid 8 mg oral tablet  -- 1 tab(s) by mouth every 4 hours    calcitriol 0.5 mcg oral capsule  -- 1 cap(s) by mouth every 12 hours    Augmentin 875 mg-125 mg oral tablet  -- 1 tab(s) by mouth every 12 hours    doxycycline monohydrate 100 mg oral capsule  -- 1 cap(s) by mouth every 12 hours

## 2017-02-01 NOTE — PROGRESS NOTE ADULT - SUBJECTIVE AND OBJECTIVE BOX
INTERVAL HPI/OVERNIGHT EVENTS:  Reviewed  Requests more pain meds  States SOB    MEDICATIONS  (STANDING):  insulin lispro (HumaLOG) corrective regimen sliding scale  SubCutaneous Before meals and at bedtime  ALBUTerol/ipratropium for Nebulization 3milliLiter(s) Nebulizer every 4 hours  levETIRAcetam 1000milliGRAM(s) Oral two times a day  ferrous    sulfate 325milliGRAM(s) Oral daily  pantoprazole    Tablet 40milliGRAM(s) Oral before breakfast  buDESOnide  180 MICROgram(s) Inhaler 1Puff(s) Inhalation daily  polyethylene glycol 3350 17Gram(s) Oral two times a day  gabapentin 300milliGRAM(s) Oral daily  enoxaparin Injectable 40milliGRAM(s) SubCutaneous every 12 hours  methadone 10milliGRAM(s) Oral every 8 hours  ergocalciferol 39212Xaqs(s) Oral every week  methylPREDNISolone sodium succinate Injectable 40milliGRAM(s) IV Push every 8 hours    MEDICATIONS  (PRN):  zolpidem 5milliGRAM(s) Oral at bedtime PRN Insomnia  LORazepam     Tablet 0.5milliGRAM(s) Oral every 8 hours PRN Anxiety  acetaminophen   Tablet. 650milliGRAM(s) Oral every 6 hours PRN Moderate Pain (4 - 6)  aluminum hydroxide/magnesium hydroxide/simethicone Suspension 30milliLiter(s) Oral every 4 hours PRN Dyspepsia      Allergies    dust (Unknown)  latex (Unknown)  No Known Drug Allergies  Nuts (Anaphylaxis)  peanuts (Unknown)    EXAM    Vital Signs Last 24 Hrs  T(C): 36.2, Max: 37.5 (01-31 @ 20:59)  T(F): 97.2, Max: 99.5 (01-31 @ 20:59)  HR: 87 (83 - 115)  BP: 101/65 (101/65 - 117/74)  BP(mean): --  RR: 16 (16 - 18)  SpO2: 99% (99% - 100%)  On RA without distress  Awake and alert  No rash  RRR  Chest with fair air movement  Abd soft NT; no diarrhea      LABS:        TPro  6.5    /  Alb  x      /  TBili  x      /  DBili  x      /  AST  x      /  ALT  x      /  AlkPhos  x      31 Jan 2017 22:55      MICROBIOLOGY:    Culture - Blood (01.28.17 @ 03:31)    Specimen Source: .Blood None    Culture Results:   No growth at 4 days.    Culture - Blood (01.28.17 @ 03:31)    Specimen Source: .Blood None    Culture Results:   No growth at 4 days.

## 2017-02-01 NOTE — DISCHARGE NOTE ADULT - PLAN OF CARE
Improvement of symptoms You presented to us with shortness of breath, You were found to have a DVT in your leg. Please continue taking Lovenox for You have hx of pain. We consulted our pain management doctors to You presented to us with shortness of breath, and were found to have influenza and a pneumonia. We treated you antibiotics after which your symptoms improved. You were found to have a DVT in your leg. Please call Dr Garay's office tomorrow and make an appointment to see him. You have hx of pain. We consulted our pain management doctors, please follow up with your own pain management doctor within the next week. You were also found to be having an asthma exacerbation. We are prescribing steroids for you, please continue with the steroid taper and follow up with Dr. Heredia for further evaluation. You were found to have low white blood cell count, which helps you fight infections. We did a lot of laboratory tests and a bone marrow biopsy to evaluate your leukopenia. Please follow up with Dr. Burgos for further evaluation. You are also found anemic, in the setting of chronic medical diseases. Please continue to take your iron supplements. You presented to us with shortness of breath, and were found to have influenza and a pneumonia. We treated you antibiotics after which your symptoms improved. You need to continue taking Tamiflu and Levaquin for 7 more days to complete to total antibiotics course. You were found to have a DVT in your leg. We started you on Lovenox while you were in the hospital, however, recommending that you start taking Xeralto and follow up with Dr. Garay for further evaluation.

## 2017-02-01 NOTE — DISCHARGE NOTE ADULT - HOSPITAL COURSE
Pt is a 61 year old female with PMHx of anemia, asthma, DVT with noncompliance with AC, MS, multiple SBO's s/p multiple abdominal surgeries, and recent CVA who initially presented to the [facility] ED on 3/4/2017 for worsening shortness of breath, and was found to be septic secondary to pneumonia and RVP was positive for Influenza A. A CTA was performed which was unremarkable for PE, however, was remarkable for reticulo    Pt was admitted to the [facility] for further care. Pt tachycardia, low blood pressure and complaint of cough and sob.  Pt stated she has been having a cough with yellow phlegm since Last friday along with f/c/s and congestion and myalgias.  Pt stated her sister also was sick last week with similar symptoms. She denied wheezing at home or increased use of her inhalers.  Pt also stated she was admitted at [facility] about 4 weeks ago for a stroke that she had and was told she had a brain bleed but unclear if actual brain bleed as patient stated she was also taking lovenox at home.  Since her stroke about 4 weeks ago the patient has admitted to having continued lower extremity weakness and numbness in her toes up to mid shin with difficulty walking and getting out of bed.  Pt denied back pain.  She also stated she has had multiple SBO's about 6 of them and that she hasn't had a BM in about one week and has had a diffuse cramping abdominal pain for the last few days with an episode of nausea last week, but no vomiting.  She admitted to decreased PO intake at home.  In the ED patient had a CT angio to r/o PE and vital signs were Tmax: 99.0, HR: , BP 98/61, RR 18 and sO2 of 92% room air and 98% on 2L NC.  1/28: MARCELLUS, ordered for urine legionella, sputum culture. changed diet to enclude ensure as patient underweight.   1/29: Pt found hypoxic. s/p 100mg solumedrol. MICU consult called. c/w abx. Found w/ RLE DVT. Started lovenox. Added dilaudid to pain regimen.  1/30: Psych consutled, Pain management consulted, pt VSS. Pain regimen changed to methadon 10mg q8 standing, tylenol for mderate, dilaudid 4mg PO for severe pain, and IV 0.5mg Dilaudid for breaktrhough. Pt is a 61 year old female with PMHx of anemia, asthma, DVT with noncompliance with AC, MS, multiple SBO's s/p multiple abdominal surgeries, and recent CVA who initially presented to the [facility] ED on 1/24/2017 for worsening shortness of breath, and was found to be septic secondary to pneumonia and RVP was positive for Influenza A. A CTA was performed which was unremarkable for PE, however, was remarkable for nodular appearance in tree-bud distribution w/ large airway inflammation, mucus plugging in the LLL, atelectasis, and hepatic cysts. Pt was started on Tamiflu for influenza, Vanc/Zosyn for HCAP (given recent hospitalization), and IV solumedrol for asthma exacerbation. Pulmonology (Dr. Heredia) was consulted regarding pt's pulmonary disease. Cardiology (Dr. 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 Neurology was consulted give pt's recent hospitalization for a CVA (Dr. Mendel), a CT-head was also performed which was remarkable for encephalomalacia of left parietal region. Pt also complained of abdominal pain, an abdominal U/S was only remarkable for hepatic cysts and s/p cholecystectomy, however, no acute pathology. Pt's respiratory status improved and steroids were being tapered, however, pt was found hypoxic on 1/29 concerning for asthma exacerbation requiring a MICU consult and IV steroids, however, pt's respiratory status improved later that day. Pt was also found to have a DVT in the left proximal femoral vein, and was started on Lovenox. Pt persistently continued to complain of leg pain and abdominal pain refractory standing methadone and PRN dilaudid, upon which pain management was consulted. Pt had an episode of mechanical fall on 1/31, however, plain films were unremarkable for any fracture. Pt was also found leukopenic, hematology (Dr. Burgos) and a BM biopsy was performed with IR. ID (Dr. Correa) was consulted, and recommended that to continue Tamiflu for another 7 days, and Levaquin to complete a 14 day antibiotics course.  Pt's respiratory status improved over rest of the hospital course on PO steroids, and at this time, pt is being discharged to home w/ home PT with the instruction to follow up with Dr. Garay with 1 week for further evaluation.

## 2017-02-01 NOTE — PROGRESS NOTE ADULT - SUBJECTIVE AND OBJECTIVE BOX
Physical Medicine and Rehabilitation Progress Note:    Patient is a 61y old  Female who presents with a chief complaint of shortness of breath (01 Feb 2017 13:57)      HPI:  Pt is a 61 year old female with PMH of anemia, asthma, DVT with noncompliance with AC, MS, multiple SBO's s/p multiple abdominal surgeries and recent CVA admitted to [facility] about 4 weeks ago for 4-6weeks who presented to the ED today after being sent in from Dr. Garay's office for tachycardia, low blood pressure and complaint of cough and sob.  Pt stated she has been having a cough with yellow phlegm since Last friday along with f/c/s and congestion and myalgias.  Pt stated her sister also was sick last week with similar symptoms. She denied wheezing at home or increased use of her inhalers.  Pt also stated she was admitted at [facility] about 4 weeks ago for a stroke that she had and was told she had a brain bleed but unclear if actual brain bleed as patient stated she was also taking lovenox at home.  Since her stroke about 4 weeks ago the patient has admitted to having continued lower extremity weakness and numbness in her toes up to mid shin with difficulty walking and getting out of bed.  Pt denied back pain.  She also stated she has had multiple SBO's about 6 of them and that she hasn't had a BM in about one week and has had a diffuse cramping abdominal pain for the last few days with an episode of nausea last week, but no vomiting.  She admitted to decreased PO intake at home.  In the ED patient had a CT angio to r/o PE and vital signs were Tmax: 99.0, HR: , BP 98/61, RR 18 and sO2 of 92% room air and 98% on 2L NC. (25 Jan 2017 22:05)              TPro  6.5    /  Alb  x      /  TBili  x      /  DBili  x      /  AST  x      /  ALT  x      /  AlkPhos  x      31 Jan 2017 22:55    Vital Signs Last 24 Hrs  T(C): 36.2, Max: 37.5 (01-31 @ 20:59)  T(F): 97.2, Max: 99.5 (01-31 @ 20:59)  HR: 87 (83 - 115)  BP: 101/65 (101/65 - 117/74)  BP(mean): --  RR: 16 (16 - 18)  SpO2: 99% (99% - 100%)    MEDICATIONS  (STANDING):  insulin lispro (HumaLOG) corrective regimen sliding scale  SubCutaneous Before meals and at bedtime  ALBUTerol/ipratropium for Nebulization 3milliLiter(s) Nebulizer every 4 hours  levETIRAcetam 1000milliGRAM(s) Oral two times a day  ferrous    sulfate 325milliGRAM(s) Oral daily  pantoprazole    Tablet 40milliGRAM(s) Oral before breakfast  buDESOnide  180 MICROgram(s) Inhaler 1Puff(s) Inhalation daily  polyethylene glycol 3350 17Gram(s) Oral two times a day  gabapentin 300milliGRAM(s) Oral daily  enoxaparin Injectable 40milliGRAM(s) SubCutaneous every 12 hours  methadone 10milliGRAM(s) Oral every 8 hours  ergocalciferol 21666Kgmh(s) Oral every week  methylPREDNISolone sodium succinate Injectable 40milliGRAM(s) IV Push every 8 hours    MEDICATIONS  (PRN):  zolpidem 5milliGRAM(s) Oral at bedtime PRN Insomnia  LORazepam     Tablet 0.5milliGRAM(s) Oral every 8 hours PRN Anxiety  acetaminophen   Tablet. 650milliGRAM(s) Oral every 6 hours PRN Moderate Pain (4 - 6)  aluminum hydroxide/magnesium hydroxide/simethicone Suspension 30milliLiter(s) Oral every 4 hours PRN Dyspepsia    Currently Undergoing Physical Therapy at bedside.    Functional Status Assessment:    Previous Level of Function:   · Ambulation Skills	needed assist; needs device; RW	  	needed assist; needs device	  · Transfer Skills	needed assist; needs device; RW	  	needed assist; needs device	  · ADL Skills	needed assist; needs device; RW	  	needed assist; needs device	  · Work/Leisure Activity	needed assist; needs device; RW	  	needed assist; needs device	  · Additional Comments	Patient lives in an elevator apartment building with her brother and sister. Ambulates short distances with a RW.	    Cognitive Status Examination:   · Orientation	person	  · Level of Consciousness	alert	  · Follows Commands and Answers Questions	100% of the time	    Bed Mobility: Rolling/Turning:   · Level of [place]	contact guard	  · Physical Assist/Nonphysical Assist	1 person assist	  Bed Mobility: Scooting/Bridging:   · Level of [place]	minimum assist (75% patients effort)	    Bed Mobility: Sit to Supine:   · Level of [place]	contact guard	  · Physical Assist/Nonphysical Assist	1 person assist; verbal cues	  · Assistive Device	bed rails	    Bed Mobility: Supine to Sit:   · Level of [place]	minimum assist (75% patients effort); for assistance for BLE.	  · Physical Assist/Nonphysical Assist	1 person assist	    Transfer: Sit to Stand:   · Level of [place]	Did not attempt this session secondary to patient becoming SOB while doing therex at [employer].	    Clinical Impressions:   · Criteria for Skilled Therapeutic Interventions	impairments found	  · Impairments Found (describe specific impairments)	aerobic capacity/endurance; gait, locomotion, and balance; muscle strength; posture	  · Functional Limitations in Following Categories (describe specific limitations)	self-care; home management; community/leisure	  · Rehab Potential	good, to achieve stated therapy goals	  · Therapy Frequency	2-3x/week	  Bed Mobility: Scooting/Bridging:   · Level of [place]	minimum assist (75% patients effort)	    Bed Mobility: Sit to Supine:   · Level of [place]	contact guard	  · Physical Assist/Nonphysical Assist	1 person assist; verbal cues	  · Assistive Device	bed rails	    Bed Mobility: Supine to Sit:   · Level of [place]	minimum assist (75% patients effort); for assistance for BLE.	  · Physical Assist/Nonphysical Assist	1 person assist	    Transfer: Sit to Stand:   · Level of [place]	Did not attempt this session secondary to patient becoming SOB while doing therex at [employer].	    Clinical Impressions:   · Criteria for Skilled Therapeutic Interventions	impairments found	  · Impairments Found (describe specific impairments)	aerobic capacity/endurance; gait, locomotion, and balance; muscle strength; posture	  · Functional Limitations in Following Categories (describe specific limitations)	self-care; home management; community/leisure	  · Rehab Potential	good, to achieve stated therapy goals	  · Therapy Frequency	2-3x/week	  Bed Mobility: Scooting/Bridging:   · Level of [place]	minimum assist (75% patients effort)	    Bed Mobility: Sit to Supine:   · Level of [place]	contact guard	  · Physical Assist/Nonphysical Assist	1 person assist; verbal cues	  · Assistive Device	bed rails	    Bed Mobility: Supine to Sit:   · Level of [place]	minimum assist (75% patients effort); for assistance for BLE.	  · Physical Assist/Nonphysical Assist	1 person assist	    Transfer: Sit to Stand:   · Level of [place]	Did not attempt this session secondary to patient becoming SOB while doing therex at [employer].	    Clinical Impressions:   · Criteria for Skilled Therapeutic Interventions	impairments found	  · Impairments Found (describe specific impairments)	aerobic capacity/endurance; gait, locomotion, and balance; muscle strength; posture	  · Functional Limitations in Following Categories (describe specific limitations)	self-care; home management; community/leisure	  · Rehab Potential	good, to achieve stated therapy goals	  · Therapy Frequency	2-3x/week	    PM&R Impression: as above    Disposition Plan Recommendations: d/c home, home physical therapy

## 2017-02-01 NOTE — PROGRESS NOTE ADULT - ASSESSMENT
62 y/o F w/ PMH of anemia, asthma, DVT, MS, multiple SBO's s/p multiple abdominal surgeries, and recent CVA, initially admitted for SOB and cough, w/ HCAP, asthma exacerbation, RLE DVT, and leukopenia awaiting BM biopsy tomorrow.    Problem/Plan - 1:  ·  Problem: Shortness of breath.  Plan: Stable - on 2L NC - Multifactorial - Associated with cough, found to have tachycardic and low BP in ED, concerning for PNA on underlying influenza (RVP +ve for Inlfuenza). CTPE performed given hx of DVT, however, negative for PE and DUS of LE unremarkable for DVTs. CT-chest remarkable for mucous plugging, and a left sided infiltrate with possible parapneumonic effusion present. Pt also had an episode of wheezing, w/ hx of asthma, requiring 40mg Solumedrol and DuoNeb. Pt w/ nomral LV function w/ EF of 55-60%. Nomral RV w/ dilated RA. No pulm HTN, however, remarkable for aortic valvular abnormality, concerning for vegetation.  - f/u ADRYAN  - f/u BM Biopsy tomorrow  - c/w Vancomycin and Zosyn for likely HCAP (Day 7) - f/u Vanc trough tonight  - c/w Duonebs [id number]  - c/w Pulmicort daily  - c/w Solumedrol 40mg IV q8h  - c/w 2L NC for supplemental O2 PRN  - f/u Pulm consult (Dr. Heredia); recs appreciated  - f/u ID consult (Dr. Correa); recs appreciated.     Problem/Plan - 2:  ·  Problem: HCAP (healthcare-associated pneumonia).  Plan: Stable - Pt afebrile, however, w/ leukopenia.- CT-chest remarkable for mucous plugging, and a left sided infiltrate with possible parapneumonic effusion present.   - c/w Vanc/Zosyn (day 6)   - f/u Blood cx - NGTD  - f/u Sputum culture.     Problem/Plan - 3:  ·  Problem: CVA (cerebral vascular accident).  Plan: Per patient she had a brain bleed 4 weeks ago and was admitted at [facility] for 4-6 weeks. Lower extremity weakness and numbness is stable since admission for stroke per patient. CT head unremarkable for any acute pathology or chronic infarcts.   - f/u Neurology consult; recs appreciated  - c/w home keppra 1000mg bid for seizure prophylaxis.

## 2017-02-01 NOTE — DISCHARGE NOTE ADULT - PROVIDER TOKENS
TOKEN:'[id number]',TOKEN:'[id number]' TOKEN:'[id number]:'[id number]:'[id number]'

## 2017-02-01 NOTE — PROVIDER CONTACT NOTE (CHANGE IN STATUS NOTIFICATION) - ACTION/TREATMENT ORDERED:
EKG done at bedside by Dr. Stoll. After nebulizer treatment pt verbalized relief. At 2240 T99 HR99 /72 RR18 O2 sat 100% RA. No further distress noted.

## 2017-02-01 NOTE — PROGRESS NOTE ADULT - ASSESSMENT
Influenza A virus infection  Bacterial pneumonia  On steroids  Chronic illness and debilitation    RECOMMEND  Continue Oseltamivir for 7-10 days  OK to change IV abx to PO Levofloxacin to complete 14 day-course of abx

## 2017-02-01 NOTE — DISCHARGE NOTE ADULT - SECONDARY DIAGNOSIS.
DVT (deep venous thrombosis) Chronic pain Asthma with acute exacerbation, unspecified asthma severity Leukopenia Anemia

## 2017-02-01 NOTE — PROGRESS NOTE ADULT - SUBJECTIVE AND OBJECTIVE BOX
INTERVAL HPI: overnight events noted for sob, now s/p sunita comfortable no sob palp cp  	  MEDICATIONS:      ALBUTerol/ipratropium for Nebulization 3milliLiter(s) Nebulizer every 4 hours  buDESOnide  180 MICROgram(s) Inhaler 1Puff(s) Inhalation daily    levETIRAcetam 1000milliGRAM(s) Oral two times a day  zolpidem 5milliGRAM(s) Oral at bedtime PRN  gabapentin 300milliGRAM(s) Oral daily  LORazepam     Tablet 0.5milliGRAM(s) Oral every 8 hours PRN  methadone 10milliGRAM(s) Oral every 8 hours  acetaminophen   Tablet. 650milliGRAM(s) Oral every 6 hours PRN    pantoprazole    Tablet 40milliGRAM(s) Oral before breakfast  polyethylene glycol 3350 17Gram(s) Oral two times a day  aluminum hydroxide/magnesium hydroxide/simethicone Suspension 30milliLiter(s) Oral every 4 hours PRN    insulin lispro (HumaLOG) corrective regimen sliding scale  SubCutaneous Before meals and at bedtime  methylPREDNISolone sodium succinate Injectable 40milliGRAM(s) IV Push every 8 hours    ferrous    sulfate 325milliGRAM(s) Oral daily  enoxaparin Injectable 40milliGRAM(s) SubCutaneous every 12 hours  ergocalciferol 25077Ukup(s) Oral every week      REVIEW OF SYSTEMS:  [x] As per HPI  CONSTITUTIONAL: No fever, weight loss, or fatigue  RESPIRATORY: No cough, wheezing, chills or hemoptysis; No Shortness of Breath  CARDIOVASCULAR: No chest pain, palpitations, dizziness, or leg swelling  GASTROINTESTINAL: No abdominal or epigastric pain. No nausea, vomiting, or hematemesis; No diarrhea or constipation. No melena or hematochezia.  MUSCULOSKELETAL: No joint pain or swelling; No muscle, back, or extremity pain  [x] All others negative	  [ ] Unable to obtain    PHYSICAL EXAM:  T(C): 36.2, Max: 37.5 (01-31 @ 20:59)  HR: 87 (83 - 115)  BP: 101/65 (101/65 - 117/74)  RR: 16 (16 - 18)  SpO2: 99% (99% - 100%)  Wt(kg): --  I&O's Summary    I & Os for current day (as of 01 Feb 2017 16:50)  =============================================  IN: 450 ml / OUT: 600 ml / NET: -150 ml        Appearance: Normal	  HEENT:   Normal oral mucosa  Cardiovascular: Normal S1 S2, No JVD, No murmurs, No edema  Respiratory: Lungs clear to auscultation	  Gastrointestinal:  Soft, Non-tender, + BS	  Extremities: Normal range of motion, No clubbing, cyanosis or edema  Vascular: Peripheral pulses palpable 2+ bilaterally    TELEMETRY: 	    ECG:    	  RADIOLOGY:   CXR:  CT:  US:    CARDIAC TESTING:  Echocardiogram:Normal left ventricular size and wall thickness. The left ventricular wall   motion is normal.  The left ventricular ejection fraction is normal. The   left   ventricular ejection fraction is 60%.  The left atrial size is normal.   Right   atrial size is normal.The interatrial septum is intact with no color   Doppler   evidence for interatrial shunting  No clot seen in the left atrium or in   the   left atrial appendage.The right ventricle is normal in size and   function.Small   focal calcification noted on the non-coronary cusp of the aortic valve.   No   other masses identified.  No aortic regurgitation noted.There is mild   mitral   valve thickening. There is mild mitral regurgitation.Mild atherosclerotic   plaque(s) in the descending aorta. Mild atherosclerotic plaque(s) in the   aortic arch.There is no pericardial effusion.  Catheterization:  Stress Test:      LABS:	 	    CARDIAC MARKERS:                  TPro  6.5    /  Alb  x      /  TBili  x      /  DBili  x      /  AST  x      /  ALT  x      /  AlkPhos  x      31 Jan 2017 22:55    proBNP:   Lipid Profile:   HgA1c:   TSH:     ASSESSMENT/PLAN: 	   AO mbile mass - SUNITA clarification of mass as focal calcification, no thrombus, not mobile. would recommend high dose statin therapy at this time  HTN - at goal, observe  SOB - mucous plugging no PE  Hx DVT - dopplers neg  replete electrolytes

## 2017-02-01 NOTE — PROGRESS NOTE ADULT - SUBJECTIVE AND OBJECTIVE BOX
OVERNIGHT EVENTS: Complained of abdominal pain. s/p Mylanta.     SUBJECTIVE: Complains of leg pain.     VITAL SIGNS: Last 24 Hrs  T(F): 97.2, Max: 99.5 (01-31 @ 20:59)  HR: 87 (83 - 115)  BP: 101/65 (101/65 - 117/74)  RR: 16 (16 - 18)  SpO2: 99% (99% - 100%)    CAPILLARY BLOOD GLUCOSE  90 (17:51)  125 (15:19)  104 (07:29)  138 (22:07)    PHYSICAL EXAM:  Constitutional: WDWN, moderate distress due to pain  HEENT: sclera non-icteric, right scleral hemorrhage, no conjunctival pallor, dry MM  Respiratory: bibasilar crackles, no wheezes, no rhonchi  Cardiovascular: RRR, normal s1/s2, no MRG  Gastrointestinal: soft, ND, +BS, diffuse abdominal tenderness, voluntary guarding, no organomegaly  Extremities: WWP, DP/radial pulses 2+ b/l, no edema  Neurological: AAOx 3, responds to commands, moves all extremities, CN 2-12 intact    MEDICATIONS  (STANDING):  insulin lispro (HumaLOG) corrective regimen sliding scale  SubCutaneous Before meals and at bedtime  ALBUTerol/ipratropium for Nebulization 3milliLiter(s) Nebulizer every 4 hours  levETIRAcetam 1000milliGRAM(s) Oral two times a day  ferrous    sulfate 325milliGRAM(s) Oral daily  pantoprazole    Tablet 40milliGRAM(s) Oral before breakfast  buDESOnide  180 MICROgram(s) Inhaler 1Puff(s) Inhalation daily  polyethylene glycol 3350 17Gram(s) Oral two times a day  gabapentin 300milliGRAM(s) Oral daily  enoxaparin Injectable 40milliGRAM(s) SubCutaneous every 12 hours  methadone 10milliGRAM(s) Oral every 8 hours  ergocalciferol 20933Bnup(s) Oral every week  methylPREDNISolone sodium succinate Injectable 40milliGRAM(s) IV Push every 8 hours    MEDICATIONS  (PRN):  zolpidem 5milliGRAM(s) Oral at bedtime PRN Insomnia  LORazepam     Tablet 0.5milliGRAM(s) Oral every 8 hours PRN Anxiety  acetaminophen   Tablet. 650milliGRAM(s) Oral every 6 hours PRN Moderate Pain (4 - 6)  aluminum hydroxide/magnesium hydroxide/simethicone Suspension 30milliLiter(s) Oral every 4 hours PRN Dyspepsia    ALLERGIES: dust (Unknown)  latex (Unknown)  No Known Drug Allergies  Nuts (Anaphylaxis)  peanuts (Unknown)    LABS:  ( 01 Feb 2017 17:35 )                        9.8    4.4   )-----------( 474                  32.0

## 2017-02-01 NOTE — DISCHARGE NOTE ADULT - PATIENT PORTAL LINK FT
“You can access the [employer] Patient Portal, offered by [facility], by registering with the following website: [email or web address]”

## 2017-02-01 NOTE — PROGRESS NOTE ADULT - SUBJECTIVE AND OBJECTIVE BOX
Patient is a 61y old  Female who presents with a chief complaint of shortness of breath (26 Jan 2017 02:21)      HPI:  Pt is a 61 year old female with PMH of anemia, asthma, DVT with noncompliance with AC, MS, multiple SBO's s/p multiple abdominal surgeries and recent CVA admitted to [facility] about 4 weeks ago for 4-6weeks who presented to the ED today after being sent in from Dr. Garay's office for tachycardia, low blood pressure and complaint of cough and sob.  Pt stated she has been having a cough with yellow phlegm since Last friday along with f/c/s and congestion and myalgias.  Pt stated her sister also was sick last week with similar symptoms. She denied wheezing at home or increased use of her inhalers.  Pt also stated she was admitted at [facility] about 4 weeks ago for a stroke that she had and was told she had a brain bleed but unclear if actual brain bleed as patient stated she was also taking lovenox at home.  Since her stroke about 4 weeks ago the patient has admitted to having continued lower extremity weakness and numbness in her toes up to mid shin with difficulty walking and getting out of bed.  Pt denied back pain.  She also stated she has had multiple SBO's about 6 of them and that she hasn't had a BM in about one week and has had a diffuse cramping abdominal pain for the last few days with an episode of nausea last week, but no vomiting.  She admitted to decreased PO intake at home.  In the ED patient had a CT angio to r/o PE and vital signs were Tmax: 99.0, HR: , BP 98/61, RR 18 and sO2 of 92% room air and 98% on 2L NC. (25 Jan 2017 22:05)    INTERVAL HPI/OVERNIGHT EVENTS:::    HEALTH ISSUES - PROBLEM Dx:  Chronic pain: Chronic pain  Asthma: Asthma  Influenza: Influenza  HCAP (healthcare-associated pneumonia): HCAP (healthcare-associated pneumonia)  Nutrition, metabolism, and development symptoms: Nutrition, metabolism, and development symptoms  Prophylactic measure: Prophylactic measure  Anemia: Anemia  Small bowel obstruction: Small bowel obstruction  MS (multiple sclerosis): MS (multiple sclerosis)  Uncomplicated asthma, unspecified asthma severity: Uncomplicated asthma, unspecified asthma severity  CVA (cerebral vascular accident): CVA (cerebral vascular accident)  History of DVT (deep vein thrombosis): History of DVT (deep vein thrombosis)  Shortness of breath: Shortness of breath          PAST MEDICAL & SURGICAL HISTORY:  CVA (cerebral vascular accident)  Anemia  G tube feedings  History of DVT (deep vein thrombosis)  MS (multiple sclerosis)  Small bowel obstruction: multiple, recurrent  Asthma  SBO (small bowel obstruction): multiple          Consultant NOTE  REVIEWED  (   )    REVIEW OF SYSTEMS:  [x] As per HPI  CONSTITUTIONAL: No fever, weight loss, or fatigue  RESPIRATORY: No cough, wheezing, chills or hemoptysis; No Shortness of Breath  CARDIOVASCULAR: No chest pain, palpitations, dizziness, or leg swelling  GASTROINTESTINAL: No abdominal or epigastric pain. No nausea, vomiting, or hematemesis; No diarrhea or constipation. No melena or hematochezia.  MUSCULOSKELETAL: No joint pain or swelling; No muscle, back, or extremity pain  PSYCH    awake, alert       [x] All others negative	  [ ] Unable to obtain          Vital Signs Last 24 Hrs  T(C): 35.9, Max: 37.5 (01-31 @ 20:59)  T(F): 96.7, Max: 99.5 (01-31 @ 20:59)  HR: 83 (81 - 115)  BP: 117/74 (106/75 - 118/81)  BP(mean): --  RR: 17 (16 - 18)  SpO2: 100% (95% - 100%)        I & Os for current day (as of 02-01 @ 08:43)  =============================================  IN: 450 ml / OUT: 600 ml / NET: -150 ml    PHYSICAL EXAMINATION:                                    (    )  NO CHANGE  Appearance: Normal	  HEENT:   Normal oral mucosa, PERRL, EOMI	  Neck: Supple, + JVD/ - JVD; Carotid Bruit   Cardiovascular: Normal S1 S2, No JVD, No murmurs,   Respiratory: Lungs clear to auscultation/Decreased Breath Sounds/No Rales, Rhonchi, Wheezing	  Gastrointestinal:  Soft, Non-tender, + BS	  Skin: No rashes, No ecchymoses, No cyanosis  Extremities: Normal range of motion, No clubbing, cyanosis or edema  Vascular: Peripheral pulses palpable 2+ bilaterally  Neurologic: Non-focal  Psychiatry: A & O x 3, Mood & affect appropriate    piperacillin/tazobactam IVPB.  IV Intermittent   piperacillin/tazobactam IVPB. 4.5Gram(s) IV Intermittent every 6 hours  insulin lispro (HumaLOG) corrective regimen sliding scale  SubCutaneous Before meals and at bedtime  ALBUTerol/ipratropium for Nebulization 3milliLiter(s) Nebulizer every 4 hours  levETIRAcetam 1000milliGRAM(s) Oral two times a day  ferrous    sulfate 325milliGRAM(s) Oral daily  pantoprazole    Tablet 40milliGRAM(s) Oral before breakfast  buDESOnide  180 MICROgram(s) Inhaler 1Puff(s) Inhalation daily  methylPREDNISolone sodium succinate Injectable 40milliGRAM(s) IV Push every 8 hours  polyethylene glycol 3350 17Gram(s) Oral two times a day  zolpidem 5milliGRAM(s) Oral at bedtime PRN  gabapentin 300milliGRAM(s) Oral daily  LORazepam     Tablet 0.5milliGRAM(s) Oral every 8 hours PRN  enoxaparin Injectable 40milliGRAM(s) SubCutaneous every 12 hours  methadone 10milliGRAM(s) Oral every 8 hours  acetaminophen   Tablet. 650milliGRAM(s) Oral every 6 hours PRN  aluminum hydroxide/magnesium hydroxide/simethicone Suspension 30milliLiter(s) Oral every 4 hours PRN  vancomycin  IVPB  IV Intermittent   vancomycin  IVPB 1250milliGRAM(s) IV Intermittent every 12 hours  ergocalciferol 35850Hvrn(s) Oral every week            CARDIAC MARKERS ( 31 Jan 2017 12:54 )  x     / x     / 72 U/L / x     / x                TPro  6.5    /  Alb  x      /  TBili  x      /  DBili  x      /  AST  x      /  ALT  x      /  AlkPhos  x      31 Jan 2017 22:55      CAPILLARY BLOOD GLUCOSE  104 (01 Feb 2017 07:29)  138 (31 Jan 2017 22:07)  155 (31 Jan 2017 17:57)  79 (31 Jan 2017 11:16) Patient is a 61y old  Female who presents with a chief complaint of shortness of breath (26 Jan 2017 02:21)      HPI:  Pt is a 61 year old female with PMH of anemia, asthma, DVT with noncompliance with AC, MS, multiple SBO's s/p multiple abdominal surgeries and recent CVA admitted to [facility] about 4 weeks ago for 4-6weeks who presented to the ED today after being sent in from Dr. Garay's office for tachycardia, low blood pressure and complaint of cough and sob.  Pt stated she has been having a cough with yellow phlegm since Last friday along with f/c/s and congestion and myalgias.  Pt stated her sister also was sick last week with similar symptoms. She denied wheezing at home or increased use of her inhalers.  Pt also stated she was admitted at [facility] about 4 weeks ago for a stroke that she had and was told she had a brain bleed but unclear if actual brain bleed as patient stated she was also taking lovenox at home.  Since her stroke about 4 weeks ago the patient has admitted to having continued lower extremity weakness and numbness in her toes up to mid shin with difficulty walking and getting out of bed.  Pt denied back pain.  She also stated she has had multiple SBO's about 6 of them and that she hasn't had a BM in about one week and has had a diffuse cramping abdominal pain for the last few days with an episode of nausea last week, but no vomiting.  She admitted to decreased PO intake at home.  In the ED patient had a CT angio to r/o PE and vital signs were Tmax: 99.0, HR: , BP 98/61, RR 18 and sO2 of 92% room air and 98% on 2L NC. (25 Jan 2017 22:05)    INTERVAL HPI/OVERNIGHT EVENTS:::uneventful, on isolation    HEALTH ISSUES - PROBLEM Dx:  Chronic pain: Chronic pain  Asthma: Asthma  Influenza: Influenza  HCAP (healthcare-associated pneumonia): HCAP (healthcare-associated pneumonia)  Nutrition, metabolism, and development symptoms: Nutrition, metabolism, and development symptoms  Prophylactic measure: Prophylactic measure  Anemia: Anemia  Small bowel obstruction: Small bowel obstruction  MS (multiple sclerosis): MS (multiple sclerosis)  Uncomplicated asthma, unspecified asthma severity: Uncomplicated asthma, unspecified asthma severity  CVA (cerebral vascular accident): CVA (cerebral vascular accident)  History of DVT (deep vein thrombosis): History of DVT (deep vein thrombosis)  Shortness of breath: Shortness of breath          PAST MEDICAL & SURGICAL HISTORY:  CVA (cerebral vascular accident)  Anemia  G tube feedings  History of DVT (deep vein thrombosis)  MS (multiple sclerosis)  Small bowel obstruction: multiple, recurrent  Asthma  SBO (small bowel obstruction): multiple          Consultant NOTE  REVIEWED  (   )    REVIEW OF SYSTEMS:  [x] As per HPI  UNCHANGED  CONSTITUTIONAL: No fever, weight loss, or fatigue  RESPIRATORY: cough  CARDIOVASCULAR: No chest pain, palpitations, dizziness, or leg swelling  GASTROINTESTINAL: No abdominal or epigastric pain. No nausea, vomiting, or hematemesis; No diarrhea or constipation. No melena or hematochezia.  MUSCULOSKELETAL: No joint pain or swelling; No muscle, back, or extremity pain  PSYCH    awake, alert       [x] All others negative	  [ ] Unable to obtain          Vital Signs Last 24 Hrs  T(C): 35.9, Max: 37.5 (01-31 @ 20:59)  T(F): 96.7, Max: 99.5 (01-31 @ 20:59)  HR: 83 (81 - 115)  BP: 117/74 (106/75 - 118/81)  BP(mean): --  RR: 17 (16 - 18)  SpO2: 100% (95% - 100%)        I & Os for current day (as of 02-01 @ 08:43)  =============================================  IN: 450 ml / OUT: 600 ml / NET: -150 ml    PHYSICAL EXAMINATION:                                    (   ++ )  NO CHANGE  Appearance: Normal	  HEENT:   Normal oral mucosa, PERRL, EOMI	  Neck: Supple, + JVD/ - JVD; Carotid Bruit   Cardiovascular: Normal S1 S2, No JVD, No murmurs,   Respiratory: Lungs clear to auscultation/Decreased Breath Sounds/No Rales, Rhonchi, Wheezing	  Gastrointestinal:  Soft, Non-tender, + BS	  Skin: No rashes, No ecchymoses, No cyanosis  Extremities: Normal range of motion, No clubbing, cyanosis or edema  Vascular: Peripheral pulses palpable 2+ bilaterally  Neurologic: Non-focal  Psychiatry: A & O x 3, Mood & affect appropriate    piperacillin/tazobactam IVPB.  IV Intermittent   piperacillin/tazobactam IVPB. 4.5Gram(s) IV Intermittent every 6 hours  insulin lispro (HumaLOG) corrective regimen sliding scale  SubCutaneous Before meals and at bedtime  ALBUTerol/ipratropium for Nebulization 3milliLiter(s) Nebulizer every 4 hours  levETIRAcetam 1000milliGRAM(s) Oral two times a day  ferrous    sulfate 325milliGRAM(s) Oral daily  pantoprazole    Tablet 40milliGRAM(s) Oral before breakfast  buDESOnide  180 MICROgram(s) Inhaler 1Puff(s) Inhalation daily  methylPREDNISolone sodium succinate Injectable 40milliGRAM(s) IV Push every 8 hours  polyethylene glycol 3350 17Gram(s) Oral two times a day  zolpidem 5milliGRAM(s) Oral at bedtime PRN  gabapentin 300milliGRAM(s) Oral daily  LORazepam     Tablet 0.5milliGRAM(s) Oral every 8 hours PRN  enoxaparin Injectable 40milliGRAM(s) SubCutaneous every 12 hours  methadone 10milliGRAM(s) Oral every 8 hours  acetaminophen   Tablet. 650milliGRAM(s) Oral every 6 hours PRN  aluminum hydroxide/magnesium hydroxide/simethicone Suspension 30milliLiter(s) Oral every 4 hours PRN  vancomycin  IVPB  IV Intermittent   vancomycin  IVPB 1250milliGRAM(s) IV Intermittent every 12 hours  ergocalciferol 69906Lxjo(s) Oral every week            CARDIAC MARKERS ( 31 Jan 2017 12:54 )  x     / x     / 72 U/L / x     / x                TPro  6.5    /  Alb  x      /  TBili  x      /  DBili  x      /  AST  x      /  ALT  x      /  AlkPhos  x      31 Jan 2017 22:55      CAPILLARY BLOOD GLUCOSE  104 (01 Feb 2017 07:29)  138 (31 Jan 2017 22:07)  155 (31 Jan 2017 17:57)  79 (31 Jan 2017 11:16)

## 2017-02-01 NOTE — DISCHARGE NOTE ADULT - CARE PROVIDER_API CALL
Rajendra Garay), Medicine  [facility] [place] [address]  Phone: [phone]  Fax: [phone]    Slick Heredia), Critical Care Medicine; Pulmonary Disease  [address]  [place] [address]  Phone: [phone]  Fax: [phone] Rajendra Garay), Medicine  [address] [place] [address]  Phone: [phone]  Fax: [phone]    Slick Heredia (MD), Critical Care Medicine; Pulmonary Disease  [address]  [place] [address]  Phone: [phone]  Fax: [phone]    Rios Burgos), Medicine  [address] [place] [address]  Phone: [phone]  Fax: [phone]

## 2017-02-01 NOTE — PROGRESS NOTE ADULT - ASSESSMENT
62 y/o F w/ PMH of anemia, asthma, DVT, MS, multiple SBO's s/p multiple abdominal surgeries, and recent CVA, initially admitted for SOB and cough, w/ HCAP, asthma exacerbation, RLE DVT, and leukopenia s/p BM biopsy today.

## 2017-02-02 LAB
% ALBUMIN: 54.2 % — SIGNIFICANT CHANGE UP
% ALPHA 1: 4.4 % — SIGNIFICANT CHANGE UP
% ALPHA 2: 8.7 % — SIGNIFICANT CHANGE UP
% BETA: 11.4 % — SIGNIFICANT CHANGE UP
% GAMMA: 21.3 % — SIGNIFICANT CHANGE UP
ALBUMIN SERPL ELPH-MCNC: 3.5 G/DL — LOW (ref 3.6–5.5)
ALBUMIN/GLOB SERPL ELPH: 1.2 RATIO — SIGNIFICANT CHANGE UP
ALDOLASE SERPL-CCNC: 6.3 U/L — SIGNIFICANT CHANGE UP (ref 3.3–10.3)
ALPHA1 GLOB SERPL ELPH-MCNC: 0.3 G/DL — SIGNIFICANT CHANGE UP (ref 0.1–0.4)
ALPHA2 GLOB SERPL ELPH-MCNC: 0.6 G/DL — SIGNIFICANT CHANGE UP (ref 0.5–1)
ANA TITR SER: NEGATIVE — SIGNIFICANT CHANGE UP
B-GLOBULIN SERPL ELPH-MCNC: 0.7 G/DL — SIGNIFICANT CHANGE UP (ref 0.5–1)
CONFIRM APTT STACLOT: NEGATIVE — SIGNIFICANT CHANGE UP
CULTURE RESULTS: SIGNIFICANT CHANGE UP
CULTURE RESULTS: SIGNIFICANT CHANGE UP
DRVVT SCREEN TO CONFIRM RATIO: SIGNIFICANT CHANGE UP
GAMMA GLOBULIN: 1.4 G/DL — SIGNIFICANT CHANGE UP (ref 0.6–1.6)
INTERPRETATION SERPL IFE-IMP: SIGNIFICANT CHANGE UP
LA NT DPL PPP QL: 28.9 SEC — SIGNIFICANT CHANGE UP
PROT PATTERN SERPL ELPH-IMP: SIGNIFICANT CHANGE UP
SPECIMEN SOURCE: SIGNIFICANT CHANGE UP
SPECIMEN SOURCE: SIGNIFICANT CHANGE UP
TOTAL HEM COMP BLD-ACNC: 51 U/ML — SIGNIFICANT CHANGE UP (ref 42–60)

## 2017-02-02 PROCEDURE — 71010: CPT | Mod: 26

## 2017-02-02 PROCEDURE — 99232 SBSQ HOSP IP/OBS MODERATE 35: CPT

## 2017-02-02 PROCEDURE — 94010 BREATHING CAPACITY TEST: CPT | Mod: 26

## 2017-02-02 RX ORDER — ZOLPIDEM TARTRATE 10 MG/1
1 TABLET ORAL
Qty: 0 | Refills: 0 | COMMUNITY
Start: 2017-02-02

## 2017-02-02 RX ORDER — LEVETIRACETAM 250 MG/1
1 TABLET, FILM COATED ORAL
Qty: 0 | Refills: 0 | COMMUNITY
Start: 2017-02-02

## 2017-02-02 RX ORDER — KETOROLAC TROMETHAMINE 30 MG/ML
30 SYRINGE (ML) INJECTION ONCE
Qty: 0 | Refills: 0 | Status: DISCONTINUED | OUTPATIENT
Start: 2017-02-02 | End: 2017-02-02

## 2017-02-02 RX ORDER — ATORVASTATIN CALCIUM 80 MG/1
1 TABLET, FILM COATED ORAL
Qty: 0 | Refills: 0 | COMMUNITY
Start: 2017-02-02

## 2017-02-02 RX ORDER — CIPROFLOXACIN LACTATE 400MG/40ML
1 VIAL (ML) INTRAVENOUS
Qty: 6 | Refills: 0 | OUTPATIENT
Start: 2017-02-02 | End: 2017-02-08

## 2017-02-02 RX ORDER — IBUPROFEN 200 MG
400 TABLET ORAL ONCE
Qty: 0 | Refills: 0 | Status: COMPLETED | OUTPATIENT
Start: 2017-02-02 | End: 2017-02-03

## 2017-02-02 RX ADMIN — Medication 1: at 18:06

## 2017-02-02 RX ADMIN — Medication 30 MILLIGRAM(S): at 19:06

## 2017-02-02 RX ADMIN — Medication 30 MILLIGRAM(S): at 19:30

## 2017-02-02 RX ADMIN — ZOLPIDEM TARTRATE 5 MILLIGRAM(S): 10 TABLET ORAL at 21:59

## 2017-02-02 RX ADMIN — Medication 3 MILLILITER(S): at 10:34

## 2017-02-02 RX ADMIN — POLYETHYLENE GLYCOL 3350 17 GRAM(S): 17 POWDER, FOR SOLUTION ORAL at 17:50

## 2017-02-02 RX ADMIN — METHADONE HYDROCHLORIDE 10 MILLIGRAM(S): 40 TABLET ORAL at 14:14

## 2017-02-02 RX ADMIN — Medication 30 MILLIGRAM(S): at 15:35

## 2017-02-02 RX ADMIN — Medication 325 MILLIGRAM(S): at 11:47

## 2017-02-02 RX ADMIN — Medication 3 MILLILITER(S): at 02:37

## 2017-02-02 RX ADMIN — Medication 30 MILLIGRAM(S): at 16:30

## 2017-02-02 RX ADMIN — Medication 3 MILLILITER(S): at 21:58

## 2017-02-02 RX ADMIN — GABAPENTIN 300 MILLIGRAM(S): 400 CAPSULE ORAL at 11:47

## 2017-02-02 RX ADMIN — ENOXAPARIN SODIUM 40 MILLIGRAM(S): 100 INJECTION SUBCUTANEOUS at 17:49

## 2017-02-02 RX ADMIN — Medication 40 MILLIGRAM(S): at 15:35

## 2017-02-02 RX ADMIN — Medication 3 MILLILITER(S): at 14:14

## 2017-02-02 RX ADMIN — Medication 1 PUFF(S): at 11:47

## 2017-02-02 RX ADMIN — Medication 75 MILLIGRAM(S): at 17:50

## 2017-02-02 RX ADMIN — Medication 50 MILLIGRAM(S): at 22:00

## 2017-02-02 RX ADMIN — ATORVASTATIN CALCIUM 40 MILLIGRAM(S): 80 TABLET, FILM COATED ORAL at 21:59

## 2017-02-02 RX ADMIN — LEVETIRACETAM 1000 MILLIGRAM(S): 250 TABLET, FILM COATED ORAL at 17:50

## 2017-02-02 RX ADMIN — Medication 3 MILLILITER(S): at 17:49

## 2017-02-02 RX ADMIN — METHADONE HYDROCHLORIDE 10 MILLIGRAM(S): 40 TABLET ORAL at 22:00

## 2017-02-02 NOTE — PROGRESS NOTE ADULT - PROBLEM SELECTOR PROBLEM 2
HCAP (healthcare-associated pneumonia)
History of DVT (deep vein thrombosis)
R/O Anemia
Small bowel obstruction
Small bowel obstruction

## 2017-02-02 NOTE — PROGRESS NOTE ADULT - PROBLEM SELECTOR PROBLEM 8
Anemia
R/O Anemia
Nutrition, metabolism, and development symptoms

## 2017-02-02 NOTE — PROGRESS NOTE ADULT - PROBLEM SELECTOR PLAN 9
F: None  E: Replete electrolytes PRN  N: c/w DASH/TLC diet
DVT PPx: start HSQ     Dispo: Pending clinical improvement    FULL CODE

## 2017-02-02 NOTE — PROGRESS NOTE ADULT - ASSESSMENT
60 y/o F w/ PMH of anemia, asthma, DVT, MS, multiple SBO's s/p multiple abdominal surgeries, and recent CVA, initially admitted for SOB and cough, w/ HCAP, asthma exacerbation, RLE DVT, and leukopenia s/p BM biopsy, w/ acute asthma exacerbation.

## 2017-02-02 NOTE — PROGRESS NOTE ADULT - PROBLEM SELECTOR PLAN 4
Pt w/ hx of Chronic pain. On Methadone at home for pain control. Pt persistently complaining of generalized pain, in the setting of DVT, Asthma exacerbation, anxiety, hx of MS, and possible opioid dependency.  - c/w Methadone 10mg q8h  - f/u Pain management consult; recs appreciated
per patient she had a brain bleed 4 weeks ago and was admitted at [facility] for 4-6 weeks. Lower extremity weakness and numbness is stable since admission for stroke per patient. CT head unremarkable for any acute pathology or chronic infarcts.   - will need to obtain collateral information  - f/u Neurology consult; recs appreciated  - c/w home keppra 1000mg bid for seizure prophylaxis
per patient she had a brain bleed 4 weeks ago and was admitted at [facility] for 4-6 weeks. Lower extremity weakness and numbness is stable since admission for stroke per patient. CT head unremarkable for any acute pathology or chronic infarcts.   - will need to obtain collateral information  - f/u Neurology consult; recs appreciated  - c/w home keppra 1000mg bid for seizure prophylaxis
per patient she had a brain bleed 4 weeks ago and was admitted at [facility] for 4-6 weeks. Lower extremity weakness and numbness is stable since admission for stroke per patient. CT head unremarkable for any acute pathology or chronic infarcts.   - will need to obtain collateral information  - f/u Neurology consult; recs appreciated  - c/w home keppra 1000mg bid for seizure prophylaxis
per patient she had a brain bleed 4 weeks ago and was admitted at [facility] for 4-6 weeks. Lower extremity weakness and numbness is stable since admission for stroke per patient. CT head unremarkable for any acute pathology or chronic infarcts.   - will need to obtain collateral information  - f/u Neurology consult; recs appreciated  - c/w home keppra 1000mg bid for seizure prophylaxis
per patient she had a brain bleed 4 weeks ago and was admitted at [facility] for 4-6 weeks. Lower extremity weakness and numbness is stable since admission for stroke per patient. CT head unremarkable for any acute pathology or chronic infarcts.   - will need to obtain collateral information  - f/u Neurology consult; recs appreciated  - c/w home keppra 1000mg bid for seizure prophylaxis
per patient she had a brain bleed 4 weeks ago and was admitted at [facility] for 4-6 weeks. Lower extremity weakness and numbness is stable since admission for stroke per patient. CT head unremarkable for any acute pathology or chronic infarcts.   - will need to obtain collateral information  - f/u Neurology consult; recs appreciated  - c/w home keppra 1000mg bid for seizure prophylaxis
Not wheezing on exam and hadn't received steroids or nebulizers prior.  s/p Solumedrol 125mg in the ED, however, pt does not appear to be in asthma exacerbation.  - c/w Connor [id number]  - f/u Pulmonology consult; recs appreciated

## 2017-02-02 NOTE — PROGRESS NOTE ADULT - PROBLEM SELECTOR PLAN 2
Hgb 9.6 stable, GI workup when all other issues better
Stable - Pt afebrile, however, w/ leukopenia.- CT-chest remarkable for mucous plugging, and a left sided infiltrate with possible parapneumonic effusion present.   - c/w Levaquin daily (day 9)  - f/u Blood cx - NGTD  - f/u Sputum culture
Stable - Pt afebrile, however, w/ leukopenia.- CT-chest remarkable for mucous plugging, and a left sided infiltrate with possible parapneumonic effusion present.   - c/w Vanc/Zosyn (day 3)  - f/u Blood cx - NGTD
Stable - Pt afebrile, however, w/ leukopenia.- CT-chest remarkable for mucous plugging, and a left sided infiltrate with possible parapneumonic effusion present.   - c/w Vanc/Zosyn (day 3)  - f/u Blood cx - NGTD
Stable - Pt afebrile, however, w/ leukopenia.- CT-chest remarkable for mucous plugging, and a left sided infiltrate with possible parapneumonic effusion present.   - c/w Vanc/Zosyn (day 5) - vanc held in the setting of supratherapeutic vanc trough   - f/u Blood cx - NGTD
Stable - Pt afebrile, however, w/ leukopenia.- CT-chest remarkable for mucous plugging, and a left sided infiltrate with possible parapneumonic effusion present.   - c/w Vanc/Zosyn (day 5) - vanc held in the setting of supratherapeutic vanc trough   - f/u Blood cx - NGTD
Stable - Pt afebrile, however, w/ leukopenia.- CT-chest remarkable for mucous plugging, and a left sided infiltrate with possible parapneumonic effusion present.   - c/w Vanc/Zosyn (day 6)   - f/u Blood cx - NGTD  - f/u Sputum culture
Stable - Pt afebrile, however, w/ leukopenia.- CT-chest remarkable for mucous plugging, and a left sided infiltrate with possible parapneumonic effusion present.   - c/w Vanc/Zosyn (day 6) - vanc held in the setting of supratherapeutic vanc trough   - f/u Blood cx - NGTD
Stable - Pt afebrile, however, w/ leukopenia.- CT-chest remarkable for mucous plugging, and a left sided infiltrate with possible parapneumonic effusion present.   - c/w Vanc/Zosyn (day 6) - vanc held in the setting of supratherapeutic vanc trough   - f/u Blood cx - NGTD
Stable - Pt afebrile, however, w/ leukopenia.- CT-chest remarkable for mucous plugging, and a left sided infiltrate with possible parapneumonic effusion present.   - c/w Vanc/Zosyn (day 8)   - f/u Blood cx - NGTD  - f/u Sputum culture
partial, able to eat
per patient h/o DVT in her left leg and previously on xarelto and lovenox. Per Dr. Garay concern that patient may have been taking both xarelto and lovenox at home, will hold AC for now and check CT head prior to restarting AC as patient stated she had a brain bleed 4 weeks ago.  - will monitor patient for signs of bleeding in the setting ?use of dual anticoagulation (marc hunter)  - f/u CT head

## 2017-02-02 NOTE — PROGRESS NOTE ADULT - SUBJECTIVE AND OBJECTIVE BOX
Neurology Follow up note    Name  MARK SNYDER    HPI:  Pt is a 61 year old female with PMH of anemia, asthma, DVT with noncompliance with AC, MS, multiple SBO's s/p multiple abdominal surgeries and recent CVA admitted to [facility] about 4 weeks ago for 4-6weeks who presented to the ED today after being sent in from Dr. Garay's office for tachycardia, low blood pressure and complaint of cough and sob.  Pt stated she has been having a cough with yellow phlegm since Last friday along with f/c/s and congestion and myalgias.  Pt stated her sister also was sick last week with similar symptoms. She denied wheezing at home or increased use of her inhalers.  Pt also stated she was admitted at [facility] about 4 weeks ago for a stroke that she had and was told she had a brain bleed but unclear if actual brain bleed as patient stated she was also taking lovenox at home.  Since her stroke about 4 weeks ago the patient has admitted to having continued lower extremity weakness and numbness in her toes up to mid shin with difficulty walking and getting out of bed.  Pt denied back pain.  She also stated she has had multiple SBO's about 6 of them and that she hasn't had a BM in about one week and has had a diffuse cramping abdominal pain for the last few days with an episode of nausea last week, but no vomiting.  She admitted to decreased PO intake at home.  In the ED patient had a CT angio to r/o PE and vital signs were Tmax: 99.0, HR: , BP 98/61, RR 18 and sO2 of 92% room air and 98% on 2L NC. (25 Jan 2017 22:05)      Interval History - neuro status unchanged- no new weakness, seizures        REVIEW OF SYSTEMS    Vital Signs Last 24 Hrs  T(C): 36.3, Max: 36.4 (02-01 @ 21:10)  T(F): 97.4, Max: 97.6 (02-01 @ 21:10)  HR: 98 (82 - 98)  BP: 97/69 (97/69 - 110/67)  BP(mean): --  RR: 18 (16 - 18)  SpO2: 98% (98% - 99%)    Physical Exam-     Mental Status- intermittent confused    Cranial Nerves- no diplopia    Gait and station-n/a    Motor- moves all 4 extremities    Reflexes- decreased    Sensation- no sensory level    Coordination- no tremors    Vascular -    Medications  insulin lispro (HumaLOG) corrective regimen sliding scale  SubCutaneous Before meals and at bedtime  ALBUTerol/ipratropium for Nebulization 3milliLiter(s) Nebulizer every 4 hours  levETIRAcetam 1000milliGRAM(s) Oral two times a day  ferrous    sulfate 325milliGRAM(s) Oral daily  pantoprazole    Tablet 40milliGRAM(s) Oral before breakfast  buDESOnide  180 MICROgram(s) Inhaler 1Puff(s) Inhalation daily  polyethylene glycol 3350 17Gram(s) Oral two times a day  zolpidem 5milliGRAM(s) Oral at bedtime PRN  gabapentin 300milliGRAM(s) Oral daily  LORazepam     Tablet 0.5milliGRAM(s) Oral every 8 hours PRN  enoxaparin Injectable 40milliGRAM(s) SubCutaneous every 12 hours  methadone 10milliGRAM(s) Oral every 8 hours  acetaminophen   Tablet. 650milliGRAM(s) Oral every 6 hours PRN  aluminum hydroxide/magnesium hydroxide/simethicone Suspension 30milliLiter(s) Oral every 4 hours PRN  ergocalciferol 82134Zlwb(s) Oral every week  methylPREDNISolone sodium succinate Injectable 40milliGRAM(s) IV Push every 8 hours  levoFLOXacin  Tablet 750milliGRAM(s) Oral every 24 hours  oseltamivir 75milliGRAM(s) Oral two times a day  atorvastatin 40milliGRAM(s) Oral at bedtime      Lab      Radiology    Assessment- OBS- unchanged    Plan no neuro studies needed

## 2017-02-02 NOTE — PROGRESS NOTE ADULT - PROBLEM SELECTOR PROBLEM 6
MS (multiple sclerosis)
Small bowel obstruction

## 2017-02-02 NOTE — PROGRESS NOTE ADULT - SUBJECTIVE AND OBJECTIVE BOX
OVERNIGHT EVENTS: Pt complained of pain overnight, attempting to leave AMA, however, changed mind later.     SUBJECTIVE: Complains of pain.     VITAL SIGNS: Last 24 Hrs  T(F): 99, Max: 99 (02-02 @ 16:25)  HR: 95 (82 - 98)  BP: 102/68 (97/69 - 111/79)  RR: 16 (16 - 18)  SpO2: 100% (97% - 100%)    CAPILLARY BLOOD GLUCOSE  170 (17:07)  104 (11:24)  85 (07:51)  162 (21:57)  90 (17:51)    PHYSICAL EXAM:  Constitutional: WDWN, moderate distress due to pain  HEENT: sclera non-icteric, right scleral hemorrhage, no conjunctival pallor, dry MM  Respiratory: bibasilar crackles, mild diffuse wheezing, no rhonchi  Cardiovascular: RRR, normal s1/s2, no MRG  Gastrointestinal: soft, ND, +BS, diffuse abdominal tenderness, voluntary guarding, no organomegaly  Extremities: WWP, DP/radial pulses 2+ b/l, no edema  Neurological: AAOx 3, responds to commands, moves all extremities, CN 2-12 intact    MEDICATIONS  (STANDING):  insulin lispro (HumaLOG) corrective regimen sliding scale  SubCutaneous Before meals and at bedtime  ALBUTerol/ipratropium for Nebulization 3milliLiter(s) Nebulizer every 4 hours  levETIRAcetam 1000milliGRAM(s) Oral two times a day  ferrous    sulfate 325milliGRAM(s) Oral daily  pantoprazole    Tablet 40milliGRAM(s) Oral before breakfast  buDESOnide  180 MICROgram(s) Inhaler 1Puff(s) Inhalation daily  polyethylene glycol 3350 17Gram(s) Oral two times a day  gabapentin 300milliGRAM(s) Oral daily  enoxaparin Injectable 40milliGRAM(s) SubCutaneous every 12 hours  methadone 10milliGRAM(s) Oral every 8 hours  ergocalciferol 08861Pyjf(s) Oral every week  levoFLOXacin  Tablet 750milliGRAM(s) Oral every 24 hours  oseltamivir 75milliGRAM(s) Oral two times a day  atorvastatin 40milliGRAM(s) Oral at bedtime  predniSONE   Tablet 50milliGRAM(s) Oral every 12 hours    MEDICATIONS  (PRN):  zolpidem 5milliGRAM(s) Oral at bedtime PRN Insomnia  LORazepam     Tablet 0.5milliGRAM(s) Oral every 8 hours PRN Anxiety  acetaminophen   Tablet. 650milliGRAM(s) Oral every 6 hours PRN Moderate Pain (4 - 6)  aluminum hydroxide/magnesium hydroxide/simethicone Suspension 30milliLiter(s) Oral every 4 hours PRN Dyspepsia    ALLERGIES: dust (Unknown)  latex (Unknown)  No Known Drug Allergies  Nuts (Anaphylaxis)  peanuts (Unknown)    LABS: No labs drawn today

## 2017-02-02 NOTE — PROGRESS NOTE ADULT - SUBJECTIVE AND OBJECTIVE BOX
Patient is a 61y old  Female who presents with a chief complaint of shortness of breath (01 Feb 2017 13:57)      HPI:  Pt is a 61 year old female with PMH of anemia, asthma, DVT with noncompliance with AC, MS, multiple SBO's s/p multiple abdominal surgeries and recent CVA admitted to [facility] about 4 weeks ago for 4-6weeks who presented to the ED today after being sent in from Dr. Garay's office for tachycardia, low blood pressure and complaint of cough and sob.  Pt stated she has been having a cough with yellow phlegm since Last friday along with f/c/s and congestion and myalgias.  Pt stated her sister also was sick last week with similar symptoms. She denied wheezing at home or increased use of her inhalers.  Pt also stated she was admitted at [facility] about 4 weeks ago for a stroke that she had and was told she had a brain bleed but unclear if actual brain bleed as patient stated she was also taking lovenox at home.  Since her stroke about 4 weeks ago the patient has admitted to having continued lower extremity weakness and numbness in her toes up to mid shin with difficulty walking and getting out of bed.  Pt denied back pain.  She also stated she has had multiple SBO's about 6 of them and that she hasn't had a BM in about one week and has had a diffuse cramping abdominal pain for the last few days with an episode of nausea last week, but no vomiting.  She admitted to decreased PO intake at home.  In the ED patient had a CT angio to r/o PE and vital signs were Tmax: 99.0, HR: , BP 98/61, RR 18 and sO2 of 92% room air and 98% on 2L NC. (25 Jan 2017 22:05)    INTERVAL HPI/OVERNIGHT EVENTS:::    HEALTH ISSUES - PROBLEM Dx:  Chronic pain: Chronic pain  Asthma: Asthma  Influenza: Influenza  HCAP (healthcare-associated pneumonia): HCAP (healthcare-associated pneumonia)  Nutrition, metabolism, and development symptoms: Nutrition, metabolism, and development symptoms  Prophylactic measure: Prophylactic measure  Anemia: Anemia  Small bowel obstruction: Small bowel obstruction  MS (multiple sclerosis): MS (multiple sclerosis)  Uncomplicated asthma, unspecified asthma severity: Uncomplicated asthma, unspecified asthma severity  CVA (cerebral vascular accident): CVA (cerebral vascular accident)  History of DVT (deep vein thrombosis): History of DVT (deep vein thrombosis)  Shortness of breath: Shortness of breath          PAST MEDICAL & SURGICAL HISTORY:  CVA (cerebral vascular accident)  Anemia  G tube feedings  History of DVT (deep vein thrombosis)  MS (multiple sclerosis)  Small bowel obstruction: multiple, recurrent  Asthma  SBO (small bowel obstruction): multiple          Consultant NOTE  REVIEWED  (   )    REVIEW OF SYSTEMS:  [x] As per HPI  CONSTITUTIONAL: No fever, weight loss, or fatigue  RESPIRATORY: No cough, wheezing, chills or hemoptysis; No Shortness of Breath  CARDIOVASCULAR: No chest pain, palpitations, dizziness, or leg swelling  GASTROINTESTINAL: No abdominal or epigastric pain. No nausea, vomiting, or hematemesis; No diarrhea or constipation. No melena or hematochezia.  MUSCULOSKELETAL: No joint pain or swelling; No muscle, back, or extremity pain  PSYCH    awake, alert       [x] All others negative	  [ ] Unable to obtain          Vital Signs Last 24 Hrs  T(C): 36.4, Max: 36.4 (02-01 @ 21:10)  T(F): 97.5, Max: 97.6 (02-01 @ 21:10)  HR: 85 (82 - 98)  BP: 111/79 (97/69 - 111/79)  BP(mean): --  RR: 16 (16 - 18)  SpO2: 97% (97% - 99%)        PHYSICAL EXAMINATION:                                    (    )  NO CHANGE  Appearance: Normal	  HEENT:   Normal oral mucosa, PERRL, EOMI	  Neck: Supple, + JVD/ - JVD; Carotid Bruit   Cardiovascular: Normal S1 S2, No JVD, No murmurs,   Respiratory: Lungs clear to auscultation/Decreased Breath Sounds/No Rales, Rhonchi, Wheezing	  Gastrointestinal:  Soft, Non-tender, + BS	  Skin: No rashes, No ecchymoses, No cyanosis  Extremities: Normal range of motion, No clubbing, cyanosis or edema  Vascular: Peripheral pulses palpable 2+ bilaterally  Neurologic: Non-focal  Psychiatry: A & O x 3, Mood & affect appropriate    insulin lispro (HumaLOG) corrective regimen sliding scale  SubCutaneous Before meals and at bedtime  ALBUTerol/ipratropium for Nebulization 3milliLiter(s) Nebulizer every 4 hours  levETIRAcetam 1000milliGRAM(s) Oral two times a day  ferrous    sulfate 325milliGRAM(s) Oral daily  pantoprazole    Tablet 40milliGRAM(s) Oral before breakfast  buDESOnide  180 MICROgram(s) Inhaler 1Puff(s) Inhalation daily  polyethylene glycol 3350 17Gram(s) Oral two times a day  zolpidem 5milliGRAM(s) Oral at bedtime PRN  gabapentin 300milliGRAM(s) Oral daily  LORazepam     Tablet 0.5milliGRAM(s) Oral every 8 hours PRN  enoxaparin Injectable 40milliGRAM(s) SubCutaneous every 12 hours  methadone 10milliGRAM(s) Oral every 8 hours  acetaminophen   Tablet. 650milliGRAM(s) Oral every 6 hours PRN  aluminum hydroxide/magnesium hydroxide/simethicone Suspension 30milliLiter(s) Oral every 4 hours PRN  ergocalciferol 85792Sszg(s) Oral every week  methylPREDNISolone sodium succinate Injectable 40milliGRAM(s) IV Push every 8 hours  levoFLOXacin  Tablet 750milliGRAM(s) Oral every 24 hours  oseltamivir 75milliGRAM(s) Oral two times a day  atorvastatin 40milliGRAM(s) Oral at bedtime            CARDIAC MARKERS ( 31 Jan 2017 12:54 )  x     / x     / 72 U/L / x     / x                                9.8    4.4   )-----------( 474      ( 01 Feb 2017 17:35 )             32.0         TPro  6.5    /  Alb  x      /  TBili  x      /  DBili  x      /  AST  x      /  ALT  x      /  AlkPhos  x      31 Jan 2017 22:55      CAPILLARY BLOOD GLUCOSE  85 (02 Feb 2017 07:51)  162 (01 Feb 2017 21:57)  90 (01 Feb 2017 17:51)  125 (01 Feb 2017 15:19) Patient is a 61y old  Female who presents with a chief complaint of shortness of breath (01 Feb 2017 13:57)      HPI:  Pt is a 61 year old female with PMH of anemia, asthma, DVT with noncompliance with AC, MS, multiple SBO's s/p multiple abdominal surgeries and recent CVA admitted to [facility] about 4 weeks ago for 4-6weeks who presented to the ED today after being sent in from Dr. Garay's office for tachycardia, low blood pressure and complaint of cough and sob.  Pt stated she has been having a cough with yellow phlegm since Last friday along with f/c/s and congestion and myalgias.  Pt stated her sister also was sick last week with similar symptoms. She denied wheezing at home or increased use of her inhalers.  Pt also stated she was admitted at [facility] about 4 weeks ago for a stroke that she had and was told she had a brain bleed but unclear if actual brain bleed as patient stated she was also taking lovenox at home.  Since her stroke about 4 weeks ago the patient has admitted to having continued lower extremity weakness and numbness in her toes up to mid shin with difficulty walking and getting out of bed.  Pt denied back pain.  She also stated she has had multiple SBO's about 6 of them and that she hasn't had a BM in about one week and has had a diffuse cramping abdominal pain for the last few days with an episode of nausea last week, but no vomiting.  She admitted to decreased PO intake at home.  In the ED patient had a CT angio to r/o PE and vital signs were Tmax: 99.0, HR: , BP 98/61, RR 18 and sO2 of 92% room air and 98% on 2L NC. (25 Jan 2017 22:05)    INTERVAL HPI/OVERNIGHT EVENTS:::confused    HEALTH ISSUES - PROBLEM Dx:  Chronic pain: Chronic pain  Asthma: Asthma  Influenza: Influenza  HCAP (healthcare-associated pneumonia): HCAP (healthcare-associated pneumonia)  Nutrition, metabolism, and development symptoms: Nutrition, metabolism, and development symptoms  Prophylactic measure: Prophylactic measure  Anemia: Anemia  Small bowel obstruction: Small bowel obstruction  MS (multiple sclerosis): MS (multiple sclerosis)  Uncomplicated asthma, unspecified asthma severity: Uncomplicated asthma, unspecified asthma severity  CVA (cerebral vascular accident): CVA (cerebral vascular accident)  History of DVT (deep vein thrombosis): History of DVT (deep vein thrombosis)  Shortness of breath: Shortness of breath          PAST MEDICAL & SURGICAL HISTORY:  CVA (cerebral vascular accident)  Anemia  G tube feedings  History of DVT (deep vein thrombosis)  MS (multiple sclerosis)  Small bowel obstruction: multiple, recurrent  Asthma  SBO (small bowel obstruction): multiple          Consultant NOTE  REVIEWED  ( ++  )    REVIEW OF SYSTEMS:  [x] As per HPI  CONSTITUTIONAL: No fever, weight loss, or fatigue  RESPIRATORY: No cough, wheezing, chills or hemoptysis; No Shortness of Breath  CARDIOVASCULAR: No chest pain, palpitations, dizziness, or leg swelling  GASTROINTESTINAL: No abdominal or epigastric pain. No nausea, vomiting, or hematemesis; No diarrhea or constipation. No melena or hematochezia.  MUSCULOSKELETAL: No joint pain or swelling; No muscle, back, or extremity pain  PSYCH    awake, alert     confused  [x] All others negative	  [ ] Unable to obtain          Vital Signs Last 24 Hrs  T(C): 36.4, Max: 36.4 (02-01 @ 21:10)  T(F): 97.5, Max: 97.6 (02-01 @ 21:10)  HR: 85 (82 - 98)  BP: 111/79 (97/69 - 111/79)  BP(mean): --  RR: 16 (16 - 18)  SpO2: 97% (97% - 99%)        PHYSICAL EXAMINATION:                                    (    )  NO CHANGE  Appearance: Normal	  HEENT:   Normal oral mucosa, PERRL, EOMI	  Neck: Supple, + JVD/ - JVD; Carotid Bruit   Cardiovascular: Normal S1 S2, No JVD, No murmurs,   Respiratory: Lungs clear to auscultation/Decreased Breath Sounds/No Rales, Rhonchi, Wheezing	  Gastrointestinal:  Soft, Non-tender, + BS	  Skin: No rashes, No ecchymoses, No cyanosis  Extremities: Normal range of motion, No clubbing, cyanosis or edema  Vascular: Peripheral pulses palpable 2+ bilaterally  Neurologic: Non-focal  Psychiatry: Awake    insulin lispro (HumaLOG) corrective regimen sliding scale  SubCutaneous Before meals and at bedtime  ALBUTerol/ipratropium for Nebulization 3milliLiter(s) Nebulizer every 4 hours  levETIRAcetam 1000milliGRAM(s) Oral two times a day  ferrous    sulfate 325milliGRAM(s) Oral daily  pantoprazole    Tablet 40milliGRAM(s) Oral before breakfast  buDESOnide  180 MICROgram(s) Inhaler 1Puff(s) Inhalation daily  polyethylene glycol 3350 17Gram(s) Oral two times a day  zolpidem 5milliGRAM(s) Oral at bedtime PRN  gabapentin 300milliGRAM(s) Oral daily  LORazepam     Tablet 0.5milliGRAM(s) Oral every 8 hours PRN  enoxaparin Injectable 40milliGRAM(s) SubCutaneous every 12 hours  methadone 10milliGRAM(s) Oral every 8 hours  acetaminophen   Tablet. 650milliGRAM(s) Oral every 6 hours PRN  aluminum hydroxide/magnesium hydroxide/simethicone Suspension 30milliLiter(s) Oral every 4 hours PRN  ergocalciferol 05643Trop(s) Oral every week  methylPREDNISolone sodium succinate Injectable 40milliGRAM(s) IV Push every 8 hours  levoFLOXacin  Tablet 750milliGRAM(s) Oral every 24 hours  oseltamivir 75milliGRAM(s) Oral two times a day  atorvastatin 40milliGRAM(s) Oral at bedtime            CARDIAC MARKERS ( 31 Jan 2017 12:54 )  x     / x     / 72 U/L / x     / x                                9.8    4.4   )-----------( 474      ( 01 Feb 2017 17:35 )             32.0         TPro  6.5    /  Alb  x      /  TBili  x      /  DBili  x      /  AST  x      /  ALT  x      /  AlkPhos  x      31 Jan 2017 22:55    ORY: PNA    PRIOR STUDIES: 1/25/2017.    FINDINGS: The lungs appear clear. The prior left retrocardiac opacities   appear to have resolved. There are no pleural effusions.  The   cardiomediastinal silhouette is  unremarkable. Wedging of 2 lower   thoracic vertebral bodies, unchanged.    IMPRESSION:  The lungs appear clear.    CAPILLARY BLOOD GLUCOSE  85 (02 Feb 2017 07:51)  162 (01 Feb 2017 21:57)  90 (01 Feb 2017 17:51)  125 (01 Feb 2017 15:19)

## 2017-02-02 NOTE — PROGRESS NOTE ADULT - PROBLEM SELECTOR PLAN 6
Stable, pt does not appear to be in MS flare.   - f/u Neurology consult; recs appreciated
H/o multiple SBOs in the past and now with mild diffuse abdominal pain, no rigidity, no rebound. Pt suffers from chronic abdominal pain and on methadone. No BM for one week. Abd XR unremarkable for any obstruction or perforation.   - Starting a bowel regimen

## 2017-02-02 NOTE — PROGRESS NOTE ADULT - SUBJECTIVE AND OBJECTIVE BOX
CC/ HPI 60 y/o female admitted with respiratory insufficiency, AH3 influenza, LLL pneumonia , multiple sclerosis, asthma, c/o dyspnea and wheezing.    PAST MEDICAL & SURGICAL HISTORY:  CVA (cerebral vascular accident)  Anemia  G tube feedings  History of DVT (deep vein thrombosis)  MS (multiple sclerosis)  Small bowel obstruction: multiple, recurrent  Asthma  SBO (small bowel obstruction): multiple    SOCHX:  + tobacco    FMHX: FA/MO  - contributory     ROS reviewed below with positive findings marked (+) :  GEN:  fever, chills ENT: tracheostomy,   epistaxis,  sinusitis COR: CAD, CHF,  HTN, dysrhythmia PUL: COPD, ILD, asthma, pneumonia GI: +PEG, dysphagia, hemorrhage, other BRENDA: kidney disease, electrolyte disorder HEM:  +anemia, +thrombus, coagulopathy, cancer ENDO:  thyroid disease, diabetes mellitus CNS:  dementia, +stroke, seizure, PSY:  depression, anxiety, other    MEDICATIONS  (STANDING):  insulin lispro (HumaLOG) corrective regimen sliding scale  SubCutaneous Before meals and at bedtime  ALBUTerol/ipratropium for Nebulization 3milliLiter(s) Nebulizer every 4 hours  levETIRAcetam 1000milliGRAM(s) Oral two times a day  ferrous    sulfate 325milliGRAM(s) Oral daily  pantoprazole    Tablet 40milliGRAM(s) Oral before breakfast  buDESOnide  180 MICROgram(s) Inhaler 1Puff(s) Inhalation daily  polyethylene glycol 3350 17Gram(s) Oral two times a day  gabapentin 300milliGRAM(s) Oral daily  enoxaparin Injectable 40milliGRAM(s) SubCutaneous every 12 hours  methadone 10milliGRAM(s) Oral every 8 hours  ergocalciferol 19601Kcji(s) Oral every week  methylPREDNISolone sodium succinate Injectable 40milliGRAM(s) IV Push every 8 hours  levoFLOXacin  Tablet 750milliGRAM(s) Oral every 24 hours  oseltamivir 75milliGRAM(s) Oral two times a day  atorvastatin 40milliGRAM(s) Oral at bedtime    MEDICATIONS  (PRN):  zolpidem 5milliGRAM(s) Oral at bedtime PRN Insomnia  LORazepam     Tablet 0.5milliGRAM(s) Oral every 8 hours PRN Anxiety  acetaminophen   Tablet. 650milliGRAM(s) Oral every 6 hours PRN Moderate Pain (4 - 6)  aluminum hydroxide/magnesium hydroxide/simethicone Suspension 30milliLiter(s) Oral every 4 hours PRN Dyspepsia      Vital Signs Last 24 Hrs  T(C): 36.3, Max: 36.4 (02-01 @ 21:10)  T(F): 97.4, Max: 97.6 (02-01 @ 21:10)  HR: 98 (82 - 98)  BP: 97/69 (97/69 - 110/67)  BP(mean): --  RR: 18 (16 - 18)  SpO2: 98% (98% - 99%)    GENERAL:         comfortable,  - distress.  HEENT:            - trauma,  - icterus,  - injection,  - nasal discharge.  NECK:              - jugular venous distention, - thyromegaly.  LYMPH:           - lymphadenopathy, - masses.  RESP:               - clear,  + rales,   - rhonchi,   + wheezes.   COR:                S1S2 RRR  - murmurs,  - gallops,  - rubs.  ABD:                bowel sounds,   soft, - tender, - distended, - organomegaly.  EXT/MSC:         - cyanosis,  + clubbing,  - edema.    NEURO:             alert,   responds to stimuli.                        9.8    4.4   )-----------( 474      ( 01 Feb 2017 17:35 )             32.0     CT chest (1/25)   1. No evidence of pulmonary artery emboli. No evidence of right   ventricular strain.  2. Findings compatible with small large airway inflammation with   bronchial wall thickening and scattered tree-in-bud micronodular pattern.   There is a mucous plugging are noted within the left lower lobe.  3. Focal consolidation/atelectasis involving the left lower lobe with a   small possible left parapneumonic effusion.    Spirometry (02/02)  Very severe obstructive pulmonary dysfunction    ASSESSMENT/PLAN    1)  Pneumonia  2)  Asthma  3)  Influenza  4)  DVT      Repeat chest radiograph  Bronchodilators:  Atrovent/ albuterol q 4 hours  Corticosteroids:  medrol/budesonide  ID/Antibiotics:  Levaquin  Cardiac/HTN:  BP stable  GI: Rx/ prophylaxis c PPI/H2B  Heme: Rx/VT prophylaxis c AC  Discussed with medical team

## 2017-02-02 NOTE — CHART NOTE - NSCHARTNOTEFT_GEN_A_CORE
PGY-1 Event Note    Paged to the bedside multiple times for patient complaining of pain. Patient unable to explain where her pain is, or what it feels like. Patient on methadone 10mg q8 hours for her chronic pain. Demanding that she be given Dilaudid. Does not remember being given Dilaudid earlier in the evening by the call team, though she was given 4mg. Do not feel comfortable giving the patient additional opiates considering she was confused earlier in the evening. Patient asked if she would like Tylenol, Ibuprofen, or Tramadol for her pain, however, she was unwilling to try anything other than Dilaudid. Explained to patient that she will not be given any additional Dilaudid. Patient now refusing labs, vitals, and her methadone. Demanding to leave AMA, patient AOx3 and has capacity. Dr Garay informed, Levaquin and Tamiflu sent to pharmacy. AMA form signed by patient after explaining to her the risks of leaving, and giving her contact info for her consultants as well as Dr Gaary. Patient understands that she must call and make an appointment with Dr Garay tomorrow.

## 2017-02-02 NOTE — PROGRESS NOTE ADULT - SUBJECTIVE AND OBJECTIVE BOX
I had seen Ms Banuelos in a follow up today.    MEDICATIONS  (STANDING):  insulin lispro (HumaLOG) corrective regimen sliding scale  SubCutaneous Before meals and at bedtime  ALBUTerol/ipratropium for Nebulization 3milliLiter(s) Nebulizer every 4 hours  levETIRAcetam 1000milliGRAM(s) Oral two times a day  ferrous    sulfate 325milliGRAM(s) Oral daily  pantoprazole    Tablet 40milliGRAM(s) Oral before breakfast  buDESOnide  180 MICROgram(s) Inhaler 1Puff(s) Inhalation daily  polyethylene glycol 3350 17Gram(s) Oral two times a day  gabapentin 300milliGRAM(s) Oral daily  enoxaparin Injectable 40milliGRAM(s) SubCutaneous every 12 hours  methadone 10milliGRAM(s) Oral every 8 hours  ergocalciferol 04342Ofgf(s) Oral every week  levoFLOXacin  Tablet 750milliGRAM(s) Oral every 24 hours  oseltamivir 75milliGRAM(s) Oral two times a day  atorvastatin 40milliGRAM(s) Oral at bedtime  predniSONE   Tablet 50milliGRAM(s) Oral every 12 hours    MEDICATIONS  (PRN):  zolpidem 5milliGRAM(s) Oral at bedtime PRN Insomnia  LORazepam     Tablet 0.5milliGRAM(s) Oral every 8 hours PRN Anxiety  acetaminophen   Tablet. 650milliGRAM(s) Oral every 6 hours PRN Moderate Pain (4 - 6)  aluminum hydroxide/magnesium hydroxide/simethicone Suspension 30milliLiter(s) Oral every 4 hours PRN Dyspepsia  HYDROcodone/homatropine Syrup 5milliLiter(s) Oral every 6 hours PRN Cough    Vital Signs Last 24 Hrs  T(C): 37.2, Max: 37.2 (02-02 @ 16:25)  T(F): 99, Max: 99 (02-02 @ 16:25)  HR: 95 (82 - 98)  BP: 102/68 (97/69 - 111/79)  BP(mean): --  RR: 16 (16 - 18)  SpO2: 100% (97% - 100%)                          9.8    4.4   )-----------( 474      ( 01 Feb 2017 17:35 )             32.0         TPro  6.5    /  Alb  3.5    /  TBili  x      /  DBili  x      /  AST  x      /  ALT  x      /  AlkPhos  x      31 Jan 2017 22:55    Immunology labs were all NL/negative: ARNALDO, ANCAs, RF, IPEP, SPEP, IF, T&B cell enumeration, ESR, CRP.    Impression:  There is no evidence of immunodeficiency, inflammation, or systemic connective tissue disorder, which might explain the patient's condition.      Plan:  No additional treatment is recommended.  Follow up with Heme, Pulmonary and ID.

## 2017-02-02 NOTE — PROGRESS NOTE ADULT - PROBLEM SELECTOR PROBLEM 4
CVA (cerebral vascular accident)
Chronic pain
Uncomplicated asthma, unspecified asthma severity

## 2017-02-02 NOTE — PROGRESS NOTE ADULT - SUBJECTIVE AND OBJECTIVE BOX
INTERVAL HPI: eventsn oted for confusion, today no cp sob palp  	  MEDICATIONS:    levoFLOXacin  Tablet 750milliGRAM(s) Oral every 24 hours  oseltamivir 75milliGRAM(s) Oral two times a day    ALBUTerol/ipratropium for Nebulization 3milliLiter(s) Nebulizer every 4 hours  buDESOnide  180 MICROgram(s) Inhaler 1Puff(s) Inhalation daily    levETIRAcetam 1000milliGRAM(s) Oral two times a day  zolpidem 5milliGRAM(s) Oral at bedtime PRN  gabapentin 300milliGRAM(s) Oral daily  LORazepam     Tablet 0.5milliGRAM(s) Oral every 8 hours PRN  methadone 10milliGRAM(s) Oral every 8 hours  acetaminophen   Tablet. 650milliGRAM(s) Oral every 6 hours PRN    pantoprazole    Tablet 40milliGRAM(s) Oral before breakfast  polyethylene glycol 3350 17Gram(s) Oral two times a day  aluminum hydroxide/magnesium hydroxide/simethicone Suspension 30milliLiter(s) Oral every 4 hours PRN    insulin lispro (HumaLOG) corrective regimen sliding scale  SubCutaneous Before meals and at bedtime  methylPREDNISolone sodium succinate Injectable 40milliGRAM(s) IV Push every 8 hours  atorvastatin 40milliGRAM(s) Oral at bedtime    ferrous    sulfate 325milliGRAM(s) Oral daily  enoxaparin Injectable 40milliGRAM(s) SubCutaneous every 12 hours  ergocalciferol 42679Pqmk(s) Oral every week      REVIEW OF SYSTEMS:  [x] As per HPI  CONSTITUTIONAL: No fever, weight loss, or fatigue  RESPIRATORY: No cough, wheezing, chills or hemoptysis; No Shortness of Breath  CARDIOVASCULAR: No chest pain, palpitations, dizziness, or leg swelling  GASTROINTESTINAL: No abdominal or epigastric pain. No nausea, vomiting, or hematemesis; No diarrhea or constipation. No melena or hematochezia.  MUSCULOSKELETAL: No joint pain or swelling; No muscle, back, or extremity pain  [x] All others negative	  [ ] Unable to obtain    PHYSICAL EXAM:  T(C): 36.4, Max: 36.4 (02-01 @ 21:10)  HR: 85 (82 - 98)  BP: 111/79 (97/69 - 111/79)  RR: 16 (16 - 18)  SpO2: 97% (97% - 99%)  Wt(kg): --  I&O's Summary        Appearance: Normal	  HEENT:   Normal oral mucosa  Cardiovascular: Normal S1 S2, No JVD, No murmurs, No edema  Respiratory: Lungs clear to auscultation	  Gastrointestinal:  Soft, Non-tender, + BS	  Extremities: Normal range of motion, No clubbing, cyanosis or edema  Vascular: Peripheral pulses palpable 2+ bilaterally    TELEMETRY: 	    ECG:    	  RADIOLOGY:   CXR:  CT:  US:    CARDIAC TESTING:  Echocardiogram:  Catheterization:  Stress Test:      LABS:	 	    CARDIAC MARKERS:                                  9.8    4.4   )-----------( 474      ( 01 Feb 2017 17:35 )             32.0         TPro  6.5    /  Alb  x      /  TBili  x      /  DBili  x      /  AST  x      /  ALT  x      /  AlkPhos  x      31 Jan 2017 22:55    proBNP:   Lipid Profile:   HgA1c:   TSH:     ASSESSMENT/PLAN: 	   AO mbile mass - ADRYAN clarification of mass as focal calcification, no thrombus, not mobile. would recommend high dose statin therapy at this time  HTN - at goal, observe  SOB - mucous plugging no PE  Hx DVT - dopplers neg  replete electrolytes

## 2017-02-02 NOTE — PROGRESS NOTE ADULT - PROBLEM SELECTOR PROBLEM 5
Uncomplicated asthma, unspecified asthma severity
MS (multiple sclerosis)

## 2017-02-02 NOTE — PROGRESS NOTE ADULT - PROBLEM SELECTOR PROBLEM 3
CVA (cerebral vascular accident)
Influenza
CVA (cerebral vascular accident)
Influenza
Shortness of breath

## 2017-02-02 NOTE — PROGRESS NOTE ADULT - PROBLEM SELECTOR PROBLEM 9
Nutrition, metabolism, and development symptoms
Prophylactic measure

## 2017-02-02 NOTE — PROGRESS NOTE ADULT - PROBLEM SELECTOR PROBLEM 7
Small bowel obstruction
Anemia

## 2017-02-02 NOTE — PROGRESS NOTE ADULT - I WAS PHYSICALLY PRESENT FOR THE KEY PORTIONS OF THE EVALUATION AND MANAGEMENT (E/M) SERVICE PROVIDED.  I AGREE WITH THE ABOVE HISTORY, PHYSICAL, AND PLAN WHICH I HAVE REVIEWED AND EDITED WHERE APPROPRIATE
Statement Selected

## 2017-02-02 NOTE — PROGRESS NOTE ADULT - PROBLEM SELECTOR PLAN 8
Normocytic anemia, possibly AOCD or iron deficiency, unclear baseline, however does not appear to be acutely bleeding, no GI symptoms, less likely hemolyzing due to normal T. bili. Iron panel remarkable for iron deficiency.  - Started Iron supplementation  - Maintain active T&S, Transfuse if Hgb < 7
Normocytic anemia, possibly AOCD or iron deficiency, unclear baseline, however does not appear to be acutely bleeding, no GI symptoms, less likely hemolyzing due to normal T. bili. Iron panel remarkable for iron deficiency.  - Started Iron supplementation  - Maintain active T&S, Transfuse if Hgb < 7
Normocytic anemia, possibly AOCD or iron deficiency, unclear baseline, however does not appear to be acutely bleeding, no GI symptoms, less likely hemolyzing due to normal T. bili. Iron panel remarkable for iron deficiency.  - c/w Iron supplementation  - Maintain active T&S, Transfuse if Hgb < 7  - f/u Heme consult; recs appreciated
F: None  E: Replete electrolytes PRN  N: c/w DASH/TLC diet

## 2017-02-02 NOTE — PROGRESS NOTE ADULT - SUBJECTIVE AND OBJECTIVE BOX
· Subjective and Objective: 	  HPI:  Pt is a 61 year old female with PMH of anemia, asthma, DVT with noncompliance with AC, MS, multiple SBO's s/p multiple abdominal surgeries and recent CVA admitted to [facility] about 4 weeks ago for 4-6weeks who presented to the ED today after being sent in from Dr. Garay's office for tachycardia, low blood pressure and complaint of cough and sob.  Pt stated she has been having a cough with yellow phlegm since Last friday along with f/c/s and congestion and myalgias.  Pt stated her sister also was sick last week with similar symptoms. She denied wheezing at home or increased use of her inhalers.  Pt also stated she was admitted at [facility] about 4 weeks ago for a stroke that she had and was told she had a brain bleed but unclear if actual brain bleed as patient stated she was also taking lovenox at home.  Since her stroke about 4 weeks ago the patient has admitted to having continued lower extremity weakness and numbness in her toes up to mid shin with difficulty walking and getting out of bed.  Pt denied back pain.  She also stated she has had multiple SBO's about 6 of them and that she hasn't had a BM in about one week and has had a diffuse cramping abdominal pain for the last few days with an episode of nausea last week, but no vomiting.  She admitted to decreased PO intake at home.  In the ED patient had a CT angio to r/o PE and vital signs were Tmax: 99.0, HR: , BP 98/61, RR 18 and sO2 of 92% room air and 98% on 2L NC. (25 Jan 2017 22:05).  No s/s of bleeding noted.    Dxed with influenza and pneumonia  Subjectively better now  States admitted to [facility] recently with infection  No s/s of bleeding noted,    ROS otherwise neg    PAST MEDICAL & SURGICAL HISTORY:  CVA (cerebral vascular accident)  Anemia  G tube feedings  History of DVT (deep vein thrombosis)  MS (multiple sclerosis)  Small bowel obstruction: multiple, recurrent  Asthma  SBO (small bowel obstruction): multiple      MEDICATIONS  (STANDING):  piperacillin/tazobactam IVPB.  IV Intermittent   piperacillin/tazobactam IVPB. 4.5Gram(s) IV Intermittent every 6 hours  insulin lispro (HumaLOG) corrective regimen sliding scale  SubCutaneous Before meals and at bedtime  ALBUTerol/ipratropium for Nebulization 3milliLiter(s) Nebulizer every 4 hours  cholecalciferol 1000Unit(s) Oral daily  levETIRAcetam 1000milliGRAM(s) Oral two times a day  ferrous    sulfate 325milliGRAM(s) Oral daily  oseltamivir 75milliGRAM(s) Oral two times a day  pantoprazole    Tablet 40milliGRAM(s) Oral before breakfast  buDESOnide  180 MICROgram(s) Inhaler 1Puff(s) Inhalation daily  heparin  Injectable 5000Unit(s) SubCutaneous every 8 hours  methylPREDNISolone sodium succinate Injectable 40milliGRAM(s) IV Push every 8 hours  vancomycin  IVPB 1250milliGRAM(s) IV Intermittent every 12 hours  polyethylene glycol 3350 17Gram(s) Oral two times a day    MEDICATIONS  (PRN):  methadone 10milliGRAM(s) Oral every 6 hours PRN pain      Allergies    dust (Unknown)  latex (Unknown)  No Known Drug Allergies  Nuts (Anaphylaxis)  peanuts (Unknown)    SOCIAL HISTORY:  Lives at home  On Methadone    FAMILY HISTORY:  No pertinent family history in first degree relatives    EXAM  Vital Signs Last 24 Hrs  Vital Signs Last 24 Hrs  T(C): 37.2, Max: 37.2 (02-02 @ 16:25)  T(F): 99, Max: 99 (02-02 @ 16:25)  HR: 95 (82 - 98)  BP: 102/68 (97/69 - 111/79)  BP(mean): --  RR: 16 (16 - 18)  SpO2: 100% (97% - 100%)    Thin and frail  Awake and alert but slow speech and troubles with recall  Follows commands  Oral mucosa without thrush  RRR  Chest with poor insp effort  Abd soft ND NT  LE no edema    LABS:                          9.8    4.4   )-----------( 474      ( 01 Feb 2017 17:35 )             32.0           TPro  6.5    /  Alb  3.5    /  TBili  x      /  DBili  x      /  AST  x      /  ALT  x      /  AlkPhos  x      31 Jan 2017 22:55    RVP positive for Influenza A3    EXAM:  CT ANGIO CHEST PE PROTOCOL IC                           PROCEDURE DATE:  01/25/2017    Quantity of Contrast in Vial in ml: 120 Contrast Used: Optiray 350  Quantity of Contrast Wasted in ml: 6       INTERPRETATION:  CT Pulmonary Angiography (CTPA) of the CHEST dated   1/25/2017 6:40 PM    INDICATION: r/o PE     TECHNIQUE: CT angiography of the pulmonary arteries was performed during   rapid bolus injection of intravenous contrast.  Post-processing including   the production of axial and coronal multiplanar reformatted images and   coronal and axial maximum intensity projections (MIPs) was performed.    PRIOR STUDY: None. In addition there is bilateral mosaic perfusion   pattern to suggest air-trapping from underlying small airway   inflammation. No consolidation or pleural collections. Linear atelectasis   and parenchymal scarring with distortion of the left major fissure focal   interrogation of the left hemidiaphragm is identified. There is a small   loculated left-sided pleuraleffusion. There is immediate adjacent   compressive atelectasis/early pneumonitis (sagittal image 38, series 8   and axial image 199, series 4).    FINDINGS:    LUNGS: There is evidence of small and large airway inflammation   characterized by bronchial wall thickening as well as scattered   tree-in-bud micronodular opacities. No pulmonary abnormalities are   evident.      MEDIASTINUM: Pulmonary arteries are of normal caliber. No  main, lobar or   segmental branch acute or chronic pulmonary embolism. No evidence of   right ventricular strain. The heart is normal in size.  No pericardial   effusion is seen. The thoracic aorta is normal in appearance. No   mediastinal, hilar or axillary lymphadenopathy is seen.    UPPER ABDOMEN, SOFT TISSUES AND BONES: Limited evaluation of the upper   abdomen demonstrates redemonstrated is an oval appearing cystic structure   demonstrating simple fluid attenuation within the subcapsular region of   segment 4A which appears slightly larger in size when compared to prior   study measuring 3.5 cm in AP dimension and 3.2 cm and prior examination   dated 2/10/2016. May be dilated tubular structure immediately anteriorly   possibly representing intrahepatic bile ductal dilatation. Surgical clips   are noted in the region of the stomach to suggest gastric sleeve and   possibly gastrojejunostomy. Osseous structures demonstrate multiple   compression fracture deformities involving L1 T8 and T9.     IMPRESSION:    1. No evidence of pulmonary artery emboli. No evidence of right   ventricular strain.  2. Findings compatible with small large airway inflammation with   bronchial wall thickening and scattered tree-in-bud micronodular pattern.   There is a mucous plugging are noted within the left lower lobe.  3. Focal consolidation/atelectasis involving the left lower lobe with a   small possible left parapneumonic effusion.  4. Linear areas of scarring and/or atelectasis in the left lower lung   zone.  5. Multiple low-attenuation intrahepatic structures possibly representing   cysts the largest of which has increased in size when compared to   2/10/2016 measuring up to 3.5 cm. There may be new and hepatic bile duct   dilatation. Further characterization of these findings with a nonemergent   hepatic ultrasound is suggested.        Assessment and Recommendation:   · Assessment		  Leukopenia/Neutropenia/Anemia  Acute Influenza A virus infection  Consolidative/lobar pneumonia  Chronic airway disease    RECOMMEND  Continue Antimicrobials as per ID  Await bone marrow biopsy results  DVT ppx - sq Lovenox

## 2017-02-02 NOTE — PROGRESS NOTE ADULT - PROBLEM SELECTOR PLAN 7
H/o multiple SBOs in the past and now with mild diffuse abdominal pain, no rigidity, no rebound. Pt suffers from chronic abdominal pain and on methadone. No BM for one week. Abd XR unremarkable for any obstruction or perforation.   - Starting a bowel regimen
H/o multiple SBOs in the past and now with mild diffuse abdominal pain, no rigidity, no rebound. Pt suffers from chronic abdominal pain and on methadone. No BM for one week. Abd XR unremarkable for any obstruction or perforation.   - c/w bowel regimen
Normocytic anemia, possibly AOCD or iron deficiency, unclear baseline, however does not appear to be acutely bleeding, no GI symptoms, less likely hemolyzing due to normal T. bili.  - f/u Iron panel, monitor cbc  - Maintain active T&S, Transfuse if Hgb < 7

## 2017-02-02 NOTE — PROGRESS NOTE ADULT - PROBLEM SELECTOR PLAN 3
Resolved - RVP +ve for Influenza. s/p Tamiflu (day 5/5)
Per patient she had a brain bleed 4 weeks ago and was admitted at [facility] for 4-6 weeks. Lower extremity weakness and numbness is stable since admission for stroke per patient. CT head unremarkable for any acute pathology or chronic infarcts.   - f/u Neurology consult; recs appreciated  - c/w home keppra 1000mg bid for seizure prophylaxis
Per patient she had a brain bleed 4 weeks ago and was admitted at [facility] for 4-6 weeks. Lower extremity weakness and numbness is stable since admission for stroke per patient. CT head unremarkable for any acute pathology or chronic infarcts.   - f/u Neurology consult; recs appreciated  - c/w home keppra 1000mg bid for seizure prophylaxis
Per patient she had a brain bleed 4 weeks ago and was admitted at [facility] for 4-6 weeks. Lower extremity weakness and numbness is stable since admission for stroke per patient. CT head unremarkable for any acute pathology or chronic infarcts.   - f/u Neurology consult; recs appreciated  - c/w home keppra 1000mg bid for seizure prophylaxis
Per patient she had a brain bleed 4 weeks ago and was admitted at [facility] for 4-6 weeks. Lower extremity weakness and numbness is stable since admission for stroke per patient. CT head unremarkable for any acute pathology or chronic infarcts.   - f/u Neurology consult; recs appreciated  - c/w home keppra 1000mg bid for seizure prophylaxis
Per patient she had a brain bleed 4 weeks ago and was admitted at [facility] for 4-6 weeks. Lower extremity weakness and numbness is stable since admission for stroke per patient. CT head unremarkable for any acute pathology or chronic infarcts.   - f/u Neurology consult; recs appreciated  - c/w home keppra 1000mg bid for seizure prophylaxis
RVP +ve for Influenza  - c/w Isolation  - c/w Tamiflu (day 2/5)
RVP +ve for Influenza  - c/w Isolation  - c/w Tamiflu (day 2/5)
Resolved - RVP +ve for Influenza. s/p Tamiflu (day 5/5)
per pulmonary
supportive
per patient she had a brain bleed 4 weeks ago and was admitted at [facility] for 4-6 weeks. Lower extremity weakness and numbness is stable since admission for stroke per patient.  - will need to obtain collateral information  - f/u CT head for possible bleed. no HSQ or AC until confirmed no brain bleed present.  - f/u Neurology consult; recs appreciated  - c/w home keppra 1000mg bid for seizure prophylaxis

## 2017-02-02 NOTE — PROGRESS NOTE ADULT - PROBLEM SELECTOR PLAN 1
GI workup when clinically better (elective)
Stable - on 2L NC - Multifactorial - Associated with cough, found to have tachycardic and low BP in ED, concerning for PNA on underlying influenza (RVP +ve for Inlfuenza). CTPE performed given hx of DVT, however, negative for PE and DUS of LE unremarkable for DVTs. CT-chest remarkable for mucous plugging, and a left sided infiltrate with possible parapneumonic effusion present. Pt also had an episode of wheezing yesterday, w/ hx of asthma, requiring 40mg Solumedrol and DuoNeb.  - c/w Vancomycin and Zosyn for likely HCAP (Day 3)  - c/w Duonebs [id number]  - Started Pulmicort yesterday  - c/w Prednisone   - c/w 2L NC for supplemental O2 PRN  - f/u Pulm consult; recs appreciated  - f/u Echocardiogram
Stable - on 2L NC - Multifactorial - Associated with cough, found to have tachycardic and low BP in ED, concerning for PNA on underlying influenza (RVP +ve for Inlfuenza). CTPE performed given hx of DVT, however, negative for PE and DUS of LE unremarkable for DVTs. CT-chest remarkable for mucous plugging, and a left sided infiltrate with possible parapneumonic effusion present. Pt also had an episode of wheezing yesterday, w/ hx of asthma, requiring 40mg Solumedrol and DuoNeb.  - c/w Vancomycin and Zosyn for likely HCAP (Day 3)  - c/w Duonebs [id number]  - Started Pulmicort yesterday  - c/w Prednisone   - c/w 2L NC for supplemental O2 PRN  - f/u Pulm consult; recs appreciated  - f/u Echocardiogram
Stable - on 2L NC - Multifactorial - Associated with cough, found to have tachycardic and low BP in ED, concerning for PNA on underlying influenza (RVP +ve for Inlfuenza). CTPE performed given hx of DVT, however, negative for PE and DUS of LE unremarkable for DVTs. CT-chest remarkable for mucous plugging, and a left sided infiltrate with possible parapneumonic effusion present. Pt also had an episode of wheezing, w/ hx of asthma, requiring 40mg Solumedrol and DuoNeb. Pt w/ nomral LV function w/ EF of 55-60%. Nomral RV w/ dilated RA. No pulm HTN, however, remarkable for aortic valvular abnormality, concerning for vegetation.  - f/u ADRYAN  - f/u BM Biopsy tomorrow  - c/w Vancomycin and Zosyn for likely HCAP (Day 7) - f/u Vanc trough tonight  - c/w Duonebs [id number]  - c/w Pulmicort daily  - c/w Solumedrol 40mg IV q8h  - c/w 2L NC for supplemental O2 PRN  - f/u Pulm consult (Dr. Heredia); recs appreciated  - f/u ID consult (Dr. Correa); recs appreciated
Stable - on 2L NC - Multifactorial - Associated with cough, found to have tachycardic and low BP in ED, concerning for PNA on underlying influenza (RVP +ve for Inlfuenza). CTPE performed given hx of DVT, however, negative for PE and DUS of LE unremarkable for DVTs. CT-chest remarkable for mucous plugging, and a left sided infiltrate with possible parapneumonic effusion present. Pt also had an episode of wheezing, w/ hx of asthma, requiring 40mg Solumedrol and DuoNeb. Pt w/ nomral LV function w/ EF of 55-60%. Nomral RV w/ dilated RA. No pulm HTN, however, remarkable for aortic valvular abnormality, concerning for vegetation.  - f/u ADRYAN  - f/u BM Biopsy tomorrow  - c/w Vancomycin and Zosyn for likely HCAP (Day 7) - f/u Vanc trough tonight  - c/w Duonebs [id number]  - c/w Pulmicort daily  - c/w Solumedrol 40mg IV q8h  - c/w 2L NC for supplemental O2 PRN  - f/u Pulm consult (Dr. Heredia); recs appreciated  - f/u ID consult (Dr. Correa); recs appreciated
Stable - on 2L NC - Multifactorial - Associated with cough, found to have tachycardic and low BP in ED, concerning for PNA on underlying influenza (RVP +ve for Inlfuenza). CTPE performed given hx of DVT, however, negative for PE and DUS of LE unremarkable for DVTs. CT-chest remarkable for mucous plugging, and a left sided infiltrate with possible parapneumonic effusion present. Pt also had an episode of wheezing, w/ hx of asthma, requiring 40mg Solumedrol and DuoNeb. Pt w/ nomral LV function w/ EF of 55-60%. Nomral RV w/ dilated RA. No pulm HTN, however, remarkable for aortic valvular abnormality, concerning for vegetation.  - f/u ADRYAN  - f/u BM Biopsy tomorrow  - c/w Vancomycin and Zosyn for likely HCAP (Day 7) - f/u Vanc trough tonight  - c/w Duonebs [id number]  - c/w Pulmicort daily  - c/w Solumedrol 40mg IV q8h  - c/w 2L NC for supplemental O2 PRN  - f/u Pulm consult (Dr. Heredia); recs appreciated  - f/u ID consult (Dr. Correa); recs appreciated
Stable - on 2L NC - Multifactorial - Associated with cough, found to have tachycardic and low BP in ED, concerning for PNA on underlying influenza (RVP +ve for Inlfuenza). CTPE performed given hx of DVT, however, negative for PE and DUS of LE unremarkable for DVTs. CT-chest remarkable for mucous plugging, and a left sided infiltrate with possible parapneumonic effusion present. Pt also had an episode of wheezing, w/ hx of asthma, requiring 40mg Solumedrol and DuoNeb. Pt w/ nomral LV function w/ EF of 55-60%. Nomral RV w/ dilated RA. No pulm HTN, however, remarkable for aortic valvular abnormality, concerning for vegetation. ADRYAN unremarkable for any vegetation.   - f/u BM Biopsy results  - c/w Vancomycin and Zosyn for likely HCAP (Day 8)  - c/w Duonebs [id number]  - c/w Pulmicort daily  - c/w Solumedrol 40mg IV q8h  - c/w 2L NC for supplemental O2 PRN  - f/u Pulm consult (Dr. Heredia); recs appreciated  - f/u ID consult (Dr. Correa); recs appreciated
Stable - on 2L NC - Multifactorial - Associated with cough, found to have tachycardic and low BP in ED, concerning for PNA on underlying influenza (RVP +ve for Inlfuenza). CTPE performed given hx of DVT, however, negative for PE and DUS of LE unremarkable for DVTs. CT-chest remarkable for mucous plugging, and a left sided infiltrate with possible parapneumonic effusion present. Pt also had an episode of wheezing, w/ hx of asthma, requiring 40mg Solumedrol and DuoNeb. Pt w/ nomral LV function w/ EF of 55-60%. Nomral RV w/ dilated RA. No pulm HTN.   - c/w Vancomycin and Zosyn for likely HCAP (Day 5) - vanc held in the setting of elevated vanc trough (42) - f/u Vanc level today  - c/w Duonebs [id number]  - c/w Pulmicort daily  - c/w Solumedrol 40mg IV q8h - s/p 100mg addnl Solumedrol yesterday  - c/w 2L NC for supplemental O2 PRN  - f/u Pulm consult (Dr. Heredia); recs appreciated  - f/u ID consult (Dr. Correa); recs appreciated
Stable - on 2L NC - Multifactorial - Associated with cough, found to have tachycardic and low BP in ED, concerning for PNA on underlying influenza (RVP +ve for Inlfuenza). CTPE performed given hx of DVT, however, negative for PE and DUS of LE unremarkable for DVTs. CT-chest remarkable for mucous plugging, and a left sided infiltrate with possible parapneumonic effusion present. Pt also had an episode of wheezing, w/ hx of asthma, requiring 40mg Solumedrol and DuoNeb. Pt w/ nomral LV function w/ EF of 55-60%. Nomral RV w/ dilated RA. No pulm HTN.   - c/w Vancomycin and Zosyn for likely HCAP (Day 5) - vanc held in the setting of elevated vanc trough (42) - f/u Vanc level today  - c/w Duonebs [id number]  - c/w Pulmicort daily  - c/w Solumedrol 40mg IV q8h - s/p 100mg addnl Solumedrol yesterday  - c/w 2L NC for supplemental O2 PRN  - f/u Pulm consult (Dr. Heredia); recs appreciated  - f/u ID consult (Dr. Correa); recs appreciated
Stable - on 2L NC - Multifactorial - Associated with cough, found to have tachycardic and low BP in ED, concerning for PNA on underlying influenza (RVP +ve for Inlfuenza). CTPE performed given hx of DVT, however, negative for PE and DUS of LE unremarkable for DVTs. CT-chest remarkable for mucous plugging, and a left sided infiltrate with possible parapneumonic effusion present. Pt also had an episode of wheezing, w/ hx of asthma, requiring 40mg Solumedrol and DuoNeb. Pt w/ nomral LV function w/ EF of 55-60%. Nomral RV w/ dilated RA. No pulm HTN.  - c/w Vancomycin and Zosyn for likely HCAP (Day 5) - vanc held in the setting of elevated vanc trough (42)  - c/w Duonebs [id number]  - c/w Pulmicort daily  - c/w Solumedrom 40mg IV q8h  - c/w 2L NC for supplemental O2 PRN  - f/u Pulm consult (Dr. Heredia); recs appreciated  - f/u ID consult (Dr. Correa); recs appreciated
Stable - on 2L NC - Multifactorial - Associated with cough, found to have tachycardic and low BP in ED, concerning for PNA on underlying influenza (RVP +ve for Inlfuenza). CTPE performed given hx of DVT, however, negative for PE and DUS of LE unremarkable for DVTs. CT-chest remarkable for mucous plugging, and a left sided infiltrate with possible parapneumonic effusion present. Pt also had an episode of wheezing, w/ hx of asthma, requiring 40mg Solumedrol and DuoNeb. Pt w/ nomral LV function w/ EF of 55-60%. Nomral RV w/ dilated RA. No pulm HTN.  - c/w Vancomycin and Zosyn for likely HCAP (Day 5) - vanc held in the setting of elevated vanc trough (42)  - c/w Duonebs [id number]  - c/w Pulmicort daily  - c/w Solumedrom 40mg IV q8h  - c/w 2L NC for supplemental O2 PRN  - f/u Pulm consult (Dr. Heredia); recs appreciated  - f/u ID consult (Dr. Correa); recs appreciated
Wheezing on exam today. Multifactorial - Associated with cough, found to have tachycardic and low BP in ED, concerning for PNA on underlying influenza (RVP +ve for Inlfuenza). CTPE performed given hx of DVT, however, negative for PE and DUS of LE unremarkable for DVTs. CT-chest remarkable for mucous plugging, and a left sided infiltrate with possible parapneumonic effusion present. Pt also had an episode of wheezing, w/ hx of asthma, requiring 40mg Solumedrol and DuoNeb. Pt w/ nomral LV function w/ EF of 55-60%. Nomral RV w/ dilated RA. No pulm HTN, however, remarkable for aortic valvular abnormality, concerning for vegetation. ADRYAN unremarkable for any vegetation.   - f/u BM Biopsy results  - c/w Levaquin daily (day 9)  - c/w Duonebs [id number]  - c/w Pulmicort daily  - Changed solumedrol to predonisone 50mg BID  - c/w 2L NC for supplemental O2 PRN  - f/u Pulm consult (Dr. Heredia); recs appreciated  - f/u ID consult (Dr. Correa); recs appreciated
no symptoms, eating, no obstructive issues currently
observe and trend, not candidate for workup in light of respiratory issue
stable, elective GI workup at later date after all current issues resolves
Associated with cough, found to have tachycardic and low BP in ED, likely 2/2 PNA vs influenza, less likely PE. CT angio unremarkable for PE, however, remarkable for mucous plugging, and a left sided infiltrate with possible parapneumonic effusion present. In setting of low BP, tachycardia and low grade temp of 99, will treat patient with vancomycin and zosyn for likely HCAP.   - f/u RVP to r/o influenza.  - c/w Duonebs [id number]  - c/w 2L NC for supplemental O2 for now.  - f/u Pulm consult; recs appreciated  - f/u Echocardiogram

## 2017-02-02 NOTE — PROGRESS NOTE ADULT - PROBLEM SELECTOR PLAN 10
DVT PPx: On therapeutic anticoagulation w/ Lovenox for RLE DVT    Dispo: Pending clinical improvement, PT recommending home PT    FULL CODE
DVT PPx: c/w HSQ     Dispo: Pending clinical improvement    FULL CODE

## 2017-02-02 NOTE — PROGRESS NOTE ADULT - PROBLEM SELECTOR PROBLEM 1
Shortness of breath
Anemia
R/O Anemia
Shortness of breath
Small bowel obstruction

## 2017-02-02 NOTE — PROGRESS NOTE ADULT - PROBLEM SELECTOR PLAN 5
Episode of asthma yesterday, s/p 100mg Soulmedrol, and requring MICU consult.   - c/w DuoNebs [id number]  - c/w Pulmicort daily  - c/w Solumedrol 40mg IV q8  - f/u Pulmonology consult; recs appreciated
Episode of asthma yesterday, s/p 100mg Soulmedrol, and requring MICU consult.   - c/w DuoNebs [id number]  - c/w Pulmicort daily  - c/w Solumedrol 40mg IV q8  - f/u Pulmonology consult; recs appreciated
Not wheezing on exam and hadn't received steroids or nebulizers prior.  s/p Solumedrol 125mg in the ED. Pt now wheezing this morning w/ good respiratory effort.  - c/w DuoNebs h9dxyin  - c/w Pulmicort daily  - c/w Solumedrol 40mg IV q8  - f/u Pulmonology consult; recs appreciated
Not wheezing on exam and hadn't received steroids or nebulizers prior.  s/p Solumedrol 125mg in the ED. Pt now wheezing this morning w/ good respiratory effort.  - c/w DuoNebs [id number]  - c/w Pulmicort daily  - c/w Solumedrol 40mg IV q8  - f/u Pulmonology consult; recs appreciated
Not wheezing on exam and hadn't received steroids or nebulizers prior.  s/p Solumedrol 125mg in the ED. Pt now wheezing this morning w/ good respiratory effort. s/p Solumedrol yesterday for acute onset of wheezing.   - c/w DuoNebs h1qftcg  - Started Pulmicort  - c/w Prednisone taper  - f/u Pulmonology consult; recs appreciated
Not wheezing on exam and hadn't received steroids or nebulizers prior.  s/p Solumedrol 125mg in the ED. Pt now wheezing this morning w/ good respiratory effort. s/p Solumedrol yesterday for acute onset of wheezing.   - c/w DuoNebs x5baapt  - Started Pulmicort  - c/w Prednisone taper  - f/u Pulmonology consult; recs appreciated
Stable. Not wheezing on exam today.  - c/w DuoNebs [id number]  - c/w Pulmicort daily  - Changed steroids to prednisone 50mg BID  - f/u Pulmonology consult; recs appreciated
Stable. Not wheezing on exam today.  - c/w DuoNebs [id number]  - c/w Pulmicort daily  - c/w Solumedrol 40mg IV q8  - f/u Pulmonology consult; recs appreciated
Stable. Not wheezing on exam today.  - c/w DuoNebs [id number]  - c/w Pulmicort daily  - c/w Solumedrol 40mg IV q8  - f/u Pulmonology consult; recs appreciated
Stable. Not wheezing on exam today.  - c/w DuoNebs [id number]  - c/w Pulmicort daily  - c/w Solumedrol 40mg IV q8  - f/u Pulmonology consult; recs appreciated
Stable. Not wheezing on exam today.  - c/w DuoNebs [id number]  - c/w Pulmicort daily  - c/w Solumedrol 40mg IV q8  - f/u Pulmonology consult; recs appreciated
Stable, pt does not appear to be in MS flare.   - f/u Neurology consult; recs appreciated

## 2017-02-03 VITALS
DIASTOLIC BLOOD PRESSURE: 68 MMHG | TEMPERATURE: 98 F | SYSTOLIC BLOOD PRESSURE: 109 MMHG | HEART RATE: 92 BPM | RESPIRATION RATE: 16 BRPM | OXYGEN SATURATION: 100 %

## 2017-02-03 PROCEDURE — 86225 DNA ANTIBODY NATIVE: CPT

## 2017-02-03 PROCEDURE — 82085 ASSAY OF ALDOLASE: CPT

## 2017-02-03 PROCEDURE — 99232 SBSQ HOSP IP/OBS MODERATE 35: CPT

## 2017-02-03 PROCEDURE — 83550 IRON BINDING TEST: CPT

## 2017-02-03 PROCEDURE — 85097 BONE MARROW INTERPRETATION: CPT

## 2017-02-03 PROCEDURE — 86431 RHEUMATOID FACTOR QUANT: CPT

## 2017-02-03 PROCEDURE — 83036 HEMOGLOBIN GLYCOSYLATED A1C: CPT

## 2017-02-03 PROCEDURE — 86803 HEPATITIS C AB TEST: CPT

## 2017-02-03 PROCEDURE — 85598 HEXAGNAL PHOSPH PLTLT NEUTRL: CPT

## 2017-02-03 PROCEDURE — 84484 ASSAY OF TROPONIN QUANT: CPT

## 2017-02-03 PROCEDURE — 85025 COMPLETE CBC W/AUTO DIFF WBC: CPT

## 2017-02-03 PROCEDURE — 86235 NUCLEAR ANTIGEN ANTIBODY: CPT

## 2017-02-03 PROCEDURE — 86038 ANTINUCLEAR ANTIBODIES: CPT

## 2017-02-03 PROCEDURE — 85027 COMPLETE CBC AUTOMATED: CPT

## 2017-02-03 PROCEDURE — 80048 BASIC METABOLIC PNL TOTAL CA: CPT

## 2017-02-03 PROCEDURE — 88300 SURGICAL PATH GROSS: CPT

## 2017-02-03 PROCEDURE — 87449 NOS EACH ORGANISM AG IA: CPT

## 2017-02-03 PROCEDURE — 76705 ECHO EXAM OF ABDOMEN: CPT

## 2017-02-03 PROCEDURE — 86160 COMPLEMENT ANTIGEN: CPT

## 2017-02-03 PROCEDURE — 86359 T CELLS TOTAL COUNT: CPT

## 2017-02-03 PROCEDURE — 87633 RESP VIRUS 12-25 TARGETS: CPT

## 2017-02-03 PROCEDURE — 87040 BLOOD CULTURE FOR BACTERIA: CPT

## 2017-02-03 PROCEDURE — 83880 ASSAY OF NATRIURETIC PEPTIDE: CPT

## 2017-02-03 PROCEDURE — 80202 ASSAY OF VANCOMYCIN: CPT

## 2017-02-03 PROCEDURE — 93306 TTE W/DOPPLER COMPLETE: CPT

## 2017-02-03 PROCEDURE — 83605 ASSAY OF LACTIC ACID: CPT

## 2017-02-03 PROCEDURE — 99285 EMERGENCY DEPT VISIT HI MDM: CPT | Mod: 25

## 2017-02-03 PROCEDURE — 74019 RADEX ABDOMEN 2 VIEWS: CPT

## 2017-02-03 PROCEDURE — 36415 COLL VENOUS BLD VENIPUNCTURE: CPT

## 2017-02-03 PROCEDURE — 70450 CT HEAD/BRAIN W/O DYE: CPT

## 2017-02-03 PROCEDURE — 97161 PT EVAL LOW COMPLEX 20 MIN: CPT

## 2017-02-03 PROCEDURE — 87389 HIV-1 AG W/HIV-1&-2 AB AG IA: CPT

## 2017-02-03 PROCEDURE — 82728 ASSAY OF FERRITIN: CPT

## 2017-02-03 PROCEDURE — 94640 AIRWAY INHALATION TREATMENT: CPT

## 2017-02-03 PROCEDURE — 93970 EXTREMITY STUDY: CPT

## 2017-02-03 PROCEDURE — 88313 SPECIAL STAINS GROUP 2: CPT

## 2017-02-03 PROCEDURE — 82550 ASSAY OF CK (CPK): CPT

## 2017-02-03 PROCEDURE — 87486 CHLMYD PNEUM DNA AMP PROBE: CPT

## 2017-02-03 PROCEDURE — 84155 ASSAY OF PROTEIN SERUM: CPT

## 2017-02-03 PROCEDURE — 88305 TISSUE EXAM BY PATHOLOGIST: CPT

## 2017-02-03 PROCEDURE — 97116 GAIT TRAINING THERAPY: CPT

## 2017-02-03 PROCEDURE — 77002 NEEDLE LOCALIZATION BY XRAY: CPT

## 2017-02-03 PROCEDURE — 93005 ELECTROCARDIOGRAM TRACING: CPT

## 2017-02-03 PROCEDURE — 73521 X-RAY EXAM HIPS BI 2 VIEWS: CPT

## 2017-02-03 PROCEDURE — 38221 DX BONE MARROW BIOPSIES: CPT

## 2017-02-03 PROCEDURE — 82784 ASSAY IGA/IGD/IGG/IGM EACH: CPT

## 2017-02-03 PROCEDURE — 72100 X-RAY EXAM L-S SPINE 2/3 VWS: CPT

## 2017-02-03 PROCEDURE — 82306 VITAMIN D 25 HYDROXY: CPT

## 2017-02-03 PROCEDURE — 85652 RBC SED RATE AUTOMATED: CPT

## 2017-02-03 PROCEDURE — 71045 X-RAY EXAM CHEST 1 VIEW: CPT

## 2017-02-03 PROCEDURE — 84100 ASSAY OF PHOSPHORUS: CPT

## 2017-02-03 PROCEDURE — 85730 THROMBOPLASTIN TIME PARTIAL: CPT

## 2017-02-03 PROCEDURE — 85610 PROTHROMBIN TIME: CPT

## 2017-02-03 PROCEDURE — 86334 IMMUNOFIX E-PHORESIS SERUM: CPT

## 2017-02-03 PROCEDURE — 71275 CT ANGIOGRAPHY CHEST: CPT

## 2017-02-03 PROCEDURE — 84466 ASSAY OF TRANSFERRIN: CPT

## 2017-02-03 PROCEDURE — 86140 C-REACTIVE PROTEIN: CPT

## 2017-02-03 PROCEDURE — 87581 M.PNEUMON DNA AMP PROBE: CPT

## 2017-02-03 PROCEDURE — 85613 RUSSELL VIPER VENOM DILUTED: CPT

## 2017-02-03 PROCEDURE — 80074 ACUTE HEPATITIS PANEL: CPT

## 2017-02-03 PROCEDURE — 93880 EXTRACRANIAL BILAT STUDY: CPT

## 2017-02-03 PROCEDURE — 87798 DETECT AGENT NOS DNA AMP: CPT

## 2017-02-03 PROCEDURE — 83735 ASSAY OF MAGNESIUM: CPT

## 2017-02-03 PROCEDURE — 94010 BREATHING CAPACITY TEST: CPT

## 2017-02-03 PROCEDURE — 84165 PROTEIN E-PHORESIS SERUM: CPT

## 2017-02-03 PROCEDURE — 88341 IMHCHEM/IMCYTCHM EA ADD ANTB: CPT

## 2017-02-03 PROCEDURE — 93312 ECHO TRANSESOPHAGEAL: CPT

## 2017-02-03 PROCEDURE — 86162 COMPLEMENT TOTAL (CH50): CPT

## 2017-02-03 PROCEDURE — 80053 COMPREHEN METABOLIC PANEL: CPT

## 2017-02-03 RX ORDER — GABAPENTIN 400 MG/1
1 CAPSULE ORAL
Qty: 90 | Refills: 0 | OUTPATIENT
Start: 2017-02-03 | End: 2017-03-05

## 2017-02-03 RX ORDER — FONDAPARINUX SODIUM 2.5 MG/.5ML
1 INJECTION, SOLUTION SUBCUTANEOUS
Qty: 42 | Refills: 0 | OUTPATIENT
Start: 2017-02-03 | End: 2017-02-24

## 2017-02-03 RX ORDER — ATORVASTATIN CALCIUM 80 MG/1
1 TABLET, FILM COATED ORAL
Qty: 30 | Refills: 0 | OUTPATIENT
Start: 2017-02-03 | End: 2017-03-05

## 2017-02-03 RX ORDER — CIPROFLOXACIN LACTATE 400MG/40ML
1 VIAL (ML) INTRAVENOUS
Qty: 6 | Refills: 0 | OUTPATIENT
Start: 2017-02-03 | End: 2017-02-09

## 2017-02-03 RX ORDER — IPRATROPIUM/ALBUTEROL SULFATE 18-103MCG
1 AEROSOL WITH ADAPTER (GRAM) INHALATION
Qty: 0 | Refills: 0 | COMMUNITY

## 2017-02-03 RX ORDER — BUDESONIDE, MICRONIZED 100 %
1 POWDER (GRAM) MISCELLANEOUS
Qty: 1 | Refills: 0 | OUTPATIENT
Start: 2017-02-03 | End: 2017-03-05

## 2017-02-03 RX ORDER — LEVETIRACETAM 250 MG/1
1 TABLET, FILM COATED ORAL
Qty: 60 | Refills: 0 | OUTPATIENT
Start: 2017-02-03 | End: 2017-03-05

## 2017-02-03 RX ORDER — PANTOPRAZOLE SODIUM 20 MG/1
1 TABLET, DELAYED RELEASE ORAL
Qty: 30 | Refills: 0 | OUTPATIENT
Start: 2017-02-03 | End: 2017-03-05

## 2017-02-03 RX ORDER — FLUTICASONE PROPIONATE AND SALMETEROL 50; 250 UG/1; UG/1
1 POWDER ORAL; RESPIRATORY (INHALATION)
Qty: 1 | Refills: 0 | OUTPATIENT
Start: 2017-02-03 | End: 2017-03-05

## 2017-02-03 RX ADMIN — LEVETIRACETAM 1000 MILLIGRAM(S): 250 TABLET, FILM COATED ORAL at 06:17

## 2017-02-03 RX ADMIN — Medication 1 PUFF(S): at 12:32

## 2017-02-03 RX ADMIN — Medication 3 MILLILITER(S): at 10:10

## 2017-02-03 RX ADMIN — Medication 400 MILLIGRAM(S): at 06:51

## 2017-02-03 RX ADMIN — Medication 50 MILLIGRAM(S): at 10:11

## 2017-02-03 RX ADMIN — ENOXAPARIN SODIUM 40 MILLIGRAM(S): 100 INJECTION SUBCUTANEOUS at 06:18

## 2017-02-03 RX ADMIN — METHADONE HYDROCHLORIDE 10 MILLIGRAM(S): 40 TABLET ORAL at 06:21

## 2017-02-03 RX ADMIN — Medication 3 MILLILITER(S): at 14:11

## 2017-02-03 RX ADMIN — METHADONE HYDROCHLORIDE 10 MILLIGRAM(S): 40 TABLET ORAL at 14:09

## 2017-02-03 RX ADMIN — GABAPENTIN 300 MILLIGRAM(S): 400 CAPSULE ORAL at 12:32

## 2017-02-03 RX ADMIN — PANTOPRAZOLE SODIUM 40 MILLIGRAM(S): 20 TABLET, DELAYED RELEASE ORAL at 06:18

## 2017-02-03 RX ADMIN — Medication 75 MILLIGRAM(S): at 06:18

## 2017-02-03 RX ADMIN — Medication 2: at 08:28

## 2017-02-03 RX ADMIN — Medication 400 MILLIGRAM(S): at 06:21

## 2017-02-03 RX ADMIN — Medication 325 MILLIGRAM(S): at 12:32

## 2017-02-03 RX ADMIN — Medication 3 MILLILITER(S): at 06:17

## 2017-02-03 NOTE — PROGRESS NOTE ADULT - SUBJECTIVE AND OBJECTIVE BOX
INTERVAL HPI: no cp sob palp  	  MEDICATIONS:    levoFLOXacin  Tablet 750milliGRAM(s) Oral every 24 hours  oseltamivir 75milliGRAM(s) Oral two times a day    ALBUTerol/ipratropium for Nebulization 3milliLiter(s) Nebulizer every 4 hours  buDESOnide  180 MICROgram(s) Inhaler 1Puff(s) Inhalation daily  HYDROcodone/homatropine Syrup 5milliLiter(s) Oral every 6 hours PRN    levETIRAcetam 1000milliGRAM(s) Oral two times a day  zolpidem 5milliGRAM(s) Oral at bedtime PRN  gabapentin 300milliGRAM(s) Oral daily  LORazepam     Tablet 0.5milliGRAM(s) Oral every 8 hours PRN  methadone 10milliGRAM(s) Oral every 8 hours  acetaminophen   Tablet. 650milliGRAM(s) Oral every 6 hours PRN    pantoprazole    Tablet 40milliGRAM(s) Oral before breakfast  polyethylene glycol 3350 17Gram(s) Oral two times a day  aluminum hydroxide/magnesium hydroxide/simethicone Suspension 30milliLiter(s) Oral every 4 hours PRN    insulin lispro (HumaLOG) corrective regimen sliding scale  SubCutaneous Before meals and at bedtime  atorvastatin 40milliGRAM(s) Oral at bedtime  predniSONE   Tablet 50milliGRAM(s) Oral every 12 hours    ferrous    sulfate 325milliGRAM(s) Oral daily  enoxaparin Injectable 40milliGRAM(s) SubCutaneous every 12 hours  ergocalciferol 65067Syix(s) Oral every week      REVIEW OF SYSTEMS:  [x] As per HPI  CONSTITUTIONAL: No fever, weight loss, or fatigue  RESPIRATORY: No cough, wheezing, chills or hemoptysis; No Shortness of Breath  CARDIOVASCULAR: No chest pain, palpitations, dizziness, or leg swelling  GASTROINTESTINAL: No abdominal or epigastric pain. No nausea, vomiting, or hematemesis; No diarrhea or constipation. No melena or hematochezia.  MUSCULOSKELETAL: No joint pain or swelling; No muscle, back, or extremity pain  [x] All others negative	  [ ] Unable to obtain    PHYSICAL EXAM:  T(C): 36.4, Max: 37.2 (02-02 @ 16:25)  HR: 92 (87 - 95)  BP: 109/68 (102/68 - 115/77)  RR: 16 (16 - 16)  SpO2: 100% (99% - 100%)  Wt(kg): --  I&O's Summary        Appearance: Normal	  HEENT:   Normal oral mucosa  Cardiovascular: Normal S1 S2, No JVD, No murmurs, No edema  Respiratory: Lungs clear to auscultation	  Gastrointestinal:  Soft, Non-tender, + BS	  Extremities: Normal range of motion, No clubbing, cyanosis or edema  Vascular: Peripheral pulses palpable 2+ bilaterally    TELEMETRY: 	    ECG:    	  RADIOLOGY:   CXR:  CT:  US:    CARDIAC TESTING:  Echocardiogram:  Catheterization:  Stress Test:      LABS:	 	    CARDIAC MARKERS:                                  9.8    4.4   )-----------( 474      ( 01 Feb 2017 17:35 )             32.0           proBNP:   Lipid Profile:   HgA1c:   TSH:     ASSESSMENT/PLAN: 	   AO mbile mass - ADRYAN clarification of mass as focal calcification, no thrombus, not mobile. would recommend high dose statin therapy at this time  HTN - at goal, observe  SOB - mucous plugging no PE  Hx DVT - dopplers neg  replete electrolytes

## 2017-02-03 NOTE — PROGRESS NOTE ADULT - SUBJECTIVE AND OBJECTIVE BOX
Neurology Follow up note    Name  MARK SNYDER    HPI:  Pt is a 61 year old female with PMH of anemia, asthma, DVT with noncompliance with AC, MS, multiple SBO's s/p multiple abdominal surgeries and recent CVA admitted to [facility] about 4 weeks ago for 4-6weeks who presented to the ED today after being sent in from Dr. Garay's office for tachycardia, low blood pressure and complaint of cough and sob.  Pt stated she has been having a cough with yellow phlegm since Last friday along with f/c/s and congestion and myalgias.  Pt stated her sister also was sick last week with similar symptoms. She denied wheezing at home or increased use of her inhalers.  Pt also stated she was admitted at [facility] about 4 weeks ago for a stroke that she had and was told she had a brain bleed but unclear if actual brain bleed as patient stated she was also taking lovenox at home.  Since her stroke about 4 weeks ago the patient has admitted to having continued lower extremity weakness and numbness in her toes up to mid shin with difficulty walking and getting out of bed.  Pt denied back pain.  She also stated she has had multiple SBO's about 6 of them and that she hasn't had a BM in about one week and has had a diffuse cramping abdominal pain for the last few days with an episode of nausea last week, but no vomiting.  She admitted to decreased PO intake at home.  In the ED patient had a CT angio to r/o PE and vital signs were Tmax: 99.0, HR: , BP 98/61, RR 18 and sO2 of 92% room air and 98% on 2L NC. (25 Jan 2017 22:05)      Interval History -improved mental status- no seizures- no pain complaints        REVIEW OF SYSTEMS    Vital Signs Last 24 Hrs  T(C): 36.4, Max: 37.2 (02-02 @ 16:25)  T(F): 97.6, Max: 99 (02-02 @ 16:25)  HR: 92 (85 - 95)  BP: 109/68 (102/68 - 115/77)  BP(mean): --  RR: 16 (16 - 16)  SpO2: 100% (97% - 100%)    Physical Exam-     Mental Status- awake- poor memory  Cranial Nerves-no diplopia    Gait and station-n/a    Motor- no new weakness    Reflexes- decreased    Sensation-no sensory level    Coordination-no tremors    Vascular -intact    Medications  insulin lispro (HumaLOG) corrective regimen sliding scale  SubCutaneous Before meals and at bedtime  ALBUTerol/ipratropium for Nebulization 3milliLiter(s) Nebulizer every 4 hours  levETIRAcetam 1000milliGRAM(s) Oral two times a day  ferrous    sulfate 325milliGRAM(s) Oral daily  pantoprazole    Tablet 40milliGRAM(s) Oral before breakfast  buDESOnide  180 MICROgram(s) Inhaler 1Puff(s) Inhalation daily  polyethylene glycol 3350 17Gram(s) Oral two times a day  zolpidem 5milliGRAM(s) Oral at bedtime PRN  gabapentin 300milliGRAM(s) Oral daily  LORazepam     Tablet 0.5milliGRAM(s) Oral every 8 hours PRN  enoxaparin Injectable 40milliGRAM(s) SubCutaneous every 12 hours  methadone 10milliGRAM(s) Oral every 8 hours  acetaminophen   Tablet. 650milliGRAM(s) Oral every 6 hours PRN  aluminum hydroxide/magnesium hydroxide/simethicone Suspension 30milliLiter(s) Oral every 4 hours PRN  ergocalciferol 94375Bklv(s) Oral every week  levoFLOXacin  Tablet 750milliGRAM(s) Oral every 24 hours  oseltamivir 75milliGRAM(s) Oral two times a day  atorvastatin 40milliGRAM(s) Oral at bedtime  predniSONE   Tablet 50milliGRAM(s) Oral every 12 hours  HYDROcodone/homatropine Syrup 5milliLiter(s) Oral every 6 hours PRN      Lab      Radiology    Assessment- OBS    Plan- Supportive care- no new neuro testing needed

## 2017-02-03 NOTE — PROGRESS NOTE ADULT - PROVIDER SPECIALTY LIST ADULT
Cardiology
Gastroenterology
Heme/Onc
Infectious Disease
Infectious Disease
Internal Medicine
Neurology
Psychiatry
Pulmonology
Rehab Medicine
Rheumatology
Pulmonology
Pulmonology

## 2017-02-03 NOTE — PROGRESS NOTE ADULT - SUBJECTIVE AND OBJECTIVE BOX
Patient is a 61y old  Female who presents with a chief complaint of shortness of breath (01 Feb 2017 13:57)      HPI:  Pt is a 61 year old female with PMH of anemia, asthma, DVT with noncompliance with AC, MS, multiple SBO's s/p multiple abdominal surgeries and recent CVA admitted to [facility] about 4 weeks ago for 4-6weeks who presented to the ED today after being sent in from Dr. Garay's office for tachycardia, low blood pressure and complaint of cough and sob.  Pt stated she has been having a cough with yellow phlegm since Last friday along with f/c/s and congestion and myalgias.  Pt stated her sister also was sick last week with similar symptoms. She denied wheezing at home or increased use of her inhalers.  Pt also stated she was admitted at [facility] about 4 weeks ago for a stroke that she had and was told she had a brain bleed but unclear if actual brain bleed as patient stated she was also taking lovenox at home.  Since her stroke about 4 weeks ago the patient has admitted to having continued lower extremity weakness and numbness in her toes up to mid shin with difficulty walking and getting out of bed.  Pt denied back pain.  She also stated she has had multiple SBO's about 6 of them and that she hasn't had a BM in about one week and has had a diffuse cramping abdominal pain for the last few days with an episode of nausea last week, but no vomiting.  She admitted to decreased PO intake at home.  In the ED patient had a CT angio to r/o PE and vital signs were Tmax: 99.0, HR: , BP 98/61, RR 18 and sO2 of 92% room air and 98% on 2L NC. (25 Jan 2017 22:05)    INTERVAL HPI/OVERNIGHT EVENTS:::    HEALTH ISSUES - PROBLEM Dx:  Chronic pain: Chronic pain  Asthma: Asthma  Influenza: Influenza  HCAP (healthcare-associated pneumonia): HCAP (healthcare-associated pneumonia)  Nutrition, metabolism, and development symptoms: Nutrition, metabolism, and development symptoms  Prophylactic measure: Prophylactic measure  Anemia: Anemia  Small bowel obstruction: Small bowel obstruction  MS (multiple sclerosis): MS (multiple sclerosis)  Uncomplicated asthma, unspecified asthma severity: Uncomplicated asthma, unspecified asthma severity  CVA (cerebral vascular accident): CVA (cerebral vascular accident)  History of DVT (deep vein thrombosis): History of DVT (deep vein thrombosis)  Shortness of breath: Shortness of breath          PAST MEDICAL & SURGICAL HISTORY:  CVA (cerebral vascular accident)  Anemia  G tube feedings  History of DVT (deep vein thrombosis)  MS (multiple sclerosis)  Small bowel obstruction: multiple, recurrent  Asthma  SBO (small bowel obstruction): multiple          Consultant NOTE  REVIEWED  (   )    REVIEW OF SYSTEMS:  [x] As per HPI  CONSTITUTIONAL: No fever, weight loss, or fatigue  RESPIRATORY: No cough, wheezing, chills or hemoptysis; No Shortness of Breath  CARDIOVASCULAR: No chest pain, palpitations, dizziness, or leg swelling  GASTROINTESTINAL: No abdominal or epigastric pain. No nausea, vomiting, or hematemesis; No diarrhea or constipation. No melena or hematochezia.  MUSCULOSKELETAL: No joint pain or swelling; No muscle, back, or extremity pain  PSYCH    awake, alert       [x] All others negative	  [ ] Unable to obtain          Vital Signs Last 24 Hrs  T(C): 36.4, Max: 37.2 (02-02 @ 16:25)  T(F): 97.6, Max: 99 (02-02 @ 16:25)  HR: 87 (85 - 95)  BP: 115/77 (102/68 - 115/77)  BP(mean): --  RR: 16 (16 - 16)  SpO2: 100% (97% - 100%)        PHYSICAL EXAMINATION:                                    (    )  NO CHANGE  Appearance: Normal	  HEENT:   Normal oral mucosa, PERRL, EOMI	  Neck: Supple, + JVD/ - JVD; Carotid Bruit   Cardiovascular: Normal S1 S2, No JVD, No murmurs,   Respiratory: Lungs clear to auscultation/Decreased Breath Sounds/No Rales, Rhonchi, Wheezing	  Gastrointestinal:  Soft, Non-tender, + BS	  Skin: No rashes, No ecchymoses, No cyanosis  Extremities: Normal range of motion, No clubbing, cyanosis or edema  Vascular: Peripheral pulses palpable 2+ bilaterally  Neurologic: Non-focal  Psychiatry: A & O x 3, Mood & affect appropriate    insulin lispro (HumaLOG) corrective regimen sliding scale  SubCutaneous Before meals and at bedtime  ALBUTerol/ipratropium for Nebulization 3milliLiter(s) Nebulizer every 4 hours  levETIRAcetam 1000milliGRAM(s) Oral two times a day  ferrous    sulfate 325milliGRAM(s) Oral daily  pantoprazole    Tablet 40milliGRAM(s) Oral before breakfast  buDESOnide  180 MICROgram(s) Inhaler 1Puff(s) Inhalation daily  polyethylene glycol 3350 17Gram(s) Oral two times a day  zolpidem 5milliGRAM(s) Oral at bedtime PRN  gabapentin 300milliGRAM(s) Oral daily  LORazepam     Tablet 0.5milliGRAM(s) Oral every 8 hours PRN  enoxaparin Injectable 40milliGRAM(s) SubCutaneous every 12 hours  methadone 10milliGRAM(s) Oral every 8 hours  acetaminophen   Tablet. 650milliGRAM(s) Oral every 6 hours PRN  aluminum hydroxide/magnesium hydroxide/simethicone Suspension 30milliLiter(s) Oral every 4 hours PRN  ergocalciferol 33412Lsxs(s) Oral every week  levoFLOXacin  Tablet 750milliGRAM(s) Oral every 24 hours  oseltamivir 75milliGRAM(s) Oral two times a day  atorvastatin 40milliGRAM(s) Oral at bedtime  predniSONE   Tablet 50milliGRAM(s) Oral every 12 hours  HYDROcodone/homatropine Syrup 5milliLiter(s) Oral every 6 hours PRN                                      9.8    4.4   )-----------( 474      ( 01 Feb 2017 17:35 )             32.0             CAPILLARY BLOOD GLUCOSE  201 (03 Feb 2017 07:34)  108 (02 Feb 2017 22:03)  68 (02 Feb 2017 21:37)  170 (02 Feb 2017 17:07)  104 (02 Feb 2017 11:24) Patient is a 61y old  Female who presents with a chief complaint of shortness of breath (01 Feb 2017 13:57)      HPI:  Pt is a 61 year old female with PMH of anemia, asthma, DVT with noncompliance with AC, MS, multiple SBO's s/p multiple abdominal surgeries and recent CVA admitted to [facility] about 4 weeks ago for 4-6weeks who presented to the ED today after being sent in from Dr. Garay's office for tachycardia, low blood pressure and complaint of cough and sob.  Pt stated she has been having a cough with yellow phlegm since Last friday along with f/c/s and congestion and myalgias.  Pt stated her sister also was sick last week with similar symptoms. She denied wheezing at home or increased use of her inhalers.  Pt also stated she was admitted at [facility] about 4 weeks ago for a stroke that she had and was told she had a brain bleed but unclear if actual brain bleed as patient stated she was also taking lovenox at home.  Since her stroke about 4 weeks ago the patient has admitted to having continued lower extremity weakness and numbness in her toes up to mid shin with difficulty walking and getting out of bed.  Pt denied back pain.  She also stated she has had multiple SBO's about 6 of them and that she hasn't had a BM in about one week and has had a diffuse cramping abdominal pain for the last few days with an episode of nausea last week, but no vomiting.  She admitted to decreased PO intake at home.  In the ED patient had a CT angio to r/o PE and vital signs were Tmax: 99.0, HR: , BP 98/61, RR 18 and sO2 of 92% room air and 98% on 2L NC. (25 Jan 2017 22:05)    INTERVAL HPI/OVERNIGHT EVENTS:::better, ch meds PO    HEALTH ISSUES - PROBLEM Dx:  Chronic pain: Chronic pain  Asthma: Asthma  Influenza: Influenza  HCAP (healthcare-associated pneumonia): HCAP (healthcare-associated pneumonia)  Nutrition, metabolism, and development symptoms: Nutrition, metabolism, and development symptoms  Prophylactic measure: Prophylactic measure  Anemia: Anemia  Small bowel obstruction: Small bowel obstruction  MS (multiple sclerosis): MS (multiple sclerosis)  Uncomplicated asthma, unspecified asthma severity: Uncomplicated asthma, unspecified asthma severity  CVA (cerebral vascular accident): CVA (cerebral vascular accident)  History of DVT (deep vein thrombosis): History of DVT (deep vein thrombosis)  Shortness of breath: Shortness of breath          PAST MEDICAL & SURGICAL HISTORY:  CVA (cerebral vascular accident)  Anemia  G tube feedings  History of DVT (deep vein thrombosis)  MS (multiple sclerosis)  Small bowel obstruction: multiple, recurrent  Asthma  SBO (small bowel obstruction): multiple          Consultant NOTE  REVIEWED  (   )    REVIEW OF SYSTEMS:  [x] As per HPI  CONSTITUTIONAL: No fever, weight loss, or fatigue  RESPIRATORY: No cough, wheezing, chills or hemoptysis; No Shortness of Breath  CARDIOVASCULAR: No chest pain, palpitations, dizziness, or leg swelling  GASTROINTESTINAL: No abdominal or epigastric pain. No nausea, vomiting, or hematemesis; No diarrhea or constipation. No melena or hematochezia.  MUSCULOSKELETAL: No joint pain or swelling; No muscle, back, or extremity pain  PSYCH    awake, alert   confused    [x] All others negative	  [ ] Unable to obtain          Vital Signs Last 24 Hrs  T(C): 36.4, Max: 37.2 (02-02 @ 16:25)  T(F): 97.6, Max: 99 (02-02 @ 16:25)  HR: 87 (85 - 95)  BP: 115/77 (102/68 - 115/77)  BP(mean): --  RR: 16 (16 - 16)  SpO2: 100% (97% - 100%)        PHYSICAL EXAMINATION:                                    (    )  NO CHANGE  Appearance: Normal	  HEENT:   Normal oral mucosa, PERRL, EOMI	  Neck: Supple, + JVD/ - JVD; Carotid Bruit   Cardiovascular: Normal S1 S2, No JVD, No murmurs,   Respiratory: Lungs clear to auscultation/Decreased Breath Sounds/No Rales, Rhonchi, Wheezing	  Gastrointestinal:  Soft, Non-tender, + BS	  Skin: No rashes, No ecchymoses, No cyanosis  Extremities: Normal range of motion, No clubbing, cyanosis or edema  Vascular: Peripheral pulses palpable 2+ bilaterally  Neurologic: Non-focal  Psychiatry:confused    insulin lispro (HumaLOG) corrective regimen sliding scale  SubCutaneous Before meals and at bedtime  ALBUTerol/ipratropium for Nebulization 3milliLiter(s) Nebulizer every 4 hours  levETIRAcetam 1000milliGRAM(s) Oral two times a day  ferrous    sulfate 325milliGRAM(s) Oral daily  pantoprazole    Tablet 40milliGRAM(s) Oral before breakfast  buDESOnide  180 MICROgram(s) Inhaler 1Puff(s) Inhalation daily  polyethylene glycol 3350 17Gram(s) Oral two times a day  zolpidem 5milliGRAM(s) Oral at bedtime PRN  gabapentin 300milliGRAM(s) Oral daily  LORazepam     Tablet 0.5milliGRAM(s) Oral every 8 hours PRN  enoxaparin Injectable 40milliGRAM(s) SubCutaneous every 12 hours  methadone 10milliGRAM(s) Oral every 8 hours  acetaminophen   Tablet. 650milliGRAM(s) Oral every 6 hours PRN  aluminum hydroxide/magnesium hydroxide/simethicone Suspension 30milliLiter(s) Oral every 4 hours PRN  ergocalciferol 73408Mgli(s) Oral every week  levoFLOXacin  Tablet 750milliGRAM(s) Oral every 24 hours  oseltamivir 75milliGRAM(s) Oral two times a day  atorvastatin 40milliGRAM(s) Oral at bedtime  predniSONE   Tablet 50milliGRAM(s) Oral every 12 hours  HYDROcodone/homatropine Syrup 5milliLiter(s) Oral every 6 hours PRN                                      9.8    4.4   )-----------( 474      ( 01 Feb 2017 17:35 )             32.0             CAPILLARY BLOOD GLUCOSE  201 (03 Feb 2017 07:34)  108 (02 Feb 2017 22:03)  68 (02 Feb 2017 21:37)  170 (02 Feb 2017 17:07)  104 (02 Feb 2017 11:24)

## 2017-02-06 DIAGNOSIS — Z91.040 LATEX ALLERGY STATUS: ICD-10-CM

## 2017-02-06 DIAGNOSIS — J45.901 UNSPECIFIED ASTHMA WITH (ACUTE) EXACERBATION: ICD-10-CM

## 2017-02-06 DIAGNOSIS — J11.00 INFLUENZA DUE TO UNIDENTIFIED INFLUENZA VIRUS WITH UNSPECIFIED TYPE OF PNEUMONIA: ICD-10-CM

## 2017-02-06 DIAGNOSIS — Z86.73 PERSONAL HISTORY OF TRANSIENT ISCHEMIC ATTACK (TIA), AND CEREBRAL INFARCTION WITHOUT RESIDUAL DEFICITS: ICD-10-CM

## 2017-02-06 DIAGNOSIS — G35 MULTIPLE SCLEROSIS: ICD-10-CM

## 2017-02-06 DIAGNOSIS — I82.409 ACUTE EMBOLISM AND THROMBOSIS OF UNSPECIFIED DEEP VEINS OF UNSPECIFIED LOWER EXTREMITY: ICD-10-CM

## 2017-02-06 DIAGNOSIS — D72.819 DECREASED WHITE BLOOD CELL COUNT, UNSPECIFIED: ICD-10-CM

## 2017-02-06 DIAGNOSIS — D64.9 ANEMIA, UNSPECIFIED: ICD-10-CM

## 2017-02-06 DIAGNOSIS — G89.29 OTHER CHRONIC PAIN: ICD-10-CM

## 2017-02-06 DIAGNOSIS — Z91.018 ALLERGY TO OTHER FOODS: ICD-10-CM

## 2017-02-06 LAB — HEMATOPATHOLOGY REPORT: SIGNIFICANT CHANGE UP

## 2017-02-06 RX ORDER — ERGOCALCIFEROL 1.25 MG/1
1 CAPSULE ORAL
Qty: 4 | Refills: 0 | OUTPATIENT
Start: 2017-02-06 | End: 2017-03-08

## 2017-02-08 DIAGNOSIS — I82.412 ACUTE EMBOLISM AND THROMBOSIS OF LEFT FEMORAL VEIN: ICD-10-CM

## 2017-02-08 DIAGNOSIS — Z91.14 PATIENT'S OTHER NONCOMPLIANCE WITH MEDICATION REGIMEN: ICD-10-CM

## 2017-02-08 DIAGNOSIS — D63.8 ANEMIA IN OTHER CHRONIC DISEASES CLASSIFIED ELSEWHERE: ICD-10-CM

## 2018-12-09 NOTE — PROVIDER CONTACT NOTE (OTHER) - REASON
Patient complains of increasing RLE pain Telephone Encounter by Brittnee Cm at 01/16/17 10:09 AM     Author:  Brittnee Cm Service:  (none) Author Type:  (none)     Filed:  01/16/17 10:21 AM Encounter Date:  1/13/2017 Status:  Signed     :  Brittnee Cm            Spoke with patient who is calling to make sure therapy is being covered by work comp. And to make sure Adjustor has all the office notes etc.  Patient states he has been billed $30 \"co-pay\" for therapy.  Writer called [facility] spoke with Kiesha who states all therapy appointments are being billed under work comp and the only \"co-pay\" billing is for primary care.  Writer also left message for Terri (Adjustor) asking if she has all the notes?  Only asked Terri to return our call if she is in need of anything from us.[KL1.1M]         Revision History        User Key Date/Time User Provider Type Action    > KL1.1 01/16/17 10:21 AM Brittnee Cm (none) Sign    M - Manual
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
## 2019-11-27 NOTE — PROGRESS NOTE ADULT - SUBJECTIVE AND OBJECTIVE BOX
CC/ HPI 60 y/o female admitted with respiratory insufficiency, AH3 influenza, LLL pneumonia , multiple sclerosis, asthma, resting comfortably in bed, wants to go home, denies dyspnea.    PAST MEDICAL & SURGICAL HISTORY:  CVA (cerebral vascular accident)  Anemia  G tube feedings  History of DVT (deep vein thrombosis)  MS (multiple sclerosis)  Small bowel obstruction: multiple, recurrent  Asthma  SBO (small bowel obstruction): multiple    SOCHX:  + tobacco    FMHX: FA/MO  - contributory     ROS reviewed below with positive findings marked (+) :  GEN:  fever, chills ENT: tracheostomy,   epistaxis,  sinusitis COR: CAD, CHF,  HTN, dysrhythmia PUL: COPD, ILD, asthma, pneumonia GI: +PEG, dysphagia, hemorrhage, other BRENDA: kidney disease, electrolyte disorder HEM:  +anemia, +thrombus, coagulopathy, cancer ENDO:  thyroid disease, diabetes mellitus CNS:  dementia, +stroke, seizure, PSY:  depression, anxiety, other    MEDICATIONS  (STANDING):  insulin lispro (HumaLOG) corrective regimen sliding scale  SubCutaneous Before meals and at bedtime  ALBUTerol/ipratropium for Nebulization 3milliLiter(s) Nebulizer every 4 hours  levETIRAcetam 1000milliGRAM(s) Oral two times a day  ferrous    sulfate 325milliGRAM(s) Oral daily  pantoprazole    Tablet 40milliGRAM(s) Oral before breakfast  buDESOnide  180 MICROgram(s) Inhaler 1Puff(s) Inhalation daily  polyethylene glycol 3350 17Gram(s) Oral two times a day  gabapentin 300milliGRAM(s) Oral daily  enoxaparin Injectable 40milliGRAM(s) SubCutaneous every 12 hours  methadone 10milliGRAM(s) Oral every 8 hours  ergocalciferol 18530Kyti(s) Oral every week  levoFLOXacin  Tablet 750milliGRAM(s) Oral every 24 hours  oseltamivir 75milliGRAM(s) Oral two times a day  atorvastatin 40milliGRAM(s) Oral at bedtime  predniSONE   Tablet 50milliGRAM(s) Oral every 12 hours    MEDICATIONS  (PRN):  zolpidem 5milliGRAM(s) Oral at bedtime PRN Insomnia  LORazepam     Tablet 0.5milliGRAM(s) Oral every 8 hours PRN Anxiety  acetaminophen   Tablet. 650milliGRAM(s) Oral every 6 hours PRN Moderate Pain (4 - 6)  aluminum hydroxide/magnesium hydroxide/simethicone Suspension 30milliLiter(s) Oral every 4 hours PRN Dyspepsia  HYDROcodone/homatropine Syrup 5milliLiter(s) Oral every 6 hours PRN Cough      Vital Signs Last 24 Hrs  T(C): 36.4, Max: 37.2 (02-02 @ 16:25)  T(F): 97.6, Max: 99 (02-02 @ 16:25)  HR: 92 (87 - 95)  BP: 109/68 (102/68 - 115/77)  BP(mean): --  RR: 16 (16 - 16)  SpO2: 100% (99% - 100%)    GENERAL:         comfortable,  - distress.  HEENT:            - trauma,  - icterus,  - injection,  - nasal discharge.  NECK:              - jugular venous distention, - thyromegaly.  LYMPH:           - lymphadenopathy, - masses.  RESP:               + wheezes,   - rhonchi,   - rales.   COR:                S1S2 RRR  - murmurs,  - gallops,  - rubs.  ABD:                bowel sounds,   soft, - tender, - distended, - organomegaly.  EXT/MSC:         - cyanosis,  + clubbing,  - edema.    NEURO:             alert,   responds to stimuli.                          9.8    4.4   )-----------( 474      ( 01 Feb 2017 17:35 )             32.0     CXR (02/02)  Improved aeration left lower lobe      CT chest (1/25)   1. No evidence of pulmonary artery emboli. No evidence of right   ventricular strain.  2. Findings compatible with small large airway inflammation with   bronchial wall thickening and scattered tree-in-bud micronodular pattern.   There is a mucous plugging are noted within the left lower lobe.  3. Focal consolidation/atelectasis involving the left lower lobe with a   small possible left parapneumonic effusion.    Spirometry (02/02)  Very severe obstructive pulmonary dysfunction    ASSESSMENT/PLAN    1)  Pneumonia  2)  Asthma  3)  Influenza  4)  DVT      Repeat chest radiograph is improved  Patient clinically improved  Bronchodilators:  Atrovent/ albuterol q 4 hours  Corticosteroids:  prednisone/budesonide  ID/Antibiotics:  Levaquin  Cardiac/HTN:  BP stable  GI: Rx/ prophylaxis c PPI/H2B  Heme: Rx/VT prophylaxis c AC  Discussed with medical team Yes

## 2020-06-18 NOTE — ED PROVIDER NOTE - CHIEF COMPLAINT
The patient is a 61y Female complaining of shortness of breath. Complex Repair Preamble Text (Leave Blank If You Do Not Want): Extensive wide undermining was performed.

## 2021-09-09 NOTE — H&P ADULT. - VENOUS THROMBOEMBOLISM
yes
Additional Notes: Patient consent was obtained to proceed with the visit and recommended plan of care after discussion of all risks and benefits, including the risks of COVID-19 exposure.
Detail Level: Simple

## 2022-02-24 NOTE — CONSULT NOTE ADULT - CONSULT REQUESTED BY NAME
Dr Garay
Dr. Avery
Dr. Garay
Dr. Garay
Dr. Gramajo
Dr. Rajendra Garay
Rajendra Garay M.D.
Rajendra Garay MD
Tanisha
luis
n/a

## 2022-10-18 NOTE — ED PROVIDER NOTE - MEDICAL DECISION MAKING DETAILS
69 yo M with PMH HTN, HLD, asthma, diverticulitis with colovescicular fistula and colon cancer s/p R hemicolectomy (2016) and now s/p exploratory laparotomy, segmental colon resection, resection of colovesicular fistula, partial cystectomy, bladder diverticulectomy, colorectal anastomosis, rigid sigmoidoscopy, loop ileostomy creation (10/06/22) complicated by a bladder leak (elevated Cr in ROSA) presenting with decreased ileostomy output and feeling bloated. Patient had called the office and was told to get a CT scan done prior to his visit with Dr. Kern tomorrow. He was told by radiology that he had an obstruction and to come to the ED. Reports abdominal pain around the ostomy. Reports that he has had decreased output from his ostomy in the past day. Usually he changes the bag 3-4 times a day but he geovanny changed it once since yesterday. Patient denies nausa/vomiting, denies fevers chills.     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 CT scan with SBO and complex fluid collection within the  anterior pelvis measuring 10 by 5.6 cm        PMH: HTN, HLD, asthma, diverticulitis with colorvesicular fistula and colon cancer   PSH: R hemicolectomy (2016), left inguinal hernia repair  Fam Hx: father - prostate, bladder, liver, kidney cancer; mother - breast cancer; sister - breast cancer; sister - MM  Soc: on and off smoker for 40 years (1 pack a day, hasn't smoked for a couple of months); drinks 2 glasses of wine a night, no recreational drugs, work- pharmacist  Meds: simvastatin, losartan, albuterol, lamotil       61y female h/o DVT/PE noncompliant with xarelto c/o CP/SOB. Arrival vitals - hypotensive, tachycardic, O2 92%. Concern for PE. Labs, CTA chest pending. Spoke with Dr Bustillo.

## 2023-03-14 NOTE — DISCHARGE NOTE ADULT - MEDICATION SUMMARY - MEDICATIONS TO TAKE
1 Principal Discharge DX:	Encounter for wound re-check   I will START or STAY ON the medications listed below when I get home from the hospital:    predniSONE 10 mg oral tablet  -- 5 tab(s) BID x 2d; 4 tab(s) BID x 2d; 3 tab(s) BID x 2d; 2 tab(s) BID x 2d; 1 tab(s) BID x 2d; 1 tab(s) daily x 2d  -- It is very important that you take or use this exactly as directed.  Do not skip doses or discontinue unless directed by your doctor.  Obtain medical advice before taking any non-prescription drugs as some may affect the action of this medication.  Take with food or milk.    -- Indication: For Asthma exacerbation    methadone 10 mg oral tablet  -- 1 tab(s) by mouth 5 times a day  -- Indication: For Pain    calcium carbonate 1250 mg (500 mg elemental calcium) oral tablet  -- 1 tab(s) by mouth 2 times a day  -- Indication: For Nutrition, metabolism, and development symptoms    Xarelto 15 mg oral tablet  -- 1 tab(s) by mouth 2 times a day  -- Check with your doctor before becoming pregnant.  It is very important that you take or use this exactly as directed.  Do not skip doses or discontinue unless directed by your doctor.  Obtain medical advice before taking any non-prescription drugs as some may affect the action of this medication.  Take with food.    -- Indication: For Deep vein thrombosis    gabapentin 300 mg oral capsule  -- 1 cap(s) by mouth 3 times a day  -- It is very important that you take or use this exactly as directed.  Do not skip doses or discontinue unless directed by your doctor.  May cause drowsiness.  Alcohol may intensify this effect.  Use care when operating dangerous machinery.    -- Indication: For Neuropathic pain    levETIRAcetam 1000 mg oral tablet  -- 1 tab(s) by mouth 2 times a day  -- Indication: For Prophylactic measure    LORazepam 0.5 mg oral tablet  -- 1 tab(s) by mouth every 8 hours, As needed, Anxiety  -- Indication: For Anxiety    atorvastatin 40 mg oral tablet  -- 1 tab(s) by mouth once a day (at bedtime)  -- Indication: For Hyperlipidemia    zolpidem 5 mg oral tablet  -- 1 tab(s) by mouth once a day (at bedtime), As needed, Insomnia  -- Indication: For Insomnia    Combivent Respimat CFC free 20 mcg-100 mcg/inh inhalation aerosol  -- 1 puff(s) inhaled 4 times a day, As Needed for wheezing  -- Indication: For Asthma/COPD    fluticasone-salmeterol 250 mcg-50 mcg inhalation powder  -- 1 puff(s) inhaled 2 times a day  -- Check with your doctor before becoming pregnant.  For inhalation only.  Obtain medical advice before taking any non-prescription drugs as some may affect the action of this medication.  Rinse mouth thoroughly after use.    -- Indication: For Asthma    ferrous sulfate 325 mg (65 mg elemental iron) oral tablet  -- 1 tab(s) by mouth once a day  -- Indication: For Iron deficiency    pantoprazole 40 mg oral delayed release tablet  -- 1 tab(s) by mouth once a day (before a meal)  -- Indication: For Prophylactic measure    levoFLOXacin 750 mg oral tablet  -- 1 tab(s) by mouth every 24 hours  -- Indication: For Pneumonia    ergocalciferol 50,000 intl units (1.25 mg) oral capsule  -- 1 cap(s) by mouth once a week  -- Indication: For Nutrition, metabolism, and development symptoms

## 2023-09-13 ENCOUNTER — HOSPITAL ENCOUNTER (INPATIENT)
Dept: HOSPITAL 74 - JER | Age: 68
LOS: 2 days | Discharge: NURSING, REHAB OR LONG-TERM CARE | DRG: 191 | End: 2023-09-15
Attending: FAMILY MEDICINE | Admitting: INTERNAL MEDICINE
Payer: COMMERCIAL

## 2023-09-13 VITALS — BODY MASS INDEX: 23.3 KG/M2

## 2023-09-13 DIAGNOSIS — Z99.81: ICD-10-CM

## 2023-09-13 DIAGNOSIS — I10: ICD-10-CM

## 2023-09-13 DIAGNOSIS — E78.5: ICD-10-CM

## 2023-09-13 DIAGNOSIS — J45.901: ICD-10-CM

## 2023-09-13 DIAGNOSIS — F17.210: ICD-10-CM

## 2023-09-13 DIAGNOSIS — J44.1: Primary | ICD-10-CM

## 2023-09-13 LAB
ALBUMIN SERPL-MCNC: 3 G/DL (ref 3.4–5)
ALP SERPL-CCNC: 83 U/L (ref 45–117)
ALT SERPL-CCNC: 26 U/L (ref 13–61)
ANION GAP SERPL CALC-SCNC: 3 MMOL/L (ref 8–16)
ANISOCYTOSIS BLD QL: (no result)
AST SERPL-CCNC: 40 U/L (ref 15–37)
BILIRUB SERPL-MCNC: 0.2 MG/DL (ref 0.2–1)
BUN SERPL-MCNC: 25.6 MG/DL (ref 7–18)
CALCIUM SERPL-MCNC: 9.5 MG/DL (ref 8.5–10.1)
CHLORIDE SERPL-SCNC: 113 MMOL/L (ref 98–107)
CO2 SERPL-SCNC: 30 MMOL/L (ref 21–32)
CREAT SERPL-MCNC: 0.6 MG/DL (ref 0.55–1.3)
DEPRECATED RDW RBC AUTO: 17.1 % (ref 11.6–15.6)
GLUCOSE SERPL-MCNC: 100 MG/DL (ref 74–106)
HCT VFR BLD CALC: 39.6 % (ref 32.4–45.2)
HGB BLD-MCNC: 12.6 GM/DL (ref 10.7–15.3)
MACROCYTES BLD QL: 0
MCH RBC QN AUTO: 30.2 PG (ref 25.7–33.7)
MCHC RBC AUTO-ENTMCNC: 31.9 G/DL (ref 32–36)
MCV RBC: 94.8 FL (ref 80–96)
OVALOCYTES BLD QL SMEAR: (no result)
PLATELET # BLD AUTO: 720 10^3/UL (ref 134–434)
PMV BLD: 8.4 FL (ref 7.5–11.1)
POTASSIUM SERPLBLD-SCNC: 5.6 MMOL/L (ref 3.5–5.1)
PROT SERPL-MCNC: 6.4 G/DL (ref 6.4–8.2)
RBC # BLD AUTO: 4.18 M/MM3 (ref 3.6–5.2)
SODIUM SERPL-SCNC: 145 MMOL/L (ref 136–145)
WBC # BLD AUTO: 14.2 K/MM3 (ref 4–10)

## 2023-09-13 PROCEDURE — G0378 HOSPITAL OBSERVATION PER HR: HCPCS

## 2023-09-13 RX ADMIN — IPRATROPIUM BROMIDE AND ALBUTEROL SULFATE SCH AMP: .5; 3 SOLUTION RESPIRATORY (INHALATION) at 21:55

## 2023-09-13 RX ADMIN — APIXABAN SCH MG: 2.5 TABLET, FILM COATED ORAL at 23:53

## 2023-09-13 RX ADMIN — IPRATROPIUM BROMIDE AND ALBUTEROL SULFATE SCH AMP: .5; 3 SOLUTION RESPIRATORY (INHALATION) at 21:36

## 2023-09-13 RX ADMIN — IPRATROPIUM BROMIDE AND ALBUTEROL SULFATE SCH AMP: .5; 3 SOLUTION RESPIRATORY (INHALATION) at 20:49

## 2023-09-13 RX ADMIN — IPRATROPIUM BROMIDE AND ALBUTEROL SULFATE SCH AMP: .5; 3 SOLUTION RESPIRATORY (INHALATION) at 21:28

## 2023-09-14 LAB
ANION GAP SERPL CALC-SCNC: 9 MMOL/L (ref 8–16)
ANISOCYTOSIS BLD QL: (no result)
BUN SERPL-MCNC: 20.8 MG/DL (ref 7–18)
CALCIUM SERPL-MCNC: 9 MG/DL (ref 8.5–10.1)
CHLORIDE SERPL-SCNC: 106 MMOL/L (ref 98–107)
CO2 SERPL-SCNC: 27 MMOL/L (ref 21–32)
CREAT SERPL-MCNC: 0.7 MG/DL (ref 0.55–1.3)
DEPRECATED RDW RBC AUTO: 16.7 % (ref 11.6–15.6)
GLUCOSE SERPL-MCNC: 222 MG/DL (ref 74–106)
HCT VFR BLD CALC: 36.2 % (ref 32.4–45.2)
HGB BLD-MCNC: 11 GM/DL (ref 10.7–15.3)
MACROCYTES BLD QL: 0
MCH RBC QN AUTO: 29.2 PG (ref 25.7–33.7)
MCHC RBC AUTO-ENTMCNC: 30.5 G/DL (ref 32–36)
MCV RBC: 95.9 FL (ref 80–96)
OVALOCYTES BLD QL SMEAR: (no result)
PLATELET # BLD AUTO: 633 10^3/UL (ref 134–434)
PMV BLD: 8 FL (ref 7.5–11.1)
POTASSIUM SERPLBLD-SCNC: 4.6 MMOL/L (ref 3.5–5.1)
RBC # BLD AUTO: 3.77 M/MM3 (ref 3.6–5.2)
SODIUM SERPL-SCNC: 143 MMOL/L (ref 136–145)
WBC # BLD AUTO: 12.5 K/MM3 (ref 4–10)

## 2023-09-14 RX ADMIN — PREDNISONE SCH MG: 10 TABLET ORAL at 21:51

## 2023-09-14 RX ADMIN — BUDESONIDE SCH AMP: 0.5 SUSPENSION RESPIRATORY (INHALATION) at 08:00

## 2023-09-14 RX ADMIN — APIXABAN SCH MG: 2.5 TABLET, FILM COATED ORAL at 21:50

## 2023-09-14 RX ADMIN — IPRATROPIUM BROMIDE AND ALBUTEROL SULFATE SCH AMP: .5; 3 SOLUTION RESPIRATORY (INHALATION) at 20:16

## 2023-09-14 RX ADMIN — NICOTINE SCH MG: 21 PATCH TRANSDERMAL at 11:17

## 2023-09-14 RX ADMIN — IPRATROPIUM BROMIDE AND ALBUTEROL SULFATE SCH AMP: .5; 3 SOLUTION RESPIRATORY (INHALATION) at 11:38

## 2023-09-14 RX ADMIN — METHYLPREDNISOLONE SODIUM SUCCINATE SCH MG: 40 INJECTION, POWDER, FOR SOLUTION INTRAMUSCULAR; INTRAVENOUS at 01:43

## 2023-09-14 RX ADMIN — AMLODIPINE BESYLATE SCH MG: 5 TABLET ORAL at 11:16

## 2023-09-14 RX ADMIN — METHYLPREDNISOLONE SODIUM SUCCINATE SCH: 40 INJECTION, POWDER, FOR SOLUTION INTRAMUSCULAR; INTRAVENOUS at 11:53

## 2023-09-14 RX ADMIN — IPRATROPIUM BROMIDE AND ALBUTEROL SULFATE SCH AMP: .5; 3 SOLUTION RESPIRATORY (INHALATION) at 08:00

## 2023-09-14 RX ADMIN — BUDESONIDE SCH AMP: 0.5 SUSPENSION RESPIRATORY (INHALATION) at 20:16

## 2023-09-14 RX ADMIN — ASPIRIN 81 MG SCH MG: 81 TABLET ORAL at 11:16

## 2023-09-14 RX ADMIN — PANTOPRAZOLE SODIUM SCH MG: 20 TABLET, DELAYED RELEASE ORAL at 11:16

## 2023-09-14 RX ADMIN — APIXABAN SCH MG: 2.5 TABLET, FILM COATED ORAL at 11:16

## 2023-09-15 VITALS
RESPIRATION RATE: 17 BRPM | TEMPERATURE: 98.4 F | SYSTOLIC BLOOD PRESSURE: 123 MMHG | DIASTOLIC BLOOD PRESSURE: 72 MMHG | HEART RATE: 102 BPM

## 2023-09-15 RX ADMIN — BUDESONIDE SCH AMP: 0.5 SUSPENSION RESPIRATORY (INHALATION) at 07:15

## 2023-09-15 RX ADMIN — PREDNISONE SCH MG: 10 TABLET ORAL at 10:48

## 2023-09-15 RX ADMIN — AMLODIPINE BESYLATE SCH MG: 5 TABLET ORAL at 10:48

## 2023-09-15 RX ADMIN — IPRATROPIUM BROMIDE AND ALBUTEROL SULFATE SCH AMP: .5; 3 SOLUTION RESPIRATORY (INHALATION) at 07:15

## 2023-09-15 RX ADMIN — CEFUROXIME AXETIL SCH: 250 TABLET, FILM COATED ORAL at 00:27

## 2023-09-15 RX ADMIN — PANTOPRAZOLE SODIUM SCH MG: 20 TABLET, DELAYED RELEASE ORAL at 10:48

## 2023-09-15 RX ADMIN — ASPIRIN 81 MG SCH MG: 81 TABLET ORAL at 10:48

## 2023-09-15 RX ADMIN — IPRATROPIUM BROMIDE AND ALBUTEROL SULFATE SCH AMP: .5; 3 SOLUTION RESPIRATORY (INHALATION) at 11:26

## 2023-09-15 RX ADMIN — CEFUROXIME AXETIL SCH MG: 250 TABLET, FILM COATED ORAL at 10:47

## 2023-09-15 RX ADMIN — APIXABAN SCH MG: 2.5 TABLET, FILM COATED ORAL at 10:48

## 2023-09-15 RX ADMIN — NICOTINE SCH MG: 21 PATCH TRANSDERMAL at 10:47

## 2023-09-15 RX ADMIN — IPRATROPIUM BROMIDE AND ALBUTEROL SULFATE SCH AMP: .5; 3 SOLUTION RESPIRATORY (INHALATION) at 15:12

## 2023-09-30 ENCOUNTER — HOSPITAL ENCOUNTER (INPATIENT)
Dept: HOSPITAL 74 - JER | Age: 68
LOS: 4 days | Discharge: NURSING, REHAB OR LONG-TERM CARE | DRG: 190 | End: 2023-10-04
Attending: FAMILY MEDICINE | Admitting: FAMILY MEDICINE
Payer: COMMERCIAL

## 2023-09-30 VITALS — BODY MASS INDEX: 26.5 KG/M2

## 2023-09-30 DIAGNOSIS — E78.5: ICD-10-CM

## 2023-09-30 DIAGNOSIS — I10: ICD-10-CM

## 2023-09-30 DIAGNOSIS — J44.1: ICD-10-CM

## 2023-09-30 DIAGNOSIS — F17.200: ICD-10-CM

## 2023-09-30 DIAGNOSIS — J18.9: ICD-10-CM

## 2023-09-30 DIAGNOSIS — Z99.81: ICD-10-CM

## 2023-09-30 DIAGNOSIS — J44.0: Primary | ICD-10-CM

## 2023-09-30 DIAGNOSIS — G35: ICD-10-CM

## 2023-09-30 DIAGNOSIS — J45.901: ICD-10-CM

## 2023-09-30 LAB
ALBUMIN SERPL-MCNC: 2.8 G/DL (ref 3.4–5)
ALP SERPL-CCNC: 53 U/L (ref 45–117)
ALT SERPL-CCNC: 28 U/L (ref 13–61)
ANION GAP SERPL CALC-SCNC: 3 MMOL/L (ref 8–16)
ANISOCYTOSIS BLD QL: (no result)
AST SERPL-CCNC: 12 U/L (ref 15–37)
BILIRUB SERPL-MCNC: 0.3 MG/DL (ref 0.2–1)
BUN SERPL-MCNC: 22.4 MG/DL (ref 7–18)
CALCIUM SERPL-MCNC: 8.3 MG/DL (ref 8.5–10.1)
CHLORIDE SERPL-SCNC: 109 MMOL/L (ref 98–107)
CO2 SERPL-SCNC: 32 MMOL/L (ref 21–32)
CREAT SERPL-MCNC: 0.7 MG/DL (ref 0.55–1.3)
DEPRECATED RDW RBC AUTO: 17.1 % (ref 11.6–15.6)
GLUCOSE SERPL-MCNC: 94 MG/DL (ref 74–106)
HCT VFR BLD CALC: 34.5 % (ref 32.4–45.2)
HGB BLD-MCNC: 10.8 GM/DL (ref 10.7–15.3)
MACROCYTES BLD QL: 0
MAGNESIUM SERPL-MCNC: 2.1 MG/DL (ref 1.8–2.4)
MCH RBC QN AUTO: 29.5 PG (ref 25.7–33.7)
MCHC RBC AUTO-ENTMCNC: 31.3 G/DL (ref 32–36)
MCV RBC: 94.3 FL (ref 80–96)
OVALOCYTES BLD QL SMEAR: (no result)
PLATELET # BLD AUTO: 157 10^3/UL (ref 134–434)
PMV BLD: 6.5 FL (ref 7.5–11.1)
POTASSIUM SERPLBLD-SCNC: 4.4 MMOL/L (ref 3.5–5.1)
PROT SERPL-MCNC: 5.2 G/DL (ref 6.4–8.2)
RBC # BLD AUTO: 3.66 M/MM3 (ref 3.6–5.2)
SODIUM SERPL-SCNC: 144 MMOL/L (ref 136–145)
WBC # BLD AUTO: 7.7 K/MM3 (ref 4–10)

## 2023-09-30 RX ADMIN — IPRATROPIUM BROMIDE AND ALBUTEROL SULFATE SCH AMP: .5; 3 SOLUTION RESPIRATORY (INHALATION) at 09:26

## 2023-09-30 RX ADMIN — IPRATROPIUM BROMIDE AND ALBUTEROL SULFATE SCH AMP: .5; 3 SOLUTION RESPIRATORY (INHALATION) at 20:10

## 2023-09-30 RX ADMIN — IPRATROPIUM BROMIDE AND ALBUTEROL SULFATE SCH AMP: .5; 3 SOLUTION RESPIRATORY (INHALATION) at 09:25

## 2023-09-30 RX ADMIN — ZOLPIDEM TARTRATE SCH MG: 5 TABLET, COATED ORAL at 21:13

## 2023-09-30 RX ADMIN — IPRATROPIUM BROMIDE AND ALBUTEROL SULFATE SCH AMP: .5; 3 SOLUTION RESPIRATORY (INHALATION) at 05:15

## 2023-09-30 RX ADMIN — IPRATROPIUM BROMIDE AND ALBUTEROL SULFATE SCH AMP: .5; 3 SOLUTION RESPIRATORY (INHALATION) at 11:36

## 2023-09-30 RX ADMIN — METHYLPREDNISOLONE SODIUM SUCCINATE SCH MG: 40 INJECTION, POWDER, FOR SOLUTION INTRAMUSCULAR; INTRAVENOUS at 14:45

## 2023-09-30 RX ADMIN — METHYLPREDNISOLONE SODIUM SUCCINATE SCH MG: 40 INJECTION, POWDER, FOR SOLUTION INTRAMUSCULAR; INTRAVENOUS at 21:13

## 2023-09-30 RX ADMIN — APIXABAN SCH MG: 2.5 TABLET, FILM COATED ORAL at 21:13

## 2023-09-30 RX ADMIN — IPRATROPIUM BROMIDE AND ALBUTEROL SULFATE SCH AMP: .5; 3 SOLUTION RESPIRATORY (INHALATION) at 05:35

## 2023-09-30 RX ADMIN — IPRATROPIUM BROMIDE AND ALBUTEROL SULFATE SCH AMP: .5; 3 SOLUTION RESPIRATORY (INHALATION) at 05:00

## 2023-09-30 RX ADMIN — Medication SCH ML: at 21:23

## 2023-09-30 RX ADMIN — Medication SCH ML: at 14:53

## 2023-09-30 RX ADMIN — IPRATROPIUM BROMIDE AND ALBUTEROL SULFATE SCH AMP: .5; 3 SOLUTION RESPIRATORY (INHALATION) at 15:17

## 2023-10-01 VITALS — RESPIRATION RATE: 18 BRPM

## 2023-10-01 LAB
ALBUMIN SERPL-MCNC: 3.3 G/DL (ref 3.4–5)
ALP SERPL-CCNC: 63 U/L (ref 45–117)
ALT SERPL-CCNC: 26 U/L (ref 13–61)
ANION GAP SERPL CALC-SCNC: 6 MMOL/L (ref 8–16)
ANISOCYTOSIS BLD QL: (no result)
AST SERPL-CCNC: 9 U/L (ref 15–37)
BILIRUB SERPL-MCNC: 0.4 MG/DL (ref 0.2–1)
BUN SERPL-MCNC: 21.1 MG/DL (ref 7–18)
CALCIUM SERPL-MCNC: 9.1 MG/DL (ref 8.5–10.1)
CHLORIDE SERPL-SCNC: 106 MMOL/L (ref 98–107)
CO2 SERPL-SCNC: 27 MMOL/L (ref 21–32)
CREAT SERPL-MCNC: 0.6 MG/DL (ref 0.55–1.3)
DEPRECATED RDW RBC AUTO: 17.2 % (ref 11.6–15.6)
GLUCOSE SERPL-MCNC: 101 MG/DL (ref 74–106)
HCT VFR BLD CALC: 35 % (ref 32.4–45.2)
HGB BLD-MCNC: 11.1 GM/DL (ref 10.7–15.3)
MACROCYTES BLD QL: 0
MCH RBC QN AUTO: 29.8 PG (ref 25.7–33.7)
MCHC RBC AUTO-ENTMCNC: 31.8 G/DL (ref 32–36)
MCV RBC: 93.8 FL (ref 80–96)
OVALOCYTES BLD QL SMEAR: (no result)
PLATELET # BLD AUTO: 202 10^3/UL (ref 134–434)
PMV BLD: 7.2 FL (ref 7.5–11.1)
POTASSIUM SERPLBLD-SCNC: 4.4 MMOL/L (ref 3.5–5.1)
PROT SERPL-MCNC: 6.1 G/DL (ref 6.4–8.2)
RBC # BLD AUTO: 3.73 M/MM3 (ref 3.6–5.2)
SODIUM SERPL-SCNC: 138 MMOL/L (ref 136–145)
WBC # BLD AUTO: 15.5 K/MM3 (ref 4–10)

## 2023-10-01 RX ADMIN — ASPIRIN 81 MG SCH MG: 81 TABLET ORAL at 09:21

## 2023-10-01 RX ADMIN — CEFTRIAXONE SCH MLS/HR: 1 INJECTION, POWDER, FOR SOLUTION INTRAMUSCULAR; INTRAVENOUS at 09:20

## 2023-10-01 RX ADMIN — BUDESONIDE SCH AMP: 0.5 SUSPENSION RESPIRATORY (INHALATION) at 10:00

## 2023-10-01 RX ADMIN — IPRATROPIUM BROMIDE AND ALBUTEROL SULFATE SCH AMP: .5; 3 SOLUTION RESPIRATORY (INHALATION) at 20:10

## 2023-10-01 RX ADMIN — Medication SCH ML: at 22:10

## 2023-10-01 RX ADMIN — ZOLPIDEM TARTRATE SCH MG: 5 TABLET, COATED ORAL at 22:10

## 2023-10-01 RX ADMIN — MIRTAZAPINE SCH MG: 15 TABLET, FILM COATED ORAL at 09:21

## 2023-10-01 RX ADMIN — Medication SCH ML: at 05:35

## 2023-10-01 RX ADMIN — PANTOPRAZOLE SODIUM SCH MG: 20 TABLET, DELAYED RELEASE ORAL at 09:21

## 2023-10-01 RX ADMIN — Medication SCH ML: at 14:59

## 2023-10-01 RX ADMIN — METHYLPREDNISOLONE SODIUM SUCCINATE SCH MG: 40 INJECTION, POWDER, FOR SOLUTION INTRAMUSCULAR; INTRAVENOUS at 09:21

## 2023-10-01 RX ADMIN — IPRATROPIUM BROMIDE AND ALBUTEROL SULFATE SCH AMP: .5; 3 SOLUTION RESPIRATORY (INHALATION) at 07:53

## 2023-10-01 RX ADMIN — IPRATROPIUM BROMIDE AND ALBUTEROL SULFATE SCH AMP: .5; 3 SOLUTION RESPIRATORY (INHALATION) at 15:21

## 2023-10-01 RX ADMIN — IPRATROPIUM BROMIDE AND ALBUTEROL SULFATE SCH AMP: .5; 3 SOLUTION RESPIRATORY (INHALATION) at 11:29

## 2023-10-01 RX ADMIN — NICOTINE SCH MG: 21 PATCH TRANSDERMAL at 09:20

## 2023-10-01 RX ADMIN — METHYLPREDNISOLONE SODIUM SUCCINATE SCH MG: 40 INJECTION, POWDER, FOR SOLUTION INTRAMUSCULAR; INTRAVENOUS at 18:55

## 2023-10-01 RX ADMIN — APIXABAN SCH MG: 2.5 TABLET, FILM COATED ORAL at 22:10

## 2023-10-01 RX ADMIN — AMLODIPINE BESYLATE SCH MG: 5 TABLET ORAL at 09:21

## 2023-10-01 RX ADMIN — APIXABAN SCH MG: 2.5 TABLET, FILM COATED ORAL at 09:21

## 2023-10-01 RX ADMIN — MONTELUKAST SODIUM SCH MG: 10 TABLET, COATED ORAL at 22:10

## 2023-10-01 RX ADMIN — METHYLPREDNISOLONE SODIUM SUCCINATE SCH MG: 40 INJECTION, POWDER, FOR SOLUTION INTRAMUSCULAR; INTRAVENOUS at 04:35

## 2023-10-01 RX ADMIN — BUDESONIDE SCH AMP: 0.5 SUSPENSION RESPIRATORY (INHALATION) at 21:00

## 2023-10-02 LAB
ALBUMIN SERPL-MCNC: 2.6 G/DL (ref 3.4–5)
ALP SERPL-CCNC: 48 U/L (ref 45–117)
ALT SERPL-CCNC: 23 U/L (ref 13–61)
ANION GAP SERPL CALC-SCNC: 5 MMOL/L (ref 8–16)
ANISOCYTOSIS BLD QL: 0
AST SERPL-CCNC: 5 U/L (ref 15–37)
BASO STIPL BLD QL SMEAR: 0
BILIRUB SERPL-MCNC: 0.2 MG/DL (ref 0.2–1)
BUN SERPL-MCNC: 25.7 MG/DL (ref 7–18)
CALCIUM SERPL-MCNC: 7.8 MG/DL (ref 8.5–10.1)
CHLORIDE SERPL-SCNC: 111 MMOL/L (ref 98–107)
CO2 SERPL-SCNC: 28 MMOL/L (ref 21–32)
CREAT SERPL-MCNC: 0.6 MG/DL (ref 0.55–1.3)
DACRYOCYTES BLD QL SMEAR: 0
DEPRECATED RDW RBC AUTO: 16.8 % (ref 11.6–15.6)
DOHLE BOD BLD QL SMEAR: 0
GLUCOSE SERPL-MCNC: 138 MG/DL (ref 74–106)
HCT VFR BLD CALC: 31.8 % (ref 32.4–45.2)
HELMET CELLS BLD QL SMEAR: 0
HGB BLD-MCNC: 10.3 GM/DL (ref 10.7–15.3)
HOWELL-JOLLY BOD BLD QL SMEAR: 0
MACROCYTES BLD QL: 0
MCH RBC QN AUTO: 29.8 PG (ref 25.7–33.7)
MCHC RBC AUTO-ENTMCNC: 32.2 G/DL (ref 32–36)
MCV RBC: 92.5 FL (ref 80–96)
OVALOCYTES BLD QL SMEAR: 0
PLATELET # BLD AUTO: 178 10^3/UL (ref 134–434)
PMV BLD: 7.3 FL (ref 7.5–11.1)
POTASSIUM SERPLBLD-SCNC: 4.2 MMOL/L (ref 3.5–5.1)
PROT SERPL-MCNC: 4.8 G/DL (ref 6.4–8.2)
RBC # BLD AUTO: 3.44 M/MM3 (ref 3.6–5.2)
ROULEAUX BLD QL SMEAR: 0
SICKLE CELLS BLD QL SMEAR: 0
SODIUM SERPL-SCNC: 143 MMOL/L (ref 136–145)
TARGETS BLD QL SMEAR: 0
TOXIC GRANULES BLD QL SMEAR: 0
WBC # BLD AUTO: 13.1 K/MM3 (ref 4–10)

## 2023-10-02 RX ADMIN — ZOLPIDEM TARTRATE SCH MG: 5 TABLET, COATED ORAL at 21:13

## 2023-10-02 RX ADMIN — Medication SCH ML: at 06:39

## 2023-10-02 RX ADMIN — METHYLPREDNISOLONE SODIUM SUCCINATE SCH MG: 40 INJECTION, POWDER, FOR SOLUTION INTRAMUSCULAR; INTRAVENOUS at 17:07

## 2023-10-02 RX ADMIN — IPRATROPIUM BROMIDE AND ALBUTEROL SULFATE SCH AMP: .5; 3 SOLUTION RESPIRATORY (INHALATION) at 12:05

## 2023-10-02 RX ADMIN — BUDESONIDE SCH AMP: 0.5 SUSPENSION RESPIRATORY (INHALATION) at 10:00

## 2023-10-02 RX ADMIN — IPRATROPIUM BROMIDE AND ALBUTEROL SULFATE SCH AMP: .5; 3 SOLUTION RESPIRATORY (INHALATION) at 16:25

## 2023-10-02 RX ADMIN — APIXABAN SCH MG: 2.5 TABLET, FILM COATED ORAL at 21:13

## 2023-10-02 RX ADMIN — METHYLPREDNISOLONE SODIUM SUCCINATE SCH MG: 40 INJECTION, POWDER, FOR SOLUTION INTRAMUSCULAR; INTRAVENOUS at 01:19

## 2023-10-02 RX ADMIN — MONTELUKAST SODIUM SCH MG: 10 TABLET, COATED ORAL at 21:13

## 2023-10-02 RX ADMIN — Medication SCH ML: at 13:05

## 2023-10-02 RX ADMIN — APIXABAN SCH MG: 2.5 TABLET, FILM COATED ORAL at 09:04

## 2023-10-02 RX ADMIN — AMLODIPINE BESYLATE SCH MG: 5 TABLET ORAL at 09:04

## 2023-10-02 RX ADMIN — BUDESONIDE SCH AMP: 0.5 SUSPENSION RESPIRATORY (INHALATION) at 21:16

## 2023-10-02 RX ADMIN — NICOTINE SCH MG: 21 PATCH TRANSDERMAL at 09:04

## 2023-10-02 RX ADMIN — IPRATROPIUM BROMIDE AND ALBUTEROL SULFATE SCH AMP: .5; 3 SOLUTION RESPIRATORY (INHALATION) at 10:06

## 2023-10-02 RX ADMIN — IPRATROPIUM BROMIDE AND ALBUTEROL SULFATE SCH AMP: .5; 3 SOLUTION RESPIRATORY (INHALATION) at 20:07

## 2023-10-02 RX ADMIN — METHYLPREDNISOLONE SODIUM SUCCINATE SCH MG: 40 INJECTION, POWDER, FOR SOLUTION INTRAMUSCULAR; INTRAVENOUS at 09:04

## 2023-10-02 RX ADMIN — CEFTRIAXONE SCH MLS/HR: 1 INJECTION, POWDER, FOR SOLUTION INTRAMUSCULAR; INTRAVENOUS at 10:09

## 2023-10-02 RX ADMIN — MIRTAZAPINE SCH MG: 15 TABLET, FILM COATED ORAL at 09:04

## 2023-10-02 RX ADMIN — ASPIRIN 81 MG SCH MG: 81 TABLET ORAL at 09:04

## 2023-10-02 RX ADMIN — PANTOPRAZOLE SODIUM SCH MG: 20 TABLET, DELAYED RELEASE ORAL at 09:04

## 2023-10-02 RX ADMIN — Medication SCH ML: at 21:14

## 2023-10-03 RX ADMIN — AMLODIPINE BESYLATE SCH MG: 5 TABLET ORAL at 10:10

## 2023-10-03 RX ADMIN — IPRATROPIUM BROMIDE AND ALBUTEROL SULFATE SCH AMP: .5; 3 SOLUTION RESPIRATORY (INHALATION) at 12:18

## 2023-10-03 RX ADMIN — Medication SCH ML: at 14:00

## 2023-10-03 RX ADMIN — MIRTAZAPINE SCH MG: 15 TABLET, FILM COATED ORAL at 10:10

## 2023-10-03 RX ADMIN — ASPIRIN 81 MG SCH MG: 81 TABLET ORAL at 10:10

## 2023-10-03 RX ADMIN — IPRATROPIUM BROMIDE AND ALBUTEROL SULFATE SCH AMP: .5; 3 SOLUTION RESPIRATORY (INHALATION) at 20:39

## 2023-10-03 RX ADMIN — METHYLPREDNISOLONE SODIUM SUCCINATE SCH MG: 40 INJECTION, POWDER, FOR SOLUTION INTRAMUSCULAR; INTRAVENOUS at 21:53

## 2023-10-03 RX ADMIN — ZOLPIDEM TARTRATE SCH MG: 5 TABLET, COATED ORAL at 21:50

## 2023-10-03 RX ADMIN — METHYLPREDNISOLONE SODIUM SUCCINATE SCH MG: 40 INJECTION, POWDER, FOR SOLUTION INTRAMUSCULAR; INTRAVENOUS at 01:23

## 2023-10-03 RX ADMIN — BUDESONIDE SCH AMP: 0.5 SUSPENSION RESPIRATORY (INHALATION) at 09:45

## 2023-10-03 RX ADMIN — CEFTRIAXONE SCH MLS/HR: 1 INJECTION, POWDER, FOR SOLUTION INTRAMUSCULAR; INTRAVENOUS at 10:10

## 2023-10-03 RX ADMIN — NICOTINE SCH MG: 21 PATCH TRANSDERMAL at 10:10

## 2023-10-03 RX ADMIN — METHYLPREDNISOLONE SODIUM SUCCINATE SCH MG: 40 INJECTION, POWDER, FOR SOLUTION INTRAMUSCULAR; INTRAVENOUS at 10:10

## 2023-10-03 RX ADMIN — IPRATROPIUM BROMIDE AND ALBUTEROL SULFATE SCH AMP: .5; 3 SOLUTION RESPIRATORY (INHALATION) at 15:43

## 2023-10-03 RX ADMIN — APIXABAN SCH MG: 2.5 TABLET, FILM COATED ORAL at 21:51

## 2023-10-03 RX ADMIN — Medication SCH ML: at 21:50

## 2023-10-03 RX ADMIN — MONTELUKAST SODIUM SCH MG: 10 TABLET, COATED ORAL at 21:50

## 2023-10-03 RX ADMIN — APIXABAN SCH MG: 2.5 TABLET, FILM COATED ORAL at 10:10

## 2023-10-03 RX ADMIN — BUDESONIDE SCH AMP: 0.5 SUSPENSION RESPIRATORY (INHALATION) at 21:20

## 2023-10-03 RX ADMIN — METHYLPREDNISOLONE SODIUM SUCCINATE SCH MG: 40 INJECTION, POWDER, FOR SOLUTION INTRAMUSCULAR; INTRAVENOUS at 14:00

## 2023-10-03 RX ADMIN — Medication SCH ML: at 06:13

## 2023-10-03 RX ADMIN — IPRATROPIUM BROMIDE AND ALBUTEROL SULFATE SCH AMP: .5; 3 SOLUTION RESPIRATORY (INHALATION) at 06:00

## 2023-10-03 RX ADMIN — PANTOPRAZOLE SODIUM SCH MG: 20 TABLET, DELAYED RELEASE ORAL at 10:10

## 2023-10-04 VITALS — SYSTOLIC BLOOD PRESSURE: 135 MMHG | TEMPERATURE: 98 F | DIASTOLIC BLOOD PRESSURE: 80 MMHG | HEART RATE: 101 BPM

## 2023-10-04 RX ADMIN — APIXABAN SCH MG: 2.5 TABLET, FILM COATED ORAL at 10:21

## 2023-10-04 RX ADMIN — MIRTAZAPINE SCH MG: 15 TABLET, FILM COATED ORAL at 10:21

## 2023-10-04 RX ADMIN — ASPIRIN 81 MG SCH MG: 81 TABLET ORAL at 11:25

## 2023-10-04 RX ADMIN — IPRATROPIUM BROMIDE AND ALBUTEROL SULFATE SCH AMP: .5; 3 SOLUTION RESPIRATORY (INHALATION) at 09:20

## 2023-10-04 RX ADMIN — IPRATROPIUM BROMIDE AND ALBUTEROL SULFATE SCH: .5; 3 SOLUTION RESPIRATORY (INHALATION) at 08:00

## 2023-10-04 RX ADMIN — IPRATROPIUM BROMIDE AND ALBUTEROL SULFATE SCH AMP: .5; 3 SOLUTION RESPIRATORY (INHALATION) at 12:43

## 2023-10-04 RX ADMIN — Medication SCH ML: at 15:01

## 2023-10-04 RX ADMIN — BUDESONIDE SCH AMP: 0.5 SUSPENSION RESPIRATORY (INHALATION) at 09:20

## 2023-10-04 RX ADMIN — IPRATROPIUM BROMIDE AND ALBUTEROL SULFATE SCH: .5; 3 SOLUTION RESPIRATORY (INHALATION) at 16:45

## 2023-10-04 RX ADMIN — NICOTINE SCH MG: 21 PATCH TRANSDERMAL at 10:21

## 2023-10-04 RX ADMIN — METHYLPREDNISOLONE SODIUM SUCCINATE SCH MG: 40 INJECTION, POWDER, FOR SOLUTION INTRAMUSCULAR; INTRAVENOUS at 10:20

## 2023-10-04 RX ADMIN — PANTOPRAZOLE SODIUM SCH MG: 20 TABLET, DELAYED RELEASE ORAL at 10:21

## 2023-10-04 RX ADMIN — CEFTRIAXONE SCH MLS/HR: 1 INJECTION, POWDER, FOR SOLUTION INTRAMUSCULAR; INTRAVENOUS at 10:21

## 2023-10-04 RX ADMIN — Medication SCH ML: at 06:53

## 2023-10-04 RX ADMIN — AMLODIPINE BESYLATE SCH MG: 5 TABLET ORAL at 10:21
